# Patient Record
Sex: FEMALE | Race: WHITE | NOT HISPANIC OR LATINO | Employment: OTHER | URBAN - METROPOLITAN AREA
[De-identification: names, ages, dates, MRNs, and addresses within clinical notes are randomized per-mention and may not be internally consistent; named-entity substitution may affect disease eponyms.]

---

## 2017-01-12 ENCOUNTER — HOSPITAL ENCOUNTER (OUTPATIENT)
Dept: ORTHOPEDIC SURGERY | Facility: CLINIC | Age: 62
Discharge: HOME | End: 2017-01-12
Attending: PHYSICIAN ASSISTANT | Admitting: PHYSICIAN ASSISTANT

## 2017-02-07 ENCOUNTER — HOSPITAL ENCOUNTER (OUTPATIENT)
Dept: ORTHOPEDIC SURGERY | Facility: CLINIC | Age: 62
Discharge: HOME | End: 2017-02-07
Attending: PHYSICIAN ASSISTANT | Admitting: PHYSICIAN ASSISTANT

## 2017-02-23 ENCOUNTER — HOSPITAL ENCOUNTER (OUTPATIENT)
Dept: ORTHOPEDIC SURGERY | Facility: CLINIC | Age: 62
Discharge: HOME | End: 2017-02-23
Attending: PHYSICIAN ASSISTANT | Admitting: PHYSICIAN ASSISTANT

## 2017-03-23 ENCOUNTER — HOSPITAL ENCOUNTER (OUTPATIENT)
Dept: ORTHOPEDIC SURGERY | Facility: CLINIC | Age: 62
Discharge: HOME | End: 2017-03-23
Attending: PHYSICIAN ASSISTANT | Admitting: PHYSICIAN ASSISTANT

## 2017-10-25 ENCOUNTER — OFFICE VISIT (OUTPATIENT)
Dept: FAMILY MEDICINE CLINIC | Facility: CLINIC | Age: 62
End: 2017-10-25

## 2017-10-25 VITALS
WEIGHT: 234.1 LBS | OXYGEN SATURATION: 98 % | BODY MASS INDEX: 39 KG/M2 | HEART RATE: 80 BPM | TEMPERATURE: 98.7 F | SYSTOLIC BLOOD PRESSURE: 118 MMHG | DIASTOLIC BLOOD PRESSURE: 75 MMHG | HEIGHT: 65 IN

## 2017-10-25 DIAGNOSIS — R53.83 FATIGUE, UNSPECIFIED TYPE: ICD-10-CM

## 2017-10-25 DIAGNOSIS — G89.29 CHRONIC MIDLINE LOW BACK PAIN WITHOUT SCIATICA: ICD-10-CM

## 2017-10-25 DIAGNOSIS — M54.50 CHRONIC MIDLINE LOW BACK PAIN WITHOUT SCIATICA: ICD-10-CM

## 2017-10-25 DIAGNOSIS — Z13.220 SCREENING FOR HYPERLIPIDEMIA: ICD-10-CM

## 2017-10-25 DIAGNOSIS — M54.2 NECK PAIN: ICD-10-CM

## 2017-10-25 DIAGNOSIS — F32.A DEPRESSION, UNSPECIFIED DEPRESSION TYPE: ICD-10-CM

## 2017-10-25 DIAGNOSIS — Z13.1 SCREENING FOR DIABETES MELLITUS: Primary | ICD-10-CM

## 2017-10-25 DIAGNOSIS — E66.9 CLASS 2 OBESITY WITH BODY MASS INDEX (BMI) OF 39.0 TO 39.9 IN ADULT, UNSPECIFIED OBESITY TYPE, UNSPECIFIED WHETHER SERIOUS COMORBIDITY PRESENT: ICD-10-CM

## 2017-10-25 PROCEDURE — 99214 OFFICE O/P EST MOD 30 MIN: CPT | Performed by: FAMILY MEDICINE

## 2017-10-25 RX ORDER — ESCITALOPRAM OXALATE 10 MG/1
10 TABLET ORAL DAILY
Qty: 30 TABLET | Refills: 2 | Status: SHIPPED | OUTPATIENT
Start: 2017-10-25 | End: 2017-11-29 | Stop reason: ALTCHOICE

## 2017-10-25 RX ORDER — NAPROXEN 500 MG/1
500 TABLET ORAL 2 TIMES DAILY WITH MEALS
Qty: 60 TABLET | Refills: 1 | Status: SHIPPED | OUTPATIENT
Start: 2017-10-25 | End: 2017-11-29 | Stop reason: ALTCHOICE
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
## 2017-10-27 LAB
ALBUMIN SERPL-MCNC: NORMAL G/DL
ALP SERPL-CCNC: NORMAL U/L
ALT SERPL-CCNC: NORMAL U/L
AST SERPL-CCNC: NORMAL U/L
BASOPHILS # BLD AUTO: 0.04 10*3/MM3 (ref 0–0.2)
BASOPHILS NFR BLD AUTO: 0.6 % (ref 0–1.5)
BILIRUB SERPL-MCNC: NORMAL MG/DL
BUN SERPL-MCNC: NORMAL MG/DL
CALCIUM SERPL-MCNC: NORMAL MG/DL
CHLORIDE SERPL-SCNC: NORMAL MMOL/L
CHOLEST SERPL-MCNC: 201 MG/DL (ref 0–200)
CO2 SERPL-SCNC: NORMAL MMOL/L
CREAT SERPL-MCNC: NORMAL MG/DL
EOSINOPHIL # BLD AUTO: 0.14 10*3/MM3 (ref 0–0.7)
EOSINOPHIL NFR BLD AUTO: 2.2 % (ref 0.3–6.2)
ERYTHROCYTE [DISTWIDTH] IN BLOOD BY AUTOMATED COUNT: 14.1 % (ref 11.7–13)
GLUCOSE SERPL-MCNC: NORMAL MG/DL
HCT VFR BLD AUTO: 49.2 % (ref 35.6–45.5)
HCV AB S/CO SERPL IA: <0.1 S/CO RATIO (ref 0–0.9)
HDLC SERPL-MCNC: 49 MG/DL (ref 40–60)
HGB BLD-MCNC: 15.2 G/DL (ref 11.9–15.5)
IMM GRANULOCYTES # BLD: 0 10*3/MM3 (ref 0–0.03)
IMM GRANULOCYTES NFR BLD: 0 % (ref 0–0.5)
LDLC SERPL CALC-MCNC: 120 MG/DL (ref 0–100)
LYMPHOCYTES # BLD AUTO: 1.34 10*3/MM3 (ref 0.9–4.8)
LYMPHOCYTES NFR BLD AUTO: 21.3 % (ref 19.6–45.3)
MCH RBC QN AUTO: 30.2 PG (ref 26.9–32)
MCHC RBC AUTO-ENTMCNC: 30.9 G/DL (ref 32.4–36.3)
MCV RBC AUTO: 97.6 FL (ref 80.5–98.2)
MONOCYTES # BLD AUTO: 0.56 10*3/MM3 (ref 0.2–1.2)
MONOCYTES NFR BLD AUTO: 8.9 % (ref 5–12)
NEUTROPHILS # BLD AUTO: 4.22 10*3/MM3 (ref 1.9–8.1)
NEUTROPHILS NFR BLD AUTO: 67 % (ref 42.7–76)
PLATELET # BLD AUTO: 196 10*3/MM3 (ref 140–500)
POTASSIUM SERPL-SCNC: NORMAL MMOL/L
PROT SERPL-MCNC: NORMAL G/DL
RBC # BLD AUTO: 5.04 10*6/MM3 (ref 3.9–5.2)
REQUEST PROBLEM: NORMAL
SODIUM SERPL-SCNC: NORMAL MMOL/L
TRIGL SERPL-MCNC: 158 MG/DL (ref 0–150)
TSH SERPL DL<=0.005 MIU/L-ACNC: 2.2 MIU/ML (ref 0.27–4.2)
VLDLC SERPL CALC-MCNC: 31.6 MG/DL (ref 5–40)
WBC # BLD AUTO: 6.3 10*3/MM3 (ref 4.5–10.7)

## 2017-10-28 LAB
ALBUMIN SERPL-MCNC: 4.6 G/DL (ref 3.6–4.8)
ALBUMIN/GLOB SERPL: 1.7 {RATIO} (ref 1.2–2.2)
ALP SERPL-CCNC: 91 IU/L (ref 39–117)
ALT SERPL-CCNC: 18 IU/L (ref 0–32)
AST SERPL-CCNC: 21 IU/L (ref 0–40)
BILIRUB SERPL-MCNC: 0.5 MG/DL (ref 0–1.2)
BUN SERPL-MCNC: 17 MG/DL (ref 8–27)
BUN/CREAT SERPL: 22 (ref 12–28)
CALCIUM SERPL-MCNC: 9.3 MG/DL (ref 8.7–10.3)
CHLORIDE SERPL-SCNC: 100 MMOL/L (ref 96–106)
CO2 SERPL-SCNC: 24 MMOL/L (ref 18–29)
CREAT SERPL-MCNC: 0.78 MG/DL (ref 0.57–1)
GFR SERPLBLD CREATININE-BSD FMLA CKD-EPI: 82 ML/MIN/1.73
GFR SERPLBLD CREATININE-BSD FMLA CKD-EPI: 95 ML/MIN/1.73
GLOBULIN SER CALC-MCNC: 2.7 G/DL (ref 1.5–4.5)
GLUCOSE SERPL-MCNC: 62 MG/DL (ref 65–99)
POTASSIUM SERPL-SCNC: 3.9 MMOL/L (ref 3.5–5.2)
PROT SERPL-MCNC: 7.3 G/DL (ref 6–8.5)
SODIUM SERPL-SCNC: 142 MMOL/L (ref 134–144)

## 2017-10-30 ENCOUNTER — TELEPHONE (OUTPATIENT)
Dept: FAMILY MEDICINE CLINIC | Facility: CLINIC | Age: 62
End: 2017-10-30

## 2017-10-30 NOTE — TELEPHONE ENCOUNTER
Spoke with patient regarding her question about Lexapro causing insomnia and tenseness.  She says this has improved over the weekend and she has been sleeping better and the tenseness she is experiencing in the jaw and neck has also been improving at she still notices it somewhat.  She notices more of a difference in her ability to relax her body not necessarily any mood changes.  She is happy with these changes.    We discussed her lab results including normal TSH, negative hepatitis C antibody, normal CMP outside of only slightly low blood sugar level, and her lipid panel with mild elevation in total cholesterol, LDL, and triglycerides.  We discussed that these numbers don't indicate starting medication at this time giving her mild elevation, and as we are on the journey to having her hopefully feel better and improve her level of function she will become more physically active and these numbers will improve.  For the only mild degree of hypoglycemia, we will repeat her blood glucose when she returns back to the office at the end of November.    She starts physical therapy on November 8.  All of her questions were answered and it was discussed that if anything changes she can contact the office or send me another message through her chart.

## 2017-11-08 ENCOUNTER — TREATMENT (OUTPATIENT)
Dept: PHYSICAL THERAPY | Facility: CLINIC | Age: 62
End: 2017-11-08

## 2017-11-08 DIAGNOSIS — M25.571 CHRONIC PAIN OF RIGHT ANKLE: ICD-10-CM

## 2017-11-08 DIAGNOSIS — G89.29 CHRONIC BILATERAL LOW BACK PAIN WITH BILATERAL SCIATICA: Primary | ICD-10-CM

## 2017-11-08 DIAGNOSIS — M54.41 CHRONIC BILATERAL LOW BACK PAIN WITH BILATERAL SCIATICA: Primary | ICD-10-CM

## 2017-11-08 DIAGNOSIS — M54.12 RADICULOPATHY, CERVICAL: ICD-10-CM

## 2017-11-08 DIAGNOSIS — M54.42 CHRONIC BILATERAL LOW BACK PAIN WITH BILATERAL SCIATICA: Primary | ICD-10-CM

## 2017-11-08 DIAGNOSIS — M54.2 PAIN, NECK: ICD-10-CM

## 2017-11-08 DIAGNOSIS — G89.29 CHRONIC PAIN OF RIGHT ANKLE: ICD-10-CM

## 2017-11-08 PROCEDURE — 97163 PT EVAL HIGH COMPLEX 45 MIN: CPT | Performed by: PHYSICAL THERAPIST

## 2017-11-08 PROCEDURE — 97110 THERAPEUTIC EXERCISES: CPT | Performed by: PHYSICAL THERAPIST
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
## 2017-11-15 ENCOUNTER — TREATMENT (OUTPATIENT)
Dept: PHYSICAL THERAPY | Facility: CLINIC | Age: 62
End: 2017-11-15

## 2017-11-15 DIAGNOSIS — M54.2 PAIN, NECK: ICD-10-CM

## 2017-11-15 DIAGNOSIS — M54.41 CHRONIC BILATERAL LOW BACK PAIN WITH BILATERAL SCIATICA: Primary | ICD-10-CM

## 2017-11-15 DIAGNOSIS — M54.12 RADICULOPATHY, CERVICAL: ICD-10-CM

## 2017-11-15 DIAGNOSIS — G89.29 CHRONIC BILATERAL LOW BACK PAIN WITH BILATERAL SCIATICA: Primary | ICD-10-CM

## 2017-11-15 DIAGNOSIS — G89.29 CHRONIC PAIN OF RIGHT ANKLE: ICD-10-CM

## 2017-11-15 DIAGNOSIS — M25.571 CHRONIC PAIN OF RIGHT ANKLE: ICD-10-CM

## 2017-11-15 DIAGNOSIS — M54.42 CHRONIC BILATERAL LOW BACK PAIN WITH BILATERAL SCIATICA: Primary | ICD-10-CM

## 2017-11-15 PROCEDURE — 97110 THERAPEUTIC EXERCISES: CPT | Performed by: PHYSICAL THERAPIST

## 2017-11-15 PROCEDURE — 97140 MANUAL THERAPY 1/> REGIONS: CPT | Performed by: PHYSICAL THERAPIST

## 2017-11-15 NOTE — PROGRESS NOTES
Physical Therapy Daily Progress Note        Patient: Jannet Fierro   : 1955  Diagnosis/ICD-10 Code:  Chronic bilateral low back pain with bilateral sciatica [M54.42, M54.41, G89.29]  Referring practitioner: Sandra Roe MD  Date of Initial Visit: Type: THERAPY  Noted: 2017  Today's Date: 11/15/2017  Patient seen for 2 sessions         Jannet Fierro reports:       Subjective   Patient reports she has had increased pain/soreness with the new exercises and feels her pain is a little worse than typical because of the weather.    Objective   See Exercise, Manual, and Modality Logs for complete treatment.       Assessment/Plan  Patient tolerated exercise progression well with some tenderness but nothing more than her usual pain symptoms with activity.  Noted increased right ankle ROM since initial eval.  Patient declined modalities for pain management.  Progress per Plan of Care and Progress strengthening /stabilization /functional activity           Manual Therapy:    10     mins  06177;  Therapeutic Exercise:    35     mins  54839;     Neuromuscular Candice:        mins  57756;    Therapeutic Activity:          mins  40363;     Gait Training:           mins  52394;     Ultrasound:          mins  33388;    Electrical Stimulation:         mins  31561 ( );  Dry Needling          mins self-pay    Timed Treatment:   45   mins   Total Treatment:     50   mins    Malou Mederos PT  Physical Therapist

## 2017-11-17 ENCOUNTER — TREATMENT (OUTPATIENT)
Dept: PHYSICAL THERAPY | Facility: CLINIC | Age: 62
End: 2017-11-17

## 2017-11-17 DIAGNOSIS — M54.42 CHRONIC BILATERAL LOW BACK PAIN WITH BILATERAL SCIATICA: Primary | ICD-10-CM

## 2017-11-17 DIAGNOSIS — M54.2 PAIN, NECK: ICD-10-CM

## 2017-11-17 DIAGNOSIS — G89.29 CHRONIC PAIN OF RIGHT ANKLE: ICD-10-CM

## 2017-11-17 DIAGNOSIS — G89.29 CHRONIC BILATERAL LOW BACK PAIN WITH BILATERAL SCIATICA: Primary | ICD-10-CM

## 2017-11-17 DIAGNOSIS — M54.41 CHRONIC BILATERAL LOW BACK PAIN WITH BILATERAL SCIATICA: Primary | ICD-10-CM

## 2017-11-17 DIAGNOSIS — M54.12 RADICULOPATHY, CERVICAL: ICD-10-CM

## 2017-11-17 DIAGNOSIS — M25.571 CHRONIC PAIN OF RIGHT ANKLE: ICD-10-CM

## 2017-11-17 PROCEDURE — 97110 THERAPEUTIC EXERCISES: CPT | Performed by: PHYSICAL THERAPIST

## 2017-11-17 PROCEDURE — 97140 MANUAL THERAPY 1/> REGIONS: CPT | Performed by: PHYSICAL THERAPIST

## 2017-11-17 NOTE — PROGRESS NOTES
Physical Therapy Daily Progress Note        Patient: Jannet Fierro   : 1955  Diagnosis/ICD-10 Code:  Chronic bilateral low back pain with bilateral sciatica [M54.42, M54.41, G89.29]  Referring practitioner: Sandra Roe MD  Date of Initial Visit: Type: THERAPY  Noted: 2017  Today's Date: 2017  Patient seen for 3 sessions         Jannet Fierro reports:       Subjective   Patient reports her neck is very stiff today, her ankle is very painful and she can feel the hardware.    Objective   Right AINN-corrected with MET    See Exercise, Manual, and Modality Logs for complete treatment.       Assessment/Plan  Patient tolerated treatment well without complaints.  Improved SIJ alignment following MET.  Improved pain symptoms following treatment.  Progress per Plan of Care and Progress strengthening /stabilization /functional activity           Manual Therapy:    15     mins  90821;  Therapeutic Exercise:    30     mins  30654;     Neuromuscular Candice:        mins  12448;    Therapeutic Activity:          mins  28617;     Gait Training:           mins  98616;     Ultrasound:          mins  72901;    Electrical Stimulation:         mins  14805 ( );  Dry Needling          mins self-pay    Timed Treatment:   45   mins   Total Treatment:     50   mins    Malou Mederos PT  Physical Therapist

## 2017-11-21 ENCOUNTER — TREATMENT (OUTPATIENT)
Dept: PHYSICAL THERAPY | Facility: CLINIC | Age: 62
End: 2017-11-21

## 2017-11-21 DIAGNOSIS — M54.2 PAIN, NECK: ICD-10-CM

## 2017-11-21 DIAGNOSIS — G89.29 CHRONIC PAIN OF RIGHT ANKLE: ICD-10-CM

## 2017-11-21 DIAGNOSIS — G89.29 CHRONIC BILATERAL LOW BACK PAIN WITH BILATERAL SCIATICA: Primary | ICD-10-CM

## 2017-11-21 DIAGNOSIS — M54.42 CHRONIC BILATERAL LOW BACK PAIN WITH BILATERAL SCIATICA: Primary | ICD-10-CM

## 2017-11-21 DIAGNOSIS — M54.12 RADICULOPATHY, CERVICAL: ICD-10-CM

## 2017-11-21 DIAGNOSIS — M25.571 CHRONIC PAIN OF RIGHT ANKLE: ICD-10-CM

## 2017-11-21 DIAGNOSIS — M54.41 CHRONIC BILATERAL LOW BACK PAIN WITH BILATERAL SCIATICA: Primary | ICD-10-CM

## 2017-11-21 PROCEDURE — 97140 MANUAL THERAPY 1/> REGIONS: CPT | Performed by: PHYSICAL THERAPIST

## 2017-11-21 PROCEDURE — 97110 THERAPEUTIC EXERCISES: CPT | Performed by: PHYSICAL THERAPIST

## 2017-11-21 NOTE — PROGRESS NOTES
Physical Therapy Daily Progress Note        Patient: Jannet Fierro   : 1955  Diagnosis/ICD-10 Code:  Chronic bilateral low back pain with bilateral sciatica [M54.42, M54.41, G89.29]  Referring practitioner: Sandra Roe MD  Date of Initial Visit: Type: THERAPY  Noted: 2017  Today's Date: 2017  Patient seen for 4 sessions         Jannet Fierro reports:     Subjective   Patient reports her pain has decreased some, her right ankle is a little more swollen than usual.  Reports she was able to walk longer than usual when walking her dog and with less pain.        Objective   See Exercise, Manual, and Modality Logs for complete treatment.       Assessment/Plan  Improving right ankle ROM and strength, continues with limited lumbar and cervical mobility.    Progress per Plan of Care and Progress strengthening /stabilization /functional activity           Manual Therapy:    15     mins  96067;  Therapeutic Exercise:    30     mins  41917;     Neuromuscular Candice:        mins  52842;    Therapeutic Activity:          mins  90481;     Gait Training:           mins  38692;     Ultrasound:          mins  03923;    Electrical Stimulation:         mins  51254 ( );  Dry Needling          mins self-pay    Timed Treatment:   45   mins   Total Treatment:     50   mins    Malou Mederos, PT  Physical Therapist

## 2017-11-27 ENCOUNTER — TREATMENT (OUTPATIENT)
Dept: PHYSICAL THERAPY | Facility: CLINIC | Age: 62
End: 2017-11-27

## 2017-11-27 DIAGNOSIS — M25.571 CHRONIC PAIN OF RIGHT ANKLE: ICD-10-CM

## 2017-11-27 DIAGNOSIS — M54.41 CHRONIC BILATERAL LOW BACK PAIN WITH BILATERAL SCIATICA: Primary | ICD-10-CM

## 2017-11-27 DIAGNOSIS — G89.29 CHRONIC PAIN OF RIGHT ANKLE: ICD-10-CM

## 2017-11-27 DIAGNOSIS — M54.42 CHRONIC BILATERAL LOW BACK PAIN WITH BILATERAL SCIATICA: Primary | ICD-10-CM

## 2017-11-27 DIAGNOSIS — G89.29 CHRONIC BILATERAL LOW BACK PAIN WITH BILATERAL SCIATICA: Primary | ICD-10-CM

## 2017-11-27 DIAGNOSIS — M54.12 RADICULOPATHY, CERVICAL: ICD-10-CM

## 2017-11-27 DIAGNOSIS — M54.2 PAIN, NECK: ICD-10-CM

## 2017-11-27 PROCEDURE — 97110 THERAPEUTIC EXERCISES: CPT | Performed by: PHYSICAL THERAPIST

## 2017-11-27 PROCEDURE — 97140 MANUAL THERAPY 1/> REGIONS: CPT | Performed by: PHYSICAL THERAPIST

## 2017-11-27 NOTE — PROGRESS NOTES
Physical Therapy Daily Progress Note        Patient: Jannet Fierro   : 1955  Diagnosis/ICD-10 Code:  Chronic bilateral low back pain with bilateral sciatica [M54.42, M54.41, G89.29]  Referring practitioner: Sandra Roe MD  Date of Initial Visit: Type: THERAPY  Noted: 2017  Today's Date: 2017  Patient seen for 5 sessions         Jannet Fierro reports:       Subjective   Patient reports she has been having overall less intense pain although pain symptoms. Reports cooling and cleaning on Thursday did increase her pain somewhat.    Objective   See Exercise, Manual, and Modality Logs for complete treatment.       Assessment/Plan  Patient perform program and tolerated progression without complaints.  Progress per Plan of Care           Manual Therapy:    15     mins  48269;  Therapeutic Exercise:    30     mins  32386;     Neuromuscular Candice:        mins  61685;    Therapeutic Activity:          mins  65103;     Gait Training:           mins  64983;     Ultrasound:          mins  87494;    Electrical Stimulation:         mins  25656 ( );  Dry Needling          mins self-pay    Timed Treatment:   45   mins   Total Treatment:     50   mins    Malou Mederos PT  Physical Therapist

## 2017-11-29 ENCOUNTER — TREATMENT (OUTPATIENT)
Dept: PHYSICAL THERAPY | Facility: CLINIC | Age: 62
End: 2017-11-29

## 2017-11-29 ENCOUNTER — OFFICE VISIT (OUTPATIENT)
Dept: FAMILY MEDICINE CLINIC | Facility: CLINIC | Age: 62
End: 2017-11-29

## 2017-11-29 VITALS
HEART RATE: 74 BPM | DIASTOLIC BLOOD PRESSURE: 80 MMHG | BODY MASS INDEX: 40.39 KG/M2 | OXYGEN SATURATION: 96 % | TEMPERATURE: 98.1 F | HEIGHT: 65 IN | SYSTOLIC BLOOD PRESSURE: 130 MMHG | WEIGHT: 242.4 LBS

## 2017-11-29 DIAGNOSIS — M54.42 CHRONIC BILATERAL LOW BACK PAIN WITH BILATERAL SCIATICA: Primary | ICD-10-CM

## 2017-11-29 DIAGNOSIS — G89.29 CHRONIC PAIN OF RIGHT ANKLE: ICD-10-CM

## 2017-11-29 DIAGNOSIS — M54.41 CHRONIC BILATERAL LOW BACK PAIN WITH BILATERAL SCIATICA: Primary | ICD-10-CM

## 2017-11-29 DIAGNOSIS — G89.29 CHRONIC MIDLINE LOW BACK PAIN WITHOUT SCIATICA: Primary | ICD-10-CM

## 2017-11-29 DIAGNOSIS — M54.2 PAIN, NECK: ICD-10-CM

## 2017-11-29 DIAGNOSIS — F32.0 MILD SINGLE CURRENT EPISODE OF MAJOR DEPRESSIVE DISORDER (HCC): ICD-10-CM

## 2017-11-29 DIAGNOSIS — G89.29 CHRONIC BILATERAL LOW BACK PAIN WITH BILATERAL SCIATICA: Primary | ICD-10-CM

## 2017-11-29 DIAGNOSIS — M54.50 CHRONIC MIDLINE LOW BACK PAIN WITHOUT SCIATICA: Primary | ICD-10-CM

## 2017-11-29 DIAGNOSIS — M25.571 CHRONIC PAIN OF RIGHT ANKLE: ICD-10-CM

## 2017-11-29 DIAGNOSIS — M54.12 RADICULOPATHY, CERVICAL: ICD-10-CM

## 2017-11-29 DIAGNOSIS — R63.5 WEIGHT GAIN: ICD-10-CM

## 2017-11-29 PROCEDURE — 97140 MANUAL THERAPY 1/> REGIONS: CPT | Performed by: PHYSICAL THERAPIST

## 2017-11-29 PROCEDURE — 99213 OFFICE O/P EST LOW 20 MIN: CPT | Performed by: FAMILY MEDICINE

## 2017-11-29 PROCEDURE — 97110 THERAPEUTIC EXERCISES: CPT | Performed by: PHYSICAL THERAPIST

## 2017-11-29 RX ORDER — CITALOPRAM 20 MG/1
20 TABLET ORAL DAILY
Qty: 30 TABLET | Refills: 2 | Status: SHIPPED | OUTPATIENT
Start: 2017-11-29 | End: 2018-01-30 | Stop reason: SDUPTHER

## 2017-11-29 NOTE — PROGRESS NOTES
Physical Therapy Daily Progress Note        Patient: Jannet Fierro   : 1955  Diagnosis/ICD-10 Code:  Chronic bilateral low back pain with bilateral sciatica [M54.42, M54.41, G89.29]  Referring practitioner: Sandra Roe MD  Date of Initial Visit: Type: THERAPY  Noted: 2017  Today's Date: 2017  Patient seen for 6 sessions         Jannet Fierro reports:     Subjective   Patient reports she used the stairs at work today and has had increased achilles pain in bilateral ankles since.  Other than the achilles pain she has been feeling better.      Objective   See Exercise, Manual, and Modality Logs for complete treatment.       Assessment/Plan  Patient tolerated treatment well without increased symptoms.  Patient in agreement to continue PT to advance exercise program.  Progress per Plan of Care and Progress strengthening /stabilization /functional activity           Manual Therapy:    10     mins  14248;  Therapeutic Exercise:    30     mins  69195;     Neuromuscular Candice:        mins  11392;    Therapeutic Activity:          mins  67545;     Gait Training:           mins  01499;     Ultrasound:          mins  87231;    Electrical Stimulation:         mins  35281 ( );  Dry Needling          mins self-pay    Timed Treatment:   40   mins   Total Treatment:     45   mins    Malou Mederos PT  Physical Therapist
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
## 2017-12-05 ENCOUNTER — TREATMENT (OUTPATIENT)
Dept: PHYSICAL THERAPY | Facility: CLINIC | Age: 62
End: 2017-12-05

## 2017-12-05 DIAGNOSIS — M25.571 CHRONIC PAIN OF RIGHT ANKLE: ICD-10-CM

## 2017-12-05 DIAGNOSIS — M54.41 CHRONIC BILATERAL LOW BACK PAIN WITH BILATERAL SCIATICA: Primary | ICD-10-CM

## 2017-12-05 DIAGNOSIS — M54.42 CHRONIC BILATERAL LOW BACK PAIN WITH BILATERAL SCIATICA: Primary | ICD-10-CM

## 2017-12-05 DIAGNOSIS — M54.2 PAIN, NECK: ICD-10-CM

## 2017-12-05 DIAGNOSIS — G89.29 CHRONIC PAIN OF RIGHT ANKLE: ICD-10-CM

## 2017-12-05 DIAGNOSIS — G89.29 CHRONIC BILATERAL LOW BACK PAIN WITH BILATERAL SCIATICA: Primary | ICD-10-CM

## 2017-12-05 DIAGNOSIS — M54.12 RADICULOPATHY, CERVICAL: ICD-10-CM

## 2017-12-05 PROCEDURE — 97110 THERAPEUTIC EXERCISES: CPT | Performed by: PHYSICAL THERAPIST

## 2017-12-05 PROCEDURE — 97140 MANUAL THERAPY 1/> REGIONS: CPT | Performed by: PHYSICAL THERAPIST

## 2017-12-05 NOTE — PROGRESS NOTES
Physical Therapy Daily Progress Note        Patient: Jannet Fierro   : 1955  Diagnosis/ICD-10 Code:  Chronic bilateral low back pain with bilateral sciatica [M54.42, M54.41, G89.29]  Referring practitioner: Sandra Roe MD  Date of Initial Visit: Type: THERAPY  Noted: 2017  Today's Date: 2017  Patient seen for 7 sessions         Jannet Fierro reports:     Subjective   Patient reports her ankle is feeling better with more mobility although pain and calf muscle tightness continues.  Reports the new stretches for the shoulder/neck jazzed her pain a little but feels she needs to keep doing them.  States the change in weather has her pain level elevated a little today.    Objective   See Exercise, Manual, and Modality Logs for complete treatment.       Assessment/Plan  Patient tolerated treatment and exercise progression well without adverse symptoms.  Improved right ankle ROM and TCJ mobility.    Progress per Plan of Care and Progress strengthening /stabilization /functional activity           Manual Therapy:    10     mins  81180;  Therapeutic Exercise:    30     mins  59169;     Neuromuscular Candice:        mins  94061;    Therapeutic Activity:          mins  69390;     Gait Training:           mins  87779;     Ultrasound:          mins  84705;    Electrical Stimulation:         mins  37139 ( );  Dry Needling          mins self-pay    Timed Treatment:   40   mins   Total Treatment:     45   mins    Malou Mederos PT  Physical Therapist

## 2017-12-07 DIAGNOSIS — M62.838 MUSCLE SPASM: Primary | ICD-10-CM

## 2017-12-07 RX ORDER — METHOCARBAMOL 500 MG/1
750 TABLET, FILM COATED ORAL 4 TIMES DAILY
Qty: 135 TABLET | Refills: 0 | Status: SHIPPED | OUTPATIENT
Start: 2017-12-07 | End: 2018-01-30 | Stop reason: SDUPTHER

## 2017-12-12 ENCOUNTER — TREATMENT (OUTPATIENT)
Dept: PHYSICAL THERAPY | Facility: CLINIC | Age: 62
End: 2017-12-12

## 2017-12-12 DIAGNOSIS — M54.12 RADICULOPATHY, CERVICAL: ICD-10-CM

## 2017-12-12 DIAGNOSIS — M54.41 CHRONIC BILATERAL LOW BACK PAIN WITH BILATERAL SCIATICA: Primary | ICD-10-CM

## 2017-12-12 DIAGNOSIS — M25.571 CHRONIC PAIN OF RIGHT ANKLE: ICD-10-CM

## 2017-12-12 DIAGNOSIS — G89.29 CHRONIC BILATERAL LOW BACK PAIN WITH BILATERAL SCIATICA: Primary | ICD-10-CM

## 2017-12-12 DIAGNOSIS — G89.29 CHRONIC PAIN OF RIGHT ANKLE: ICD-10-CM

## 2017-12-12 DIAGNOSIS — M54.2 PAIN, NECK: ICD-10-CM

## 2017-12-12 DIAGNOSIS — M54.42 CHRONIC BILATERAL LOW BACK PAIN WITH BILATERAL SCIATICA: Primary | ICD-10-CM

## 2017-12-12 PROCEDURE — 97140 MANUAL THERAPY 1/> REGIONS: CPT | Performed by: PHYSICAL THERAPIST

## 2017-12-12 PROCEDURE — 97110 THERAPEUTIC EXERCISES: CPT | Performed by: PHYSICAL THERAPIST

## 2017-12-12 NOTE — PROGRESS NOTES
Re-Assessment / Re-Certification      Patient: Jannet Fierro   : 1955  Diagnosis/ICD-10 Code:  Chronic bilateral low back pain with bilateral sciatica [M54.42, M54.41, G89.29]  Referring practitioner: Sandra Roe MD  Date of Initial Visit: Type: THERAPY  Noted: 2017  Today's Date: 2017  Patient seen for 8 sessions      Subjective:   Jannet Fierro reports:   Subjective Questionnaire: NDI:20  Oswestry: 25/50  Clinical Progress: improved  Home Program Compliance: Yes  Treatment has included: therapeutic exercise, manual therapy and moist heat    Subjective   Objective       Active Range of Motion     Right Ankle/Foot   Dorsiflexion (ke): 4 degrees   Plantar flexion: 60 degrees   Inversion: 28 degrees   Eversion: 6 degrees      Assessment/Plan   Pt would benefit from continued skilled PT services in order to address listed impairments and increase tolerance to normal daily activities including ADLs, work and recreational activities  Progress toward previous goals: Partially Met    Plan Goals: Short Term Goals (2 wks):  1.  Patient will have increased cervical and lumbar spine ROM to be symmetrical and WFL's to allow for increased functional joint mobility.  2.  Patient will have increased cervical spine strength to 5/5 to allow for increased spinal stabilization and support.  3.  Patient will have decreased pain to 3/10 to allow for increased comfort with functional activities and allow for improved restorative sleep.  4.  Patient will have increased right ankle ROM to WNL's to allow for improved joint mechanics and decreased abnormal gait mechanics.  5.  Patient will have increased right ankle joint mobility to WNL's to restore normal joint mechanics and decrease abnormal gait mechanics and CKC dysfunction.    Long Term Goals (3-4 wks):  1.  Patient will have increased right ankle strength to 5/5.  2.  Patient will be independent in performance of HEP for carryover upon discharge from skilled PT  services.  3.  Patient will have an improved NDI score of 8 or less to allow improved functional activity tolerance.  4.  Patient will report return to performance of ADLs/Work//Leisure activities with minimal/baseline symptoms/pain limitations.        Recommendations: Continue as planned  Timeframe: 3 weeks  Prognosis to achieve goals: good    PT Signature: Malou Mederos, PT      Based upon review of the patient's progress and continued therapy plan, it is my medical opinion that Jannet Fierro should continue physical therapy treatment at [facility]  [address] [place] [id number] [phone].    Signature: __________________________________  Sandra Roe MD    Manual Therapy:    8     mins  45867;  Therapeutic Exercise:    20     mins  69558;     Neuromuscular Candice:        mins  57618;    Therapeutic Activity:          mins  65611;     Gait Training:           mins  18183;     Ultrasound:          mins  80310;    Electrical Stimulation:         mins  33327 ( );  Dry Needling          mins self-pay    Timed Treatment:   28   mins   Total Treatment:     40   mins

## 2017-12-18 ENCOUNTER — TREATMENT (OUTPATIENT)
Dept: PHYSICAL THERAPY | Facility: CLINIC | Age: 62
End: 2017-12-18

## 2017-12-18 DIAGNOSIS — G89.29 CHRONIC BILATERAL LOW BACK PAIN WITH BILATERAL SCIATICA: Primary | ICD-10-CM

## 2017-12-18 DIAGNOSIS — G89.29 CHRONIC PAIN OF RIGHT ANKLE: ICD-10-CM

## 2017-12-18 DIAGNOSIS — M25.571 CHRONIC PAIN OF RIGHT ANKLE: ICD-10-CM

## 2017-12-18 DIAGNOSIS — M54.41 CHRONIC BILATERAL LOW BACK PAIN WITH BILATERAL SCIATICA: Primary | ICD-10-CM

## 2017-12-18 DIAGNOSIS — M54.42 CHRONIC BILATERAL LOW BACK PAIN WITH BILATERAL SCIATICA: Primary | ICD-10-CM

## 2017-12-18 DIAGNOSIS — M54.12 RADICULOPATHY, CERVICAL: ICD-10-CM

## 2017-12-18 DIAGNOSIS — M54.2 PAIN, NECK: ICD-10-CM

## 2017-12-18 PROCEDURE — 97110 THERAPEUTIC EXERCISES: CPT | Performed by: PHYSICAL THERAPIST

## 2017-12-19 NOTE — PROGRESS NOTES
Physical Therapy Daily Progress Note        Patient: Jannet Fierro   : 1955  Diagnosis/ICD-10 Code:  Chronic bilateral low back pain with bilateral sciatica [M54.42, M54.41, G89.29]  Referring practitioner: Sandra Roe MD  Date of Initial Visit: Type: THERAPY  Noted: 2017  Today's Date: 2017 Documentation completed for treatment completed on 17  Patient seen for 9 sessions         Jannet Fierro reports:       Subjective   Patient reports her pain has been about her same baseline.  States she is just not getting relief from her current medications and feels she is going to have to take something stronger.    Objective   See Exercise, Manual, and Modality Logs for complete treatment.       Assessment/Plan  Patient tolerated exercise progression fairly well with some aggravation of pain.  Patient encouraged to only stretch to tolerance and not be too aggressive with stretching until she knew how her body was going to respond to prevent a severe pain flare.  Progress per Plan of Care           Manual Therapy:         mins  63704;  Therapeutic Exercise:    25     mins  23957;     Neuromuscular Candice:        mins  81929;    Therapeutic Activity:          mins  40423;     Gait Training:           mins  41164;     Ultrasound:          mins  94860;    Electrical Stimulation:         mins  54035 ( );  Dry Needling          mins self-pay    Timed Treatment:   25   mins   Total Treatment:     30   mins    Malou Mederos PT  Physical Therapist

## 2018-01-04 ENCOUNTER — TREATMENT (OUTPATIENT)
Dept: PHYSICAL THERAPY | Facility: CLINIC | Age: 63
End: 2018-01-04

## 2018-01-04 DIAGNOSIS — M54.2 PAIN, NECK: ICD-10-CM

## 2018-01-04 DIAGNOSIS — G89.29 CHRONIC BILATERAL LOW BACK PAIN WITH BILATERAL SCIATICA: Primary | ICD-10-CM

## 2018-01-04 DIAGNOSIS — M54.41 CHRONIC BILATERAL LOW BACK PAIN WITH BILATERAL SCIATICA: Primary | ICD-10-CM

## 2018-01-04 DIAGNOSIS — M54.12 RADICULOPATHY, CERVICAL: ICD-10-CM

## 2018-01-04 DIAGNOSIS — M54.42 CHRONIC BILATERAL LOW BACK PAIN WITH BILATERAL SCIATICA: Primary | ICD-10-CM

## 2018-01-04 PROCEDURE — 97140 MANUAL THERAPY 1/> REGIONS: CPT | Performed by: PHYSICAL THERAPIST

## 2018-01-04 NOTE — PROGRESS NOTES
Physical Therapy Daily Progress Note        Patient: Jannet Fierro   : 1955  Diagnosis/ICD-10 Code:  Chronic bilateral low back pain with bilateral sciatica [M54.42, M54.41, G89.29]  Referring practitioner: Sandra Roe MD  Date of Initial Visit: Type: THERAPY  Noted: 2017  Today's Date: 2018  Patient seen for 10 sessions         Jannet Fierro reports:     Subjective   Patient reports she continues to have pain.  States she feels her ankle is as good as it is going to get.  Reports her neck is still a little stirred up from the new exercises to target the neck.    Objective   See Exercise, Manual, and Modality Logs for complete treatment.       Assessment/Plan  Patient tolerated treatment well with decreased pain in the left neck region.    Progress per Plan of Care           Manual Therapy:    25     mins  52099;  Therapeutic Exercise:         mins  42882;     Neuromuscular Candice:        mins  72790;    Therapeutic Activity:          mins  16292;     Gait Training:           mins  94312;     Ultrasound:          mins  17164;    Electrical Stimulation:         mins  39833 ( );  Dry Needling          mins self-pay    Timed Treatment:   25   mins   Total Treatment:     35   mins    Malou Mederos PT  Physical Therapist

## 2018-01-09 ENCOUNTER — TREATMENT (OUTPATIENT)
Dept: PHYSICAL THERAPY | Facility: CLINIC | Age: 63
End: 2018-01-09

## 2018-01-09 DIAGNOSIS — M54.42 CHRONIC BILATERAL LOW BACK PAIN WITH BILATERAL SCIATICA: Primary | ICD-10-CM

## 2018-01-09 DIAGNOSIS — M54.12 RADICULOPATHY, CERVICAL: ICD-10-CM

## 2018-01-09 DIAGNOSIS — M54.2 PAIN, NECK: ICD-10-CM

## 2018-01-09 DIAGNOSIS — G89.29 CHRONIC BILATERAL LOW BACK PAIN WITH BILATERAL SCIATICA: Primary | ICD-10-CM

## 2018-01-09 DIAGNOSIS — M54.41 CHRONIC BILATERAL LOW BACK PAIN WITH BILATERAL SCIATICA: Primary | ICD-10-CM

## 2018-01-09 PROCEDURE — 97140 MANUAL THERAPY 1/> REGIONS: CPT | Performed by: PHYSICAL THERAPIST

## 2018-01-10 NOTE — PROGRESS NOTES
Physical Therapy Daily Progress Note        Patient: Jannet Fierro   : 1955  Diagnosis/ICD-10 Code:  Chronic bilateral low back pain with bilateral sciatica [M54.42, M54.41, G89.29]  Referring practitioner: Sandra Roe MD  Date of Initial Visit: Type: THERAPY  Noted: 2017  Today's Date: 2018  Patient seen for 11 sessions         Jannet Fierro reports:       Subjective   Patient reports her neck did not get worse after last treatment.  States she has a little less pain on the left side.    Objective   See Exercise, Manual, and Modality Logs for complete treatment.       Assessment/Plan  Patient tolerated treatment well without adverse symptoms.  Improved bilateral UT muscle tightness and decreased palpable trigger point post treatment.  Progress per Plan of Care           Manual Therapy:    20     mins  76553;  Therapeutic Exercise:         mins  71645;     Neuromuscular Candice:        mins  87474;    Therapeutic Activity:          mins  70400;     Gait Training:           mins  46827;     Ultrasound:          mins  14669;    Electrical Stimulation:         mins  19895 ( );  Dry Needling          mins self-pay    Timed Treatment:   20   mins   Total Treatment:     35   mins    Malou Mederos PT  Physical Therapist

## 2018-01-17 ENCOUNTER — TREATMENT (OUTPATIENT)
Dept: PHYSICAL THERAPY | Facility: CLINIC | Age: 63
End: 2018-01-17

## 2018-01-17 DIAGNOSIS — M54.2 PAIN, NECK: Primary | ICD-10-CM

## 2018-01-17 DIAGNOSIS — M54.12 RADICULOPATHY, CERVICAL: ICD-10-CM

## 2018-01-17 PROCEDURE — DRYNDL PR CUSTOM DRY NEEDLING SELF PAY: Performed by: PHYSICAL THERAPIST

## 2018-01-17 PROCEDURE — 97140 MANUAL THERAPY 1/> REGIONS: CPT | Performed by: PHYSICAL THERAPIST

## 2018-01-17 NOTE — PROGRESS NOTES
Physical Therapy Daily Progress Note        Patient: Jannet Fierro   : 1955  Diagnosis/ICD-10 Code:  Pain, neck [M54.2]  Referring practitioner: Sandra Roe MD  Date of Initial Visit: Type: THERAPY  Noted: 2017  Today's Date: 2018  Patient seen for 12 sessions         Jannet Fierro reports:     Subjective   Patient reports continues pain and tightness in neck.  States she didn't have increased symptoms after last session. Received dry needling treatment today.    Objective   See Exercise, Manual, and Modality Logs for complete treatment.       Assessment/Plan  Patient tolerated treatment well with decreased TTP in the cervical and upper thoracic regions.  Progress per Plan of Care           Manual Therapy:    25     mins  01079;  Therapeutic Exercise:         mins  35245;     Neuromuscular Candice:        mins  33741;    Therapeutic Activity:          mins  09481;     Gait Training:           mins  13287;     Ultrasound:          mins  17685;    Electrical Stimulation:         mins  12842 ( );  Dry Needling          mins self-pay    Timed Treatment:   25   mins   Total Treatment:     40   mins    Malou Mederos PT  Physical Therapist

## 2018-01-18 NOTE — PROGRESS NOTES
Physical Therapy Daily Progress Note  12 treatments  Subjective     Jannet Fierro reports: Patient reports that she is here for dry needling.         Objective   See Exercise, Manual, and Modality Logs for complete treatment.     Soft tissue was assessed at (B) upper traps. PT noted point tenderness as well as palpable trigger points within the muslce tissue. On this date patient stated that they would like to undergo a dry needling procedure for the soft tissue dysfunction. Patient was educated on the procedure for dry needling and consent waver was signed. Patient was informed of the risks, possible adverse effects, along with the benefits of TDN.     Assessment/Plan     Patient tolerated all treatment well. LTR was seen in all targeted muscles.     Progress per Plan of Care           Manual Therapy:         mins  55454;  Therapeutic Exercise:         mins  83083;     Neuromuscular Candice:        mins  82680;    Therapeutic Activity:          mins  20297;     Gait Training:           mins  74563;     Ultrasound:          mins  84303;    Electrical Stimulation:         mins  44973 ( );  Dry Needling    15      mins self-pay    Timed Treatment:      mins   Total Treatment:     15   mins    Sergio Nair PT  Physical Therapist  [place] License # [id number]

## 2018-01-19 ENCOUNTER — TREATMENT (OUTPATIENT)
Dept: PHYSICAL THERAPY | Facility: CLINIC | Age: 63
End: 2018-01-19

## 2018-01-19 DIAGNOSIS — M54.12 RADICULOPATHY, CERVICAL: ICD-10-CM

## 2018-01-19 DIAGNOSIS — M54.2 PAIN, NECK: Primary | ICD-10-CM

## 2018-01-19 PROCEDURE — 97035 APP MDLTY 1+ULTRASOUND EA 15: CPT | Performed by: PHYSICAL THERAPIST

## 2018-01-19 PROCEDURE — 97140 MANUAL THERAPY 1/> REGIONS: CPT | Performed by: PHYSICAL THERAPIST

## 2018-01-19 NOTE — PROGRESS NOTES
Physical Therapy Daily Progress Note        Patient: Jannet Fierro   : 1955  Diagnosis/ICD-10 Code:  Pain, neck [M54.2]  Referring practitioner: Sandra Roe MD  Date of Initial Visit: Type: THERAPY  Noted: 2017  Today's Date: 2018  Patient seen for 14 sessions         Jannet Fierro reports:     Subjective   Patient reports her neck feels a lot better since last session.  Still has soreness but it is not as intense.  Reports the needling helped.    Objective   See Exercise, Manual, and Modality Logs for complete treatment.       Assessment/Plan  Patient tolerated treatment well with decreased pain and decreased soft tissue tightness in bilateral UT muscle.  Continues with extremely tight upper cervical/suboccipital muscle.  Patient is considering an additional dry needling session to address these muscles  Progress per Plan of Care           Manual Therapy:    15     mins  09534;  Therapeutic Exercise:         mins  53761;     Neuromuscular Candice:        mins  71430;    Therapeutic Activity:          mins  12656;     Gait Training:           mins  93770;     Ultrasound:     20     mins  87037;    Electrical Stimulation:         mins  27252 ( );  Dry Needling          mins self-pay    Timed Treatment:   35   mins   Total Treatment:     45   mins    Malou Mederos PT  Physical Therapist

## 2018-01-30 ENCOUNTER — OFFICE VISIT (OUTPATIENT)
Dept: FAMILY MEDICINE CLINIC | Facility: CLINIC | Age: 63
End: 2018-01-30

## 2018-01-30 VITALS
WEIGHT: 242 LBS | OXYGEN SATURATION: 99 % | BODY MASS INDEX: 40.32 KG/M2 | DIASTOLIC BLOOD PRESSURE: 72 MMHG | HEIGHT: 65 IN | SYSTOLIC BLOOD PRESSURE: 128 MMHG | HEART RATE: 78 BPM

## 2018-01-30 DIAGNOSIS — J34.89 RHINORRHEA: ICD-10-CM

## 2018-01-30 DIAGNOSIS — F32.0 MILD SINGLE CURRENT EPISODE OF MAJOR DEPRESSIVE DISORDER (HCC): ICD-10-CM

## 2018-01-30 DIAGNOSIS — M62.838 MUSCLE SPASM: Primary | ICD-10-CM

## 2018-01-30 DIAGNOSIS — H92.02 DISCOMFORT OF LEFT EAR: ICD-10-CM

## 2018-01-30 DIAGNOSIS — R22.1 NECK SWELLING: ICD-10-CM

## 2018-01-30 DIAGNOSIS — M54.2 NECK PAIN ON RIGHT SIDE: ICD-10-CM

## 2018-01-30 PROCEDURE — 99214 OFFICE O/P EST MOD 30 MIN: CPT | Performed by: FAMILY MEDICINE

## 2018-01-30 RX ORDER — CITALOPRAM 20 MG/1
20 TABLET ORAL DAILY
Qty: 90 TABLET | Refills: 1 | Status: SHIPPED | OUTPATIENT
Start: 2018-01-30 | End: 2018-07-31 | Stop reason: DRUGHIGH

## 2018-01-30 RX ORDER — ASPIRIN 81 MG/1
81 TABLET ORAL DAILY
Qty: 90 TABLET | Refills: 3 | Status: SHIPPED | OUTPATIENT
Start: 2018-01-30 | End: 2019-03-10 | Stop reason: SDUPTHER

## 2018-01-30 RX ORDER — METHOCARBAMOL 500 MG/1
750 TABLET, FILM COATED ORAL 4 TIMES DAILY
Qty: 180 TABLET | Refills: 2 | Status: SHIPPED | OUTPATIENT
Start: 2018-01-30 | End: 2018-04-23

## 2018-01-30 RX ORDER — FLUTICASONE PROPIONATE 50 MCG
2 SPRAY, SUSPENSION (ML) NASAL DAILY
Qty: 16 G | Refills: 1 | Status: SHIPPED | OUTPATIENT
Start: 2018-01-30 | End: 2018-06-18

## 2018-01-30 NOTE — PROGRESS NOTES
Subjective   Jannet DEE Kitarvind is a 62 y.o. female. Pt is here with concern for sinus infection. Follow up on back pain, leg pain for which she has been doing PT.    Chief Complaint   Patient presents with   • Establish Care   • Sinusitis     last week ,ears popping   • Med Refill     robaxin,aspirin 81mg, celexa        History of Present Illness  Ear discomfort  Ear popping, started last week. Symptoms started about 5 days ago with low grade fever. Took tylenol severe cold and flu for couple of days. Fever gone.  Popping cracking in ears and discomfort of left ear and cough persisting intermittently, some headaches as well.    Neck pain on right and bilateral swelling  PT concerned for lymphedema when PT massaging the neck, which pt expressed in email. Discussed at that time with upper body lymphedema would be prudent to do mammogram but pt does not do mammograms or colonoscopy. Feels the neck has been swollen for about one year. Has pain on the right, no trouble with swallowing. Does have overlying skin change with skin color change. Has not done any topicals or seen another doctor for this problem over the last year.    Weight  Pt is frustrated that the scale has not changed. Despite her efforts in her diet for weight loss she can't lose any weight. She monitored her caloric intake for 3 weeks on one of the multiple applications.  She remained at about 1200-calorie consumption per day.  At most she would have 1800 lots of time she had 1100.  She eats salads daily.  Watches her portions.  Does not eat sweets.  She was previously very physically active about 10 years ago when she was at her goal weight of 160-70.  She would like to get back to this.  Feels she is very restricted in her activities because of pain in her neck, low back, and arms.  She cannot do any bending.  She has been riding the bike and now increased it to 15 minutes each time she rides.  Does this about 3 times weekly.  Goal is to increase it to  "every day of the week.  Wondering why she is not losing weight.    The following portions of the patient's history were reviewed and updated as appropriate: allergies, current medications and problem list.    Review of Systems   Constitutional: Positive for activity change. Negative for appetite change, fever and unexpected weight change.   HENT: Positive for ear pain and rhinorrhea. Negative for congestion, ear discharge and sore throat.    Respiratory: Positive for cough.    Musculoskeletal: Positive for arthralgias, back pain, neck pain and neck stiffness.   Neurological: Positive for headaches.       Objective   Blood pressure 128/72, pulse 78, height 163.8 cm (64.5\"), weight 110 kg (242 lb), SpO2 99 %, not currently breastfeeding.  Physical Exam   Constitutional: She is oriented to person, place, and time. She appears well-nourished. No distress.   HENT:   Right Ear: External ear normal.   Left Ear: External ear normal.   Mouth/Throat: Oropharynx is clear and moist. No oropharyngeal exudate.   TMs appear normal, no bulge or opacity, no drainage. Small amount of cerumen in canal bilaterally.   Neck: Neck supple.   Musculoskeletal:   Anterior neck is soft, there is no pitting edema, there is soft swelling on the right inferolateral aspect associated with mild tenderness on palpation. No palpable cord, nodes, or nodules. Left side similar but non-tender.   Lymphadenopathy:     She has no cervical adenopathy.   Neurological: She is alert and oriented to person, place, and time.   Skin:   At base of anterior neck there is a slightly erythematous skin change superiorly and a pallor inferiorly over the chest. This is blanching. No isolated lesions.   Psychiatric: She has a normal mood and affect. Her behavior is normal.   Vitals reviewed.      Assessment/Plan   Jannet was seen today for establish care, sinusitis and med refill.    Diagnoses and all orders for this visit:    Muscle spasm    Mild single current episode " of major depressive disorder    Neck swelling  -     US head neck soft tissue; Future    Neck pain on right side  -     US head neck soft tissue; Future    Discomfort of left ear    Rhinorrhea    Other orders  -     methocarbamol (ROBAXIN) 500 MG tablet; Take 1.5 tablets by mouth 4 (Four) Times a Day.  -     citalopram (CELEXA) 20 MG tablet; Take 1 tablet by mouth Daily.  -     aspirin 81 MG EC tablet; Take 1 tablet by mouth Daily.  -     fluticasone (FLONASE) 50 MCG/ACT nasal spray; 2 sprays into each nostril Daily.    neck pain and swelling  Not felt to be lymphedema on exam  Will get ultrasound to further evaluate  This is not a new problem, ongoing for one year

## 2018-02-02 ENCOUNTER — HOSPITAL ENCOUNTER (OUTPATIENT)
Dept: ULTRASOUND IMAGING | Facility: HOSPITAL | Age: 63
Discharge: HOME | End: 2018-02-02
Admitting: FAMILY MEDICINE

## 2018-02-02 DIAGNOSIS — R22.1 NECK SWELLING: ICD-10-CM

## 2018-02-02 DIAGNOSIS — M54.2 NECK PAIN ON RIGHT SIDE: ICD-10-CM

## 2018-02-02 PROCEDURE — 76536 US EXAM OF HEAD AND NECK: CPT

## 2018-02-12 DIAGNOSIS — R13.19 OTHER DYSPHAGIA: ICD-10-CM

## 2018-02-12 DIAGNOSIS — E04.2 MULTIPLE THYROID NODULES: Primary | ICD-10-CM

## 2018-02-12 DIAGNOSIS — R22.1 NECK MASS: ICD-10-CM

## 2018-02-14 ENCOUNTER — TELEPHONE (OUTPATIENT)
Dept: FAMILY MEDICINE CLINIC | Facility: CLINIC | Age: 63
End: 2018-02-14

## 2018-02-14 NOTE — TELEPHONE ENCOUNTER
Spoke with patient about her ultrasound results, she is ok with the ENT referral and prefers it be at [place].

## 2018-02-21 ENCOUNTER — TREATMENT (OUTPATIENT)
Dept: PHYSICAL THERAPY | Facility: CLINIC | Age: 63
End: 2018-02-21

## 2018-02-21 DIAGNOSIS — M54.12 RADICULOPATHY, CERVICAL: ICD-10-CM

## 2018-02-21 DIAGNOSIS — M54.2 PAIN, NECK: Primary | ICD-10-CM

## 2018-02-21 PROCEDURE — 97140 MANUAL THERAPY 1/> REGIONS: CPT | Performed by: PHYSICAL THERAPIST

## 2018-02-21 PROCEDURE — DRYNDL PR CUSTOM DRY NEEDLING SELF PAY: Performed by: PHYSICAL THERAPIST

## 2018-02-21 PROCEDURE — 97035 APP MDLTY 1+ULTRASOUND EA 15: CPT | Performed by: PHYSICAL THERAPIST

## 2018-02-21 NOTE — PROGRESS NOTES
Physical Therapy Daily Progress Note  15 treatments  Subjective     Jannet Fierro reports: here today for dry needling treatment.         Objective   See Exercise, Manual, and Modality Logs for complete treatment.     Soft tissue was assessed at (B) upper traps. PT noted point tenderness as well as palpable trigger points within the muslce tissue. On this date patient stated that they would like to undergo a dry needling procedure for the soft tissue dysfunction. Patient was educated on the procedure for dry needling and consent waver was signed. Patient was informed of the risks, possible adverse effects, along with the benefits of TDN.     Assessment/Plan  Patient tolerated all treatment well and LTR was seen in all targeted muscle groups.   Progress per Plan of Care           Manual Therapy:         mins  91712;  Therapeutic Exercise:         mins  21437;     Neuromuscular Candice:        mins  27671;    Therapeutic Activity:          mins  08370;     Gait Training:           mins  52715;     Ultrasound:          mins  22542;    Electrical Stimulation:         mins  21368 ( );  Dry Needling     12     mins self-pay    Timed Treatment:      mins   Total Treatment:     12   mins    Sergio Nair PT  Physical Therapist  [place] License # [id number]

## 2018-02-21 NOTE — PROGRESS NOTES
Physical Therapy Re-Assessment / Re-Certification        Patient: Jannet Fierro   : 1955  Diagnosis/ICD-10 Code:  Pain, neck [M54.2]  Referring practitioner: Sandra Reo MD  Date of Initial Visit: Type: THERAPY  Noted: 2017  Today's Date: 2018  Patient seen for 16 sessions      Subjective:   Jannet Fierro reports:     Clinical Progress: Initially improved, recently symptoms increased   Home Program Compliance: Yes  Treatment has included: therapeutic exercise, manual therapy, ultrasound and dry needling    Subjective   Patient reports her neck felt a lot better for about 3 weeks and then started to gradually tighten back up.  Reports she is having the nagging, low grade headaches again also.    Objective       Palpation   Left   Hypertonic in the cervical paraspinals, levator scapulae, suboccipitals and upper trapezius.   Tenderness of the cervical paraspinals, levator scapulae, suboccipitals and upper trapezius.     Right   Hypertonic in the cervical paraspinals, levator scapulae, suboccipitals and upper trapezius. Tenderness of the cervical paraspinals, levator scapulae, suboccipitals and upper trapezius.     Active Range of Motion     Additional Active Range of Motion Details  Grossly limited 50%         Assessment & Plan       Goals  Plan Goals: Short Term Goals (2-3 wks):  1.  Patient will have increased cervical spine ROM to WFLs to allow for increased functional joint mobility.  2. Patient will have increased resting tone in cervical spine muscle WNLs.  3.  Patient will have decreased pain to 3/10 or less to allow for increased comfort with functional activities and allow for improved restorative sleep.    Long Term Goals (3-4 wks):  1.  Patient will be independent in performance of HEP for carryover upon discharge from skilled PT services.  2.  Patient will report return to performance of ADLs/Work/Sport/Leisure activities with minimal to no symptom reproduction/pain  limitations.      Plan  Therapy options: will be seen for skilled physical therapy services  Planned modality interventions: ultrasound  Other planned modality interventions: Dry needling  Planned therapy interventions: manual therapy, soft tissue mobilization, flexibility, stretching, joint mobilization and home exercise program  Frequency: 1x week  Duration in weeks: 6  Treatment plan discussed with: patient  Plan details: Pt was educated on the importance of their HEP and their current need for continued skilled physical therapy. Patients goals and potential limitations were discussed and pt is in agreement with current plan of care and treatment emphasis.        Prognosis to achieve goals: good    PT Signature: Malou Mederos, PT      Based upon review of the patient's progress and continued therapy plan, it is my medical opinion that Jannet Fierro should continue physical therapy treatment at [facility]  [address] [place] [id number] [phone].    Signature: __________________________________  Sandra Roe MD    Manual Therapy:    15     mins  49164;  Therapeutic Exercise:         mins  17836;     Neuromuscular Candice:        mins  83832;    Therapeutic Activity:          mins  07324;     Gait Training:           mins  49045;     Ultrasound:     16     mins  98890;    Electrical Stimulation:         mins  04381 ( );  Dry Needling          mins self-pay    Timed Treatment:   31   mins   Total Treatment:     45   mins

## 2018-02-27 ENCOUNTER — TREATMENT (OUTPATIENT)
Dept: PHYSICAL THERAPY | Facility: CLINIC | Age: 63
End: 2018-02-27

## 2018-02-27 DIAGNOSIS — M54.2 PAIN, NECK: Primary | ICD-10-CM

## 2018-02-27 DIAGNOSIS — M54.12 RADICULOPATHY, CERVICAL: ICD-10-CM

## 2018-02-27 PROCEDURE — DRYNDL PR CUSTOM DRY NEEDLING SELF PAY: Performed by: PHYSICAL THERAPIST

## 2018-02-27 PROCEDURE — 97140 MANUAL THERAPY 1/> REGIONS: CPT | Performed by: PHYSICAL THERAPIST

## 2018-02-27 PROCEDURE — 97035 APP MDLTY 1+ULTRASOUND EA 15: CPT | Performed by: PHYSICAL THERAPIST

## 2018-02-27 NOTE — PROGRESS NOTES
Physical Therapy Daily Progress Note        Patient: Jannet Fierro   : 1955  Diagnosis/ICD-10 Code:  Pain, neck [M54.2]  Referring practitioner: Sandra Roe MD  Date of Initial Visit: Type: THERAPY  Noted: 2017  Today's Date: 2018  Patient seen for 17 sessions         Jannet Fierro reports:       Subjective   Patient reports she had decreased pain since treatment last session.  Reports improved HA symptoms and less frequent.  States she received dry needling treatment this morning with a good muscle response.    Objective   See Exercise, Manual, and Modality Logs for complete treatment.       Assessment/Plan  Patient tolerated treatment well with decreased TTP and decreased tone in bilateral UT muscles.  Progress per Plan of Care           Manual Therapy:    20     mins  93887;  Therapeutic Exercise:         mins  10154;     Neuromuscular Candice:        mins  30226;    Therapeutic Activity:          mins  79128;     Gait Training:           mins  90175;     Ultrasound:     2/8     mins  56601;    Electrical Stimulation:         mins  56886 ( );  Dry Needling          mins self-pay    Timed Treatment:   30   mins   Total Treatment:     30   mins    Malou Mederos PT  Physical Therapist

## 2018-02-27 NOTE — PROGRESS NOTES
Physical Therapy Daily Progress Note  17 treatments  Subjective     Jannet Vadim reports: Patient here for TDN treatment of upper trap.        Objective   See Exercise, Manual, and Modality Logs for complete treatment.     Soft tissue was assessed at R upper trap. PT noted point tenderness as well as palpable trigger points within the muslce tissue. On this date patient stated that they would like to undergo a dry needling procedure for the soft tissue dysfunction. Patient was educated on the procedure for dry needling and consent waver was signed. Patient was informed of the risks, possible adverse effects, along with the benefits of TDN.     Assessment/Plan  Patient tolerated all treatment well. LTR was seen in all targeted muscles. Will continue to treat as indicated by primary PT.   Progress per Plan of Care           Manual Therapy:         mins  45798;  Therapeutic Exercise:         mins  43214;     Neuromuscular Candice:        mins  19621;    Therapeutic Activity:          mins  16194;     Gait Training:           mins  16287;     Ultrasound:          mins  96109;    Electrical Stimulation:         mins  88690 ( );  Dry Needling     12     mins self-pay    Timed Treatment:      mins   Total Treatment:     12   mins    Sergio Nair PT  Physical Therapist  [place] License # [id number]

## 2018-03-01 DIAGNOSIS — E04.2 MULTIPLE THYROID NODULES: Primary | ICD-10-CM

## 2018-03-05 ENCOUNTER — TREATMENT (OUTPATIENT)
Dept: PHYSICAL THERAPY | Facility: CLINIC | Age: 63
End: 2018-03-05

## 2018-03-05 DIAGNOSIS — M54.12 RADICULOPATHY, CERVICAL: ICD-10-CM

## 2018-03-05 DIAGNOSIS — M54.2 PAIN, NECK: Primary | ICD-10-CM

## 2018-03-05 PROCEDURE — DRYNDL PR CUSTOM DRY NEEDLING SELF PAY: Performed by: PHYSICAL THERAPIST

## 2018-03-05 PROCEDURE — 97140 MANUAL THERAPY 1/> REGIONS: CPT | Performed by: PHYSICAL THERAPIST

## 2018-03-05 PROCEDURE — 97035 APP MDLTY 1+ULTRASOUND EA 15: CPT | Performed by: PHYSICAL THERAPIST

## 2018-03-05 NOTE — PROGRESS NOTES
Physical Therapy Daily Progress Note        Patient: Jnanet Fierro   : 1955  Diagnosis/ICD-10 Code:  Pain, neck [M54.2]  Referring practitioner: Sandra Roe MD  Date of Initial Visit: Type: THERAPY  Noted: 2017  Today's Date: 3/5/2018  Patient seen for 19 sessions         Jannet Fierro reports:       Subjective   Patient reports her neck/shoulders have been pretty sore but she has been having less stiffness and has been sleeping better.  Received dry needling treatment.    Objective   See Exercise, Manual, and Modality Logs for complete treatment.       Assessment/Plan  Patient tolerated treatment well with decreased pain and muscle tightness post treatment.  Progress per Plan of Care           Manual Therapy:    20     mins  55603;  Therapeutic Exercise:         mins  34424;     Neuromuscular Candice:        mins  34822;    Therapeutic Activity:          mins  47654;     Gait Training:           mins  08669;     Ultrasound:     12     mins  81281;    Electrical Stimulation:         mins  18724 (MC );  Dry Needling          mins self-pay    Timed Treatment:   32   mins   Total Treatment:     45   mins    Malou Mederos PT  Physical Therapist

## 2018-03-06 ENCOUNTER — RESULTS ENCOUNTER (OUTPATIENT)
Dept: FAMILY MEDICINE CLINIC | Facility: CLINIC | Age: 63
End: 2018-03-06

## 2018-03-06 DIAGNOSIS — E04.2 MULTIPLE THYROID NODULES: ICD-10-CM

## 2018-03-14 LAB — THYROPEROXIDASE AB SERPL-ACNC: 7 IU/ML (ref 0–34)

## 2018-03-22 ENCOUNTER — TELEPHONE (OUTPATIENT)
Dept: FAMILY MEDICINE CLINIC | Facility: CLINIC | Age: 63
End: 2018-03-22

## 2018-03-22 DIAGNOSIS — R22.1 NECK SWELLING: Primary | ICD-10-CM

## 2018-03-22 DIAGNOSIS — M25.50 ARTHRALGIA, UNSPECIFIED JOINT: ICD-10-CM

## 2018-03-22 DIAGNOSIS — R63.5 WEIGHT GAIN: ICD-10-CM

## 2018-03-22 NOTE — TELEPHONE ENCOUNTER
Spoke with patient about her lab results, patient stated she can come in for further testing on the thyroid.

## 2018-03-23 ENCOUNTER — RESULTS ENCOUNTER (OUTPATIENT)
Dept: FAMILY MEDICINE CLINIC | Facility: CLINIC | Age: 63
End: 2018-03-23

## 2018-03-23 DIAGNOSIS — M25.50 ARTHRALGIA, UNSPECIFIED JOINT: ICD-10-CM

## 2018-03-23 DIAGNOSIS — R63.5 WEIGHT GAIN: ICD-10-CM

## 2018-03-23 DIAGNOSIS — R22.1 NECK SWELLING: ICD-10-CM

## 2018-03-23 LAB — TSH SERPL DL<=0.005 MIU/L-ACNC: 2.33 MIU/ML (ref 0.27–4.2)

## 2018-04-04 ENCOUNTER — TRANSCRIBE ORDERS (OUTPATIENT)
Dept: ADMINISTRATIVE | Facility: HOSPITAL | Age: 63
End: 2018-04-04

## 2018-04-04 DIAGNOSIS — E04.1 THYROID NODULE: Primary | ICD-10-CM

## 2018-04-06 ENCOUNTER — TELEPHONE (OUTPATIENT)
Dept: FAMILY MEDICINE CLINIC | Facility: CLINIC | Age: 63
End: 2018-04-06

## 2018-04-18 ENCOUNTER — HOSPITAL ENCOUNTER (OUTPATIENT)
Dept: ULTRASOUND IMAGING | Facility: HOSPITAL | Age: 63
Discharge: HOME | End: 2018-04-18
Attending: OTOLARYNGOLOGY | Admitting: OTOLARYNGOLOGY

## 2018-04-18 DIAGNOSIS — E04.1 THYROID NODULE: ICD-10-CM

## 2018-04-18 PROCEDURE — 76536 US EXAM OF HEAD AND NECK: CPT

## 2018-04-23 DIAGNOSIS — G89.29 CHRONIC PAIN OF BOTH SHOULDERS: ICD-10-CM

## 2018-04-23 DIAGNOSIS — M79.10 MUSCLE PAIN: ICD-10-CM

## 2018-04-23 DIAGNOSIS — M79.89 SWELLING OF BOTH HANDS: ICD-10-CM

## 2018-04-23 DIAGNOSIS — R60.9 SWELLING: Primary | ICD-10-CM

## 2018-04-23 DIAGNOSIS — M25.512 CHRONIC PAIN OF BOTH SHOULDERS: ICD-10-CM

## 2018-04-23 DIAGNOSIS — M25.511 CHRONIC PAIN OF BOTH SHOULDERS: ICD-10-CM

## 2018-04-23 RX ORDER — MELOXICAM 15 MG/1
15 TABLET ORAL DAILY
Qty: 30 TABLET | Refills: 2 | Status: SHIPPED | OUTPATIENT
Start: 2018-04-23 | End: 2018-07-11 | Stop reason: SDUPTHER

## 2018-04-23 RX ORDER — CYCLOBENZAPRINE HCL 10 MG
10 TABLET ORAL 3 TIMES DAILY PRN
Qty: 90 TABLET | Refills: 1 | Status: SHIPPED | OUTPATIENT
Start: 2018-04-23 | End: 2018-06-11 | Stop reason: SDUPTHER

## 2018-04-24 ENCOUNTER — LAB (OUTPATIENT)
Dept: FAMILY MEDICINE CLINIC | Facility: CLINIC | Age: 63
End: 2018-04-24

## 2018-04-24 DIAGNOSIS — M25.511 CHRONIC PAIN OF BOTH SHOULDERS: ICD-10-CM

## 2018-04-24 DIAGNOSIS — G89.29 CHRONIC PAIN OF BOTH SHOULDERS: ICD-10-CM

## 2018-04-24 DIAGNOSIS — M25.512 CHRONIC PAIN OF BOTH SHOULDERS: ICD-10-CM

## 2018-04-24 DIAGNOSIS — M79.10 MUSCLE PAIN: ICD-10-CM

## 2018-04-24 DIAGNOSIS — R60.9 SWELLING: ICD-10-CM

## 2018-04-24 DIAGNOSIS — E04.2 MULTIPLE THYROID NODULES: ICD-10-CM

## 2018-04-24 DIAGNOSIS — M79.89 SWELLING OF BOTH HANDS: ICD-10-CM

## 2018-04-25 LAB
ALBUMIN SERPL-MCNC: 4.3 G/DL (ref 3.5–5.2)
ALBUMIN/GLOB SERPL: 1.6 G/DL
ALP SERPL-CCNC: 81 U/L (ref 39–117)
ALT SERPL-CCNC: 20 U/L (ref 1–33)
ANA SER QL: NEGATIVE
AST SERPL-CCNC: 25 U/L (ref 1–32)
BILIRUB SERPL-MCNC: 0.4 MG/DL (ref 0.1–1.2)
BUN SERPL-MCNC: 21 MG/DL (ref 8–23)
BUN/CREAT SERPL: 27.3 (ref 7–25)
CALCIUM SERPL-MCNC: 9.6 MG/DL (ref 8.6–10.5)
CHLORIDE SERPL-SCNC: 104 MMOL/L (ref 98–107)
CK SERPL-CCNC: 83 U/L (ref 20–180)
CO2 SERPL-SCNC: 27.2 MMOL/L (ref 22–29)
CREAT SERPL-MCNC: 0.77 MG/DL (ref 0.57–1)
CRP SERPL-MCNC: 0.77 MG/DL (ref 0–0.5)
ERYTHROCYTE [SEDIMENTATION RATE] IN BLOOD BY WESTERGREN METHOD: 1 MM/HR (ref 0–30)
GFR SERPLBLD CREATININE-BSD FMLA CKD-EPI: 76 ML/MIN/1.73
GFR SERPLBLD CREATININE-BSD FMLA CKD-EPI: 92 ML/MIN/1.73
GLOBULIN SER CALC-MCNC: 2.7 GM/DL
GLUCOSE SERPL-MCNC: 102 MG/DL (ref 65–99)
POTASSIUM SERPL-SCNC: 4.3 MMOL/L (ref 3.5–5.2)
PROT SERPL-MCNC: 7 G/DL (ref 6–8.5)
RHEUMATOID FACT SERPL-ACNC: 13.1 IU/ML (ref 0–13.9)
SODIUM SERPL-SCNC: 145 MMOL/L (ref 136–145)
TSH SERPL DL<=0.005 MIU/L-ACNC: 2.66 MIU/ML (ref 0.27–4.2)

## 2018-06-11 RX ORDER — CYCLOBENZAPRINE HCL 10 MG
TABLET ORAL
Qty: 90 TABLET | Refills: 0 | Status: SHIPPED | OUTPATIENT
Start: 2018-06-11 | End: 2018-07-11 | Stop reason: SDUPTHER

## 2018-06-21 ENCOUNTER — TRANSCRIBE ORDERS (OUTPATIENT)
Dept: ADMINISTRATIVE | Facility: HOSPITAL | Age: 63
End: 2018-06-21

## 2018-06-21 DIAGNOSIS — E04.2 MULTIPLE THYROID NODULES: Primary | ICD-10-CM

## 2018-07-12 RX ORDER — MELOXICAM 15 MG/1
15 TABLET ORAL DAILY
Qty: 30 TABLET | Refills: 0 | Status: SHIPPED | OUTPATIENT
Start: 2018-07-12 | End: 2018-08-13 | Stop reason: SDUPTHER

## 2018-07-12 RX ORDER — CYCLOBENZAPRINE HCL 10 MG
TABLET ORAL
Qty: 90 TABLET | Refills: 0 | Status: SHIPPED | OUTPATIENT
Start: 2018-07-12 | End: 2018-08-13 | Stop reason: SDUPTHER

## 2018-07-18 RX ORDER — AMOXICILLIN 875 MG/1
875 TABLET, COATED ORAL EVERY 12 HOURS SCHEDULED
Qty: 14 TABLET | Refills: 0 | Status: SHIPPED | OUTPATIENT
Start: 2018-07-18 | End: 2018-07-31

## 2018-07-30 ENCOUNTER — HOSPITAL ENCOUNTER (OUTPATIENT)
Dept: ULTRASOUND IMAGING | Facility: HOSPITAL | Age: 63
Discharge: HOME | End: 2018-07-30
Attending: OTOLARYNGOLOGY | Admitting: OTOLARYNGOLOGY

## 2018-07-30 DIAGNOSIS — E04.2 MULTIPLE THYROID NODULES: ICD-10-CM

## 2018-07-30 PROCEDURE — 76536 US EXAM OF HEAD AND NECK: CPT

## 2018-07-31 ENCOUNTER — OFFICE VISIT (OUTPATIENT)
Dept: FAMILY MEDICINE CLINIC | Facility: CLINIC | Age: 63
End: 2018-07-31

## 2018-07-31 VITALS
OXYGEN SATURATION: 100 % | SYSTOLIC BLOOD PRESSURE: 132 MMHG | BODY MASS INDEX: 41.48 KG/M2 | DIASTOLIC BLOOD PRESSURE: 78 MMHG | WEIGHT: 249 LBS | HEIGHT: 65 IN | HEART RATE: 110 BPM

## 2018-07-31 DIAGNOSIS — R61 EXCESSIVE SWEATING: ICD-10-CM

## 2018-07-31 DIAGNOSIS — R63.5 WEIGHT GAIN: ICD-10-CM

## 2018-07-31 DIAGNOSIS — F33.1 MODERATE EPISODE OF RECURRENT MAJOR DEPRESSIVE DISORDER (HCC): ICD-10-CM

## 2018-07-31 DIAGNOSIS — J01.00 SUBACUTE MAXILLARY SINUSITIS: Primary | ICD-10-CM

## 2018-07-31 LAB
BASOPHILS # BLD AUTO: 0.05 10*3/MM3 (ref 0–0.2)
BASOPHILS NFR BLD AUTO: 0.7 % (ref 0–1.5)
EOSINOPHIL # BLD AUTO: 0.23 10*3/MM3 (ref 0–0.7)
EOSINOPHIL NFR BLD AUTO: 3.4 % (ref 0.3–6.2)
ERYTHROCYTE [DISTWIDTH] IN BLOOD BY AUTOMATED COUNT: 13.6 % (ref 11.7–13)
HCT VFR BLD AUTO: 48.3 % (ref 35.6–45.5)
HGB BLD-MCNC: 14.6 G/DL (ref 11.9–15.5)
IMM GRANULOCYTES # BLD: 0 10*3/MM3 (ref 0–0.03)
IMM GRANULOCYTES NFR BLD: 0 % (ref 0–0.5)
LYMPHOCYTES # BLD AUTO: 1.49 10*3/MM3 (ref 0.9–4.8)
LYMPHOCYTES NFR BLD AUTO: 22.2 % (ref 19.6–45.3)
MCH RBC QN AUTO: 29.1 PG (ref 26.9–32)
MCHC RBC AUTO-ENTMCNC: 30.2 G/DL (ref 32.4–36.3)
MCV RBC AUTO: 96.4 FL (ref 80.5–98.2)
MONOCYTES # BLD AUTO: 0.5 10*3/MM3 (ref 0.2–1.2)
MONOCYTES NFR BLD AUTO: 7.4 % (ref 5–12)
NEUTROPHILS # BLD AUTO: 4.45 10*3/MM3 (ref 1.9–8.1)
NEUTROPHILS NFR BLD AUTO: 66.3 % (ref 42.7–76)
PLATELET # BLD AUTO: 220 10*3/MM3 (ref 140–500)
RBC # BLD AUTO: 5.01 10*6/MM3 (ref 3.9–5.2)
TSH SERPL DL<=0.005 MIU/L-ACNC: 2.58 MIU/ML (ref 0.27–4.2)
WBC # BLD AUTO: 6.72 10*3/MM3 (ref 4.5–10.7)

## 2018-07-31 PROCEDURE — 99214 OFFICE O/P EST MOD 30 MIN: CPT | Performed by: FAMILY MEDICINE

## 2018-07-31 RX ORDER — DIPHENHYDRAMINE HCL 25 MG
25 CAPSULE ORAL EVERY 6 HOURS PRN
COMMUNITY
End: 2019-11-19

## 2018-07-31 RX ORDER — GUAIFENESIN 600 MG/1
1200 TABLET, EXTENDED RELEASE ORAL 2 TIMES DAILY
COMMUNITY
End: 2019-08-21

## 2018-07-31 RX ORDER — CITALOPRAM 40 MG/1
40 TABLET ORAL DAILY
COMMUNITY
End: 2018-10-09 | Stop reason: SDUPTHER

## 2018-07-31 RX ORDER — DOXYCYCLINE HYCLATE 100 MG/1
100 TABLET, DELAYED RELEASE ORAL 2 TIMES DAILY
Qty: 14 TABLET | Refills: 0 | Status: SHIPPED | OUTPATIENT
Start: 2018-07-31 | End: 2019-08-21

## 2018-07-31 RX ORDER — CETIRIZINE HYDROCHLORIDE 10 MG/1
10 TABLET ORAL DAILY
Qty: 90 TABLET | Refills: 1 | Status: SHIPPED | OUTPATIENT
Start: 2018-07-31 | End: 2019-08-21

## 2018-07-31 NOTE — PROGRESS NOTES
Subjective   Jannet Fierro is a 62 y.o. female.     Chief Complaint   Patient presents with   • Sinus Problem   • Excessive Sweating   • Thyroid Problem     ultrasound results         History of Present Illness  Sinus problem  Continues to have significant congestion. Has never had allergies that bad, she is using flonase regularly and taking mucinex and benadryl as needed. Cough is ongoing, seems more in the chest again. She sleeps with CPAP at least 4-5 hours per night. Sleeping with a fan in her room but not directly on her.     Excessive sweating  Has been going on since the spring. She gets ready for work then sits to watch TV and her head and neck area will sweat profusely and under arms. She has to change her shirts a couple times per day. Sweats all day and all night. No flushing, redness. Not associated with feeling ill, abdominal pain, nausea, vomiting, diarrhea, headaches, no LAD. Has to have a fan on her constantly when she is at home. No flushing, no hot flashes.     Thyroid  Ultrasound performed yesterday, report not back.   Continues to gain weight. Watches her intake, counts calories, she gets about 6288-3036 calories per day. Continues to gain weight. Not really riding the bike anymore. No motivation to get on it. Mostly sitting, feels listless and lethargic. Feels like this is mostly because of her pain and feeling frustrated. Just has no desire to exercise in the evening when she gets home. Walks the halls at work time to time, otherwise she is sitting in her cubical.     The following portions of the patient's history were reviewed and updated as appropriate: allergies, current medications and problem list.    Review of Systems   Constitutional: Positive for activity change, diaphoresis, fatigue and unexpected weight change. Negative for appetite change and fever.   HENT: Positive for congestion, postnasal drip and sinus pressure.    Respiratory: Positive for cough. Negative for shortness of  "breath.    Cardiovascular: Negative for chest pain and leg swelling.   Gastrointestinal: Negative for abdominal pain, blood in stool, constipation, diarrhea, nausea and vomiting.   Musculoskeletal: Positive for back pain, myalgias, neck pain and neck stiffness.   Neurological: Negative for headaches.   Hematological: Negative for adenopathy.   Psychiatric/Behavioral: Positive for dysphoric mood and sleep disturbance.       Objective   Blood pressure 132/78, pulse 110, height 163.8 cm (64.5\"), weight 113 kg (249 lb), SpO2 100 %, not currently breastfeeding.  Physical Exam   Constitutional: She is oriented to person, place, and time. She appears well-nourished. No distress.   HENT:   Right Ear: External ear normal.   Left Ear: External ear normal.   Nose: Nose normal.   Mouth/Throat: Oropharynx is clear and moist.   Eyes: Conjunctivae are normal. Right eye exhibits no discharge. Left eye exhibits no discharge. No scleral icterus.   Neck: Neck supple. No thyromegaly present.   Cardiovascular: Normal rate, regular rhythm, normal heart sounds and intact distal pulses.    No murmur heard.  Pulmonary/Chest: Effort normal and breath sounds normal. No respiratory distress. She has no wheezes.   Musculoskeletal: She exhibits no edema.   Lymphadenopathy:     She has no cervical adenopathy.   Neurological: She is alert and oriented to person, place, and time. She exhibits normal muscle tone.   Psychiatric: She has a normal mood and affect. Her behavior is normal.   Vitals reviewed.      Assessment/Plan   Jannet was seen today for sinus problem, excessive sweating and thyroid problem.    Diagnoses and all orders for this visit:    Subacute maxillary sinusitis    Excessive sweating  -     CBC & Differential  -     TSH Rfx On Abnormal To Free T4  -     Ambulatory Referral to Endocrinology    Weight gain    Moderate episode of recurrent major depressive disorder (CMS/HCC)    Other orders  -     cetirizine (zyrTEC) 10 MG tablet; Take " 1 tablet by mouth Daily.  -     doxycycline (DORYX) 100 MG enteric coated tablet; Take 1 tablet by mouth 2 (Two) Times a Day.    will do one more round of abx, sounds like in chest with deep cough. Also give steroid inhaler for one month.   Unclear etiology of the sweating. Would like to have help with further evaluation from endocrine. She also had some tachycardia on vitals today, did not take the stairs, improved on exam.   Her mood is affected by all her symptoms and likely effecting her symptoms. She does not desire doing anything in the evenings, feeling fatigue. We decided to increase her citalopram at this time. If any side effects at higher dose she should go back down and let us know.   With the new sweats will check CBC and TSH today and refer to endocrine for further evaluation.

## 2018-08-09 ENCOUNTER — TELEPHONE (OUTPATIENT)
Dept: FAMILY MEDICINE CLINIC | Facility: CLINIC | Age: 63
End: 2018-08-09

## 2018-08-13 RX ORDER — CITALOPRAM 40 MG/1
40 TABLET ORAL DAILY
Qty: 90 TABLET | Refills: 0 | Status: SHIPPED | OUTPATIENT
Start: 2018-08-13 | End: 2018-10-08 | Stop reason: SDUPTHER

## 2018-08-13 RX ORDER — CYCLOBENZAPRINE HCL 10 MG
TABLET ORAL
Qty: 90 TABLET | Refills: 0 | Status: SHIPPED | OUTPATIENT
Start: 2018-08-13 | End: 2018-09-12 | Stop reason: SDUPTHER

## 2018-08-13 RX ORDER — MELOXICAM 15 MG/1
15 TABLET ORAL DAILY
Qty: 30 TABLET | Refills: 0 | Status: SHIPPED | OUTPATIENT
Start: 2018-08-13 | End: 2018-09-12 | Stop reason: SDUPTHER

## 2018-09-12 RX ORDER — MELOXICAM 15 MG/1
15 TABLET ORAL DAILY
Qty: 30 TABLET | Refills: 0 | Status: SHIPPED | OUTPATIENT
Start: 2018-09-12 | End: 2018-10-10 | Stop reason: SDUPTHER

## 2018-09-12 RX ORDER — CYCLOBENZAPRINE HCL 10 MG
TABLET ORAL
Qty: 90 TABLET | Refills: 0 | Status: SHIPPED | OUTPATIENT
Start: 2018-09-12 | End: 2018-10-10 | Stop reason: SDUPTHER

## 2018-10-09 RX ORDER — CITALOPRAM 40 MG/1
40 TABLET ORAL DAILY
Qty: 90 TABLET | Refills: 1 | Status: SHIPPED | OUTPATIENT
Start: 2018-10-09 | End: 2018-10-12 | Stop reason: SDUPTHER

## 2018-10-10 RX ORDER — MELOXICAM 15 MG/1
15 TABLET ORAL DAILY
Qty: 30 TABLET | Refills: 0 | Status: SHIPPED | OUTPATIENT
Start: 2018-10-10 | End: 2018-11-09 | Stop reason: SDUPTHER

## 2018-10-10 RX ORDER — CYCLOBENZAPRINE HCL 10 MG
TABLET ORAL
Qty: 90 TABLET | Refills: 0 | Status: SHIPPED | OUTPATIENT
Start: 2018-10-10 | End: 2018-11-09 | Stop reason: SDUPTHER

## 2018-10-12 RX ORDER — CITALOPRAM 40 MG/1
80 TABLET ORAL DAILY
Qty: 180 TABLET | Refills: 1 | Status: SHIPPED | OUTPATIENT
Start: 2018-10-12 | End: 2018-10-12 | Stop reason: SDUPTHER

## 2018-10-12 RX ORDER — CITALOPRAM 40 MG/1
40 TABLET ORAL DAILY
Qty: 90 TABLET | Refills: 1 | Status: SHIPPED | OUTPATIENT
Start: 2018-10-12 | End: 2019-11-19 | Stop reason: ALTCHOICE

## 2018-11-09 RX ORDER — CYCLOBENZAPRINE HCL 10 MG
TABLET ORAL
Qty: 90 TABLET | Refills: 0 | Status: SHIPPED | OUTPATIENT
Start: 2018-11-09 | End: 2018-12-08 | Stop reason: SDUPTHER

## 2018-11-09 RX ORDER — MELOXICAM 15 MG/1
15 TABLET ORAL DAILY
Qty: 30 TABLET | Refills: 0 | Status: SHIPPED | OUTPATIENT
Start: 2018-11-09 | End: 2018-12-08 | Stop reason: SDUPTHER

## 2018-12-11 RX ORDER — CYCLOBENZAPRINE HCL 10 MG
TABLET ORAL
Qty: 90 TABLET | Refills: 0 | Status: SHIPPED | OUTPATIENT
Start: 2018-12-11 | End: 2019-01-09 | Stop reason: SDUPTHER

## 2018-12-11 RX ORDER — MELOXICAM 15 MG/1
15 TABLET ORAL DAILY
Qty: 30 TABLET | Refills: 0 | Status: SHIPPED | OUTPATIENT
Start: 2018-12-11 | End: 2019-01-09 | Stop reason: SDUPTHER

## 2019-01-09 RX ORDER — MELOXICAM 15 MG/1
15 TABLET ORAL DAILY
Qty: 30 TABLET | Refills: 0 | Status: SHIPPED | OUTPATIENT
Start: 2019-01-09 | End: 2019-02-07 | Stop reason: SDUPTHER

## 2019-01-09 RX ORDER — CYCLOBENZAPRINE HCL 10 MG
TABLET ORAL
Qty: 90 TABLET | Refills: 0 | Status: SHIPPED | OUTPATIENT
Start: 2019-01-09 | End: 2019-02-07 | Stop reason: SDUPTHER

## 2019-02-07 RX ORDER — CYCLOBENZAPRINE HCL 10 MG
TABLET ORAL
Qty: 90 TABLET | Refills: 0 | Status: SHIPPED | OUTPATIENT
Start: 2019-02-07 | End: 2019-03-10 | Stop reason: SDUPTHER

## 2019-02-07 RX ORDER — MELOXICAM 15 MG/1
15 TABLET ORAL DAILY
Qty: 30 TABLET | Refills: 0 | Status: SHIPPED | OUTPATIENT
Start: 2019-02-07 | End: 2019-03-10 | Stop reason: SDUPTHER

## 2019-03-11 RX ORDER — MELOXICAM 15 MG/1
15 TABLET ORAL DAILY
Qty: 30 TABLET | Refills: 0 | Status: SHIPPED | OUTPATIENT
Start: 2019-03-11 | End: 2019-04-13 | Stop reason: SDUPTHER

## 2019-03-11 RX ORDER — ASPIRIN 81 MG/1
TABLET, COATED ORAL
Qty: 90 TABLET | Refills: 0 | Status: SHIPPED | OUTPATIENT
Start: 2019-03-11 | End: 2019-06-13 | Stop reason: SDUPTHER

## 2019-03-11 RX ORDER — CYCLOBENZAPRINE HCL 10 MG
TABLET ORAL
Qty: 90 TABLET | Refills: 0 | Status: SHIPPED | OUTPATIENT
Start: 2019-03-11 | End: 2019-04-13 | Stop reason: SDUPTHER

## 2019-04-08 RX ORDER — CITALOPRAM 40 MG/1
40 TABLET ORAL DAILY
Qty: 90 TABLET | Refills: 0 | Status: SHIPPED | OUTPATIENT
Start: 2019-04-08 | End: 2019-07-10 | Stop reason: SDUPTHER

## 2019-04-15 RX ORDER — MELOXICAM 15 MG/1
15 TABLET ORAL DAILY
Qty: 60 TABLET | Refills: 0 | Status: SHIPPED | OUTPATIENT
Start: 2019-04-15 | End: 2019-06-12 | Stop reason: SDUPTHER

## 2019-04-15 RX ORDER — CYCLOBENZAPRINE HCL 10 MG
TABLET ORAL
Qty: 60 TABLET | Refills: 0 | Status: SHIPPED | OUTPATIENT
Start: 2019-04-15 | End: 2019-08-21

## 2019-06-12 RX ORDER — MELOXICAM 15 MG/1
TABLET ORAL
Qty: 60 TABLET | Refills: 0 | Status: SHIPPED | OUTPATIENT
Start: 2019-06-12 | End: 2019-08-21 | Stop reason: CLARIF

## 2019-06-13 RX ORDER — ASPIRIN 81 MG/1
TABLET, COATED ORAL
Qty: 90 TABLET | Refills: 0 | Status: SHIPPED | OUTPATIENT
Start: 2019-06-13 | End: 2019-09-29 | Stop reason: SDUPTHER

## 2019-07-10 RX ORDER — CITALOPRAM 40 MG/1
40 TABLET ORAL DAILY
Qty: 90 TABLET | Refills: 0 | Status: SHIPPED | OUTPATIENT
Start: 2019-07-10 | End: 2019-08-21 | Stop reason: SDUPTHER

## 2019-07-23 ENCOUNTER — TRANSCRIBE ORDERS (OUTPATIENT)
Dept: ADMINISTRATIVE | Facility: HOSPITAL | Age: 64
End: 2019-07-23

## 2019-07-23 DIAGNOSIS — E04.1 THYROID NODULE: Primary | ICD-10-CM

## 2019-08-12 ENCOUNTER — HOSPITAL ENCOUNTER (OUTPATIENT)
Dept: ULTRASOUND IMAGING | Facility: HOSPITAL | Age: 64
Discharge: HOME | End: 2019-08-12
Admitting: OTOLARYNGOLOGY

## 2019-08-12 DIAGNOSIS — E04.1 THYROID NODULE: ICD-10-CM

## 2019-08-12 PROCEDURE — 76536 US EXAM OF HEAD AND NECK: CPT

## 2019-08-12 RX ORDER — MELOXICAM 15 MG/1
TABLET ORAL
Qty: 60 TABLET | Refills: 0 | OUTPATIENT
Start: 2019-08-12

## 2019-08-14 RX ORDER — MECLIZINE HYDROCHLORIDE 25 MG/1
12.5-25 TABLET ORAL 3 TIMES DAILY PRN
Qty: 30 TABLET | Refills: 0 | Status: SHIPPED | OUTPATIENT
Start: 2019-08-14 | End: 2020-02-11

## 2019-08-14 RX ORDER — TRAMADOL HYDROCHLORIDE 50 MG/1
50 TABLET ORAL EVERY 8 HOURS PRN
Qty: 21 TABLET | Refills: 0 | Status: SHIPPED | OUTPATIENT
Start: 2019-08-14 | End: 2019-08-21

## 2019-08-21 ENCOUNTER — OFFICE VISIT (OUTPATIENT)
Dept: FAMILY MEDICINE CLINIC | Facility: CLINIC | Age: 64
End: 2019-08-21

## 2019-08-21 VITALS
RESPIRATION RATE: 18 BRPM | DIASTOLIC BLOOD PRESSURE: 68 MMHG | OXYGEN SATURATION: 98 % | HEIGHT: 65 IN | SYSTOLIC BLOOD PRESSURE: 132 MMHG | WEIGHT: 257.9 LBS | HEART RATE: 93 BPM | TEMPERATURE: 98.6 F | BODY MASS INDEX: 42.97 KG/M2

## 2019-08-21 DIAGNOSIS — M25.571 CHRONIC PAIN OF RIGHT ANKLE: ICD-10-CM

## 2019-08-21 DIAGNOSIS — G44.229 CHRONIC TENSION-TYPE HEADACHE, NOT INTRACTABLE: ICD-10-CM

## 2019-08-21 DIAGNOSIS — G89.29 CHRONIC PAIN OF RIGHT ANKLE: ICD-10-CM

## 2019-08-21 DIAGNOSIS — G89.29 CHRONIC RIGHT-SIDED LOW BACK PAIN WITHOUT SCIATICA: ICD-10-CM

## 2019-08-21 DIAGNOSIS — R42 VERTIGO: ICD-10-CM

## 2019-08-21 DIAGNOSIS — R63.5 WEIGHT GAIN: ICD-10-CM

## 2019-08-21 DIAGNOSIS — M54.2 NECK PAIN: Primary | ICD-10-CM

## 2019-08-21 DIAGNOSIS — M54.50 CHRONIC RIGHT-SIDED LOW BACK PAIN WITHOUT SCIATICA: ICD-10-CM

## 2019-08-21 PROBLEM — S82.851D DISPLACED TRIMALLEOLAR FRACTURE OF RIGHT LOWER LEG, SUBSEQUENT ENCOUNTER FOR CLOSED FRACTURE WITH ROUTINE HEALING: Status: ACTIVE | Noted: 2017-01-12

## 2019-08-21 PROCEDURE — 99214 OFFICE O/P EST MOD 30 MIN: CPT | Performed by: FAMILY MEDICINE

## 2019-08-21 RX ORDER — DULOXETIN HYDROCHLORIDE 30 MG/1
30 CAPSULE, DELAYED RELEASE ORAL DAILY
Qty: 7 CAPSULE | Refills: 0 | Status: SHIPPED | OUTPATIENT
Start: 2019-08-21 | End: 2019-08-25 | Stop reason: SDUPTHER

## 2019-08-21 RX ORDER — HYDROCODONE BITARTRATE AND ACETAMINOPHEN 5; 325 MG/1; MG/1
1 TABLET ORAL EVERY 8 HOURS PRN
Qty: 60 TABLET | Refills: 0 | Status: SHIPPED | OUTPATIENT
Start: 2019-08-21 | End: 2020-02-11

## 2019-08-21 RX ORDER — DULOXETIN HYDROCHLORIDE 60 MG/1
60 CAPSULE, DELAYED RELEASE ORAL DAILY
Qty: 30 CAPSULE | Refills: 1 | Status: SHIPPED | OUTPATIENT
Start: 2019-08-21 | End: 2019-10-16 | Stop reason: SDUPTHER
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
## 2019-08-26 RX ORDER — DULOXETIN HYDROCHLORIDE 30 MG/1
30 CAPSULE, DELAYED RELEASE ORAL DAILY
Qty: 90 CAPSULE | Refills: 1 | Status: SHIPPED | OUTPATIENT
Start: 2019-08-26 | End: 2019-12-10 | Stop reason: CLARIF

## 2019-09-03 DIAGNOSIS — Z79.899 MEDICATION MANAGEMENT: ICD-10-CM

## 2019-09-03 DIAGNOSIS — E66.9 CLASS 2 OBESITY WITH BODY MASS INDEX (BMI) OF 39.0 TO 39.9 IN ADULT, UNSPECIFIED OBESITY TYPE, UNSPECIFIED WHETHER SERIOUS COMORBIDITY PRESENT: Primary | ICD-10-CM

## 2019-09-04 ENCOUNTER — LAB (OUTPATIENT)
Dept: FAMILY MEDICINE CLINIC | Facility: CLINIC | Age: 64
End: 2019-09-04

## 2019-09-04 DIAGNOSIS — Z79.899 MEDICATION MANAGEMENT: ICD-10-CM

## 2019-09-04 DIAGNOSIS — E66.9 CLASS 2 OBESITY WITH BODY MASS INDEX (BMI) OF 39.0 TO 39.9 IN ADULT, UNSPECIFIED OBESITY TYPE, UNSPECIFIED WHETHER SERIOUS COMORBIDITY PRESENT: ICD-10-CM

## 2019-09-04 LAB
ALBUMIN SERPL-MCNC: 4.5 G/DL (ref 3.5–5.2)
ALBUMIN/GLOB SERPL: 1.7 G/DL
ALP SERPL-CCNC: 105 U/L (ref 39–117)
ALT SERPL-CCNC: 28 U/L (ref 1–33)
AST SERPL-CCNC: 23 U/L (ref 1–32)
BASOPHILS # BLD AUTO: 0.06 10*3/MM3 (ref 0–0.2)
BASOPHILS NFR BLD AUTO: 0.8 % (ref 0–1.5)
BILIRUB SERPL-MCNC: 0.8 MG/DL (ref 0.2–1.2)
BUN SERPL-MCNC: 15 MG/DL (ref 8–23)
BUN/CREAT SERPL: 21.7 (ref 7–25)
CALCIUM SERPL-MCNC: 9.4 MG/DL (ref 8.6–10.5)
CHLORIDE SERPL-SCNC: 101 MMOL/L (ref 98–107)
CHOLEST SERPL-MCNC: 160 MG/DL (ref 0–200)
CO2 SERPL-SCNC: 27.5 MMOL/L (ref 22–29)
CREAT SERPL-MCNC: 0.69 MG/DL (ref 0.57–1)
EOSINOPHIL # BLD AUTO: 0.2 10*3/MM3 (ref 0–0.4)
EOSINOPHIL NFR BLD AUTO: 2.7 % (ref 0.3–6.2)
ERYTHROCYTE [DISTWIDTH] IN BLOOD BY AUTOMATED COUNT: 14 % (ref 12.3–15.4)
GLOBULIN SER CALC-MCNC: 2.7 GM/DL
GLUCOSE SERPL-MCNC: 90 MG/DL (ref 65–99)
HCT VFR BLD AUTO: 51.6 % (ref 34–46.6)
HDLC SERPL-MCNC: 48 MG/DL (ref 40–60)
HGB BLD-MCNC: 15.6 G/DL (ref 12–15.9)
IMM GRANULOCYTES # BLD AUTO: 0.01 10*3/MM3 (ref 0–0.05)
IMM GRANULOCYTES NFR BLD AUTO: 0.1 % (ref 0–0.5)
LDLC SERPL CALC-MCNC: 89 MG/DL (ref 0–100)
LYMPHOCYTES # BLD AUTO: 1.12 10*3/MM3 (ref 0.7–3.1)
LYMPHOCYTES NFR BLD AUTO: 15.1 % (ref 19.6–45.3)
MCH RBC QN AUTO: 29.1 PG (ref 26.6–33)
MCHC RBC AUTO-ENTMCNC: 30.2 G/DL (ref 31.5–35.7)
MCV RBC AUTO: 96.1 FL (ref 79–97)
MONOCYTES # BLD AUTO: 0.69 10*3/MM3 (ref 0.1–0.9)
MONOCYTES NFR BLD AUTO: 9.3 % (ref 5–12)
NEUTROPHILS # BLD AUTO: 5.32 10*3/MM3 (ref 1.7–7)
NEUTROPHILS NFR BLD AUTO: 72 % (ref 42.7–76)
NRBC BLD AUTO-RTO: 0 /100 WBC (ref 0–0.2)
PLATELET # BLD AUTO: 203 10*3/MM3 (ref 140–450)
POTASSIUM SERPL-SCNC: 4.5 MMOL/L (ref 3.5–5.2)
PROT SERPL-MCNC: 7.2 G/DL (ref 6–8.5)
RBC # BLD AUTO: 5.37 10*6/MM3 (ref 3.77–5.28)
SODIUM SERPL-SCNC: 141 MMOL/L (ref 136–145)
TRIGL SERPL-MCNC: 115 MG/DL (ref 0–150)
VLDLC SERPL CALC-MCNC: 23 MG/DL
WBC # BLD AUTO: 7.4 10*3/MM3 (ref 3.4–10.8)

## 2019-09-30 RX ORDER — ASPIRIN 81 MG/1
TABLET, COATED ORAL
Qty: 90 TABLET | Refills: 0 | Status: SHIPPED | OUTPATIENT
Start: 2019-09-30 | End: 2019-12-30

## 2019-10-16 RX ORDER — DULOXETIN HYDROCHLORIDE 60 MG/1
60 CAPSULE, DELAYED RELEASE ORAL DAILY
Qty: 90 CAPSULE | Refills: 1 | Status: SHIPPED | OUTPATIENT
Start: 2019-10-16 | End: 2020-04-08

## 2019-11-19 ENCOUNTER — OFFICE VISIT (OUTPATIENT)
Dept: FAMILY MEDICINE CLINIC | Facility: CLINIC | Age: 64
End: 2019-11-19

## 2019-11-19 ENCOUNTER — HOSPITAL ENCOUNTER (OUTPATIENT)
Dept: GENERAL RADIOLOGY | Facility: HOSPITAL | Age: 64
Discharge: HOME | End: 2019-11-19
Admitting: FAMILY MEDICINE

## 2019-11-19 VITALS
OXYGEN SATURATION: 98 % | TEMPERATURE: 98.5 F | BODY MASS INDEX: 42.32 KG/M2 | HEART RATE: 98 BPM | HEIGHT: 65 IN | WEIGHT: 254 LBS | SYSTOLIC BLOOD PRESSURE: 138 MMHG | RESPIRATION RATE: 16 BRPM | DIASTOLIC BLOOD PRESSURE: 72 MMHG

## 2019-11-19 DIAGNOSIS — M25.50 ARTHRALGIA, UNSPECIFIED JOINT: Primary | ICD-10-CM

## 2019-11-19 DIAGNOSIS — L74.9 SWEATING ABNORMALITY: ICD-10-CM

## 2019-11-19 DIAGNOSIS — M54.2 NECK PAIN: ICD-10-CM

## 2019-11-19 DIAGNOSIS — R20.0 NUMBNESS AND TINGLING OF RIGHT HAND: ICD-10-CM

## 2019-11-19 DIAGNOSIS — Z98.890 HISTORY OF NECK SURGERY: ICD-10-CM

## 2019-11-19 DIAGNOSIS — R20.2 NUMBNESS AND TINGLING OF RIGHT HAND: ICD-10-CM

## 2019-11-19 DIAGNOSIS — R79.82 ELEVATED C-REACTIVE PROTEIN (CRP): ICD-10-CM

## 2019-11-19 PROCEDURE — 72040 X-RAY EXAM NECK SPINE 2-3 VW: CPT

## 2019-11-19 PROCEDURE — 99214 OFFICE O/P EST MOD 30 MIN: CPT | Performed by: FAMILY MEDICINE
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
## 2019-11-20 LAB
ANA SER QL: NEGATIVE
CRP SERPL-MCNC: 1.03 MG/DL (ref 0–0.5)
ERYTHROCYTE [SEDIMENTATION RATE] IN BLOOD BY WESTERGREN METHOD: 7 MM/HR (ref 0–30)

## 2019-11-21 DIAGNOSIS — R20.0 NUMBNESS AND TINGLING IN RIGHT HAND: ICD-10-CM

## 2019-11-21 DIAGNOSIS — Z98.890 HISTORY OF NECK SURGERY: ICD-10-CM

## 2019-11-21 DIAGNOSIS — M54.2 NECK PAIN: Primary | ICD-10-CM

## 2019-11-21 DIAGNOSIS — R20.2 NUMBNESS AND TINGLING IN RIGHT HAND: ICD-10-CM

## 2019-12-09 ENCOUNTER — HOSPITAL ENCOUNTER (OUTPATIENT)
Dept: MRI IMAGING | Facility: HOSPITAL | Age: 64
Discharge: HOME | End: 2019-12-09
Admitting: FAMILY MEDICINE

## 2019-12-09 DIAGNOSIS — M54.2 NECK PAIN: ICD-10-CM

## 2019-12-09 DIAGNOSIS — R20.2 NUMBNESS AND TINGLING IN RIGHT HAND: ICD-10-CM

## 2019-12-09 DIAGNOSIS — R20.0 NUMBNESS AND TINGLING IN RIGHT HAND: ICD-10-CM

## 2019-12-09 DIAGNOSIS — Z98.890 HISTORY OF NECK SURGERY: ICD-10-CM

## 2019-12-09 PROCEDURE — 72141 MRI NECK SPINE W/O DYE: CPT

## 2019-12-10 ENCOUNTER — OFFICE VISIT (OUTPATIENT)
Dept: FAMILY MEDICINE CLINIC | Facility: CLINIC | Age: 64
End: 2019-12-10

## 2019-12-10 VITALS
HEIGHT: 65 IN | RESPIRATION RATE: 20 BRPM | BODY MASS INDEX: 42.82 KG/M2 | TEMPERATURE: 98.2 F | SYSTOLIC BLOOD PRESSURE: 134 MMHG | DIASTOLIC BLOOD PRESSURE: 64 MMHG | WEIGHT: 257 LBS | OXYGEN SATURATION: 98 % | HEART RATE: 91 BPM

## 2019-12-10 DIAGNOSIS — M50.30 DDD (DEGENERATIVE DISC DISEASE), CERVICAL: Primary | ICD-10-CM

## 2019-12-10 DIAGNOSIS — J40 BRONCHITIS: Primary | ICD-10-CM

## 2019-12-10 DIAGNOSIS — M54.12 CERVICAL RADICULOPATHY: ICD-10-CM

## 2019-12-10 DIAGNOSIS — M25.50 ARTHRALGIA, UNSPECIFIED JOINT: ICD-10-CM

## 2019-12-10 PROCEDURE — 99214 OFFICE O/P EST MOD 30 MIN: CPT | Performed by: NURSE PRACTITIONER

## 2019-12-10 PROCEDURE — 96372 THER/PROPH/DIAG INJ SC/IM: CPT | Performed by: NURSE PRACTITIONER

## 2019-12-10 PROCEDURE — 94640 AIRWAY INHALATION TREATMENT: CPT | Performed by: NURSE PRACTITIONER

## 2019-12-10 RX ORDER — DEXTROMETHORPHAN HYDROBROMIDE AND PROMETHAZINE HYDROCHLORIDE 15; 6.25 MG/5ML; MG/5ML
5 SYRUP ORAL 4 TIMES DAILY PRN
Qty: 118 ML | Refills: 0 | Status: SHIPPED | OUTPATIENT
Start: 2019-12-10 | End: 2020-02-11

## 2019-12-10 RX ORDER — AZITHROMYCIN 250 MG/1
TABLET, FILM COATED ORAL
Qty: 6 TABLET | Refills: 0 | Status: SHIPPED | OUTPATIENT
Start: 2019-12-10 | End: 2020-02-11

## 2019-12-10 RX ORDER — IPRATROPIUM BROMIDE AND ALBUTEROL SULFATE 2.5; .5 MG/3ML; MG/3ML
3 SOLUTION RESPIRATORY (INHALATION) ONCE
Status: COMPLETED | OUTPATIENT
Start: 2019-12-10 | End: 2019-12-10

## 2019-12-10 RX ORDER — PREDNISONE 20 MG/1
40 TABLET ORAL DAILY
Qty: 10 TABLET | Refills: 0 | Status: SHIPPED | OUTPATIENT
Start: 2019-12-10 | End: 2019-12-15

## 2019-12-10 RX ORDER — TRIAMCINOLONE ACETONIDE 40 MG/ML
40 INJECTION, SUSPENSION INTRA-ARTICULAR; INTRAMUSCULAR ONCE
Status: COMPLETED | OUTPATIENT
Start: 2019-12-10 | End: 2019-12-10

## 2019-12-10 RX ADMIN — TRIAMCINOLONE ACETONIDE 40 MG: 40 INJECTION, SUSPENSION INTRA-ARTICULAR; INTRAMUSCULAR at 12:41

## 2019-12-10 RX ADMIN — IPRATROPIUM BROMIDE AND ALBUTEROL SULFATE 3 ML: 2.5; .5 SOLUTION RESPIRATORY (INHALATION) at 12:07

## 2019-12-10 NOTE — PROGRESS NOTES
Spoke with pt. Went through level by level of the C spine MRI with description of findings. I think based on the findings and the eccentric central stenosis at multiple levels as well as the foraminal narrowing on the right that is severe and her RUE symptoms she should be seen by [facility]. I will put in referral.     Also will refer to rheum for her chronic worsening joint pain, multiple bilateral joints.

## 2019-12-10 NOTE — PROGRESS NOTES
"Norma Fierro is a 64 y.o. female.     Chief Complaint   Patient presents with   • Cough     x 1 week   • URI      HPI    New patient to me.  Here for appr 1 week of worsening chest congestion.  Started as a cold and has moved into her chest and she cannot get over it.  Worsening over the last day or so.  Gets this twice per yr and usually cannot get over it without abx. Unsure of sick contacts.  Felt feverish but denies fever. Has had some chills.  Feels terrible.  Cough is during both day and night but is keeping her up at night. Is coughing up quite a bit of yellow-green phlegm.     Social History     Tobacco Use   • Smoking status: Never Smoker   • Smokeless tobacco: Never Used   Substance Use Topics   • Alcohol use: No     Comment: one drink once per month   • Drug use: No       The following portions of the patient's history were reviewed and updated as appropriate: allergies, current medications, past family history, past medical history, past social history, past surgical history and problem list.    Review of Systems   Constitutional: Positive for activity change, appetite change, chills and fatigue.   HENT: Positive for congestion, postnasal drip and sore throat (feels raw). Negative for ear discharge, ear pain (feel full), sinus pressure and sinus pain.    Respiratory: Positive for cough and chest tightness. Negative for shortness of breath and wheezing.    Cardiovascular: Negative for chest pain and palpitations.   Gastrointestinal: Negative for abdominal pain, diarrhea, nausea and vomiting.   Musculoskeletal: Positive for arthralgias (chronic) and myalgias (chronic).   Neurological: Negative for weakness and headaches.       Objective   Blood pressure 134/64, pulse 91, temperature 98.2 °F (36.8 °C), temperature source Oral, resp. rate 20, height 163.8 cm (64.5\"), weight 117 kg (257 lb), SpO2 98 %, not currently breastfeeding.  Body mass index is 43.43 kg/m².    Physical Exam "   Constitutional: She is oriented to person, place, and time. She appears well-developed and well-nourished. No distress.   Non toxic but is ill appearing   HENT:   Head: Normocephalic and atraumatic.   Right Ear: External ear and ear canal normal. Tympanic membrane is not erythematous. A middle ear effusion is present.   Left Ear: External ear and ear canal normal. Tympanic membrane is not erythematous. A middle ear effusion is present.   Nose: Mucosal edema present. Right sinus exhibits no maxillary sinus tenderness and no frontal sinus tenderness. Left sinus exhibits no maxillary sinus tenderness and no frontal sinus tenderness.   Mouth/Throat: Posterior oropharyngeal erythema present.   Eyes: Conjunctivae are normal. Right eye exhibits no discharge. Left eye exhibits no discharge.   Neck: Neck supple.   Cardiovascular: Normal rate and regular rhythm.   Pulmonary/Chest: Effort normal. She has wheezes.   Scattered wheeze and rhonchi with were a little improved with MN   Abdominal: Soft. Bowel sounds are normal. There is no tenderness.   Musculoskeletal: She exhibits no deformity.   Gait smooth and steady   Lymphadenopathy:     She has no cervical adenopathy.   Neurological: She is alert and oriented to person, place, and time.   Skin: Skin is warm and dry. She is not diaphoretic.   Psychiatric: She has a normal mood and affect.   Nursing note and vitals reviewed.      Assessment   Problem List Items Addressed This Visit     None      Visit Diagnoses     Bronchitis    -  Primary    Relevant Medications    ipratropium-albuterol (DUO-NEB) nebulizer solution 3 mL (Completed)    azithromycin (ZITHROMAX Z-AARON) 250 MG tablet    predniSONE (DELTASONE) 20 MG tablet    triamcinolone acetonide (KENALOG-40) injection 40 mg (Completed)    promethazine-dextromethorphan (PROMETHAZINE-DM) 6.25-15 MG/5ML syrup           Procedures           Impression and Plan:  Reviewed her chart and has not been on abx frequently and seems to  have done well in the past with azithromax with similar sx.  Will give steroid injection now as she is returning to work and I dont want her to begin po steroids too late in the day.  She will start azithromax today and then po steroids tomorrow.  Promethazine-DM for cough.  She will let me know if she is not improving or worsening.  May need CXR and high dose amox added or switch to augmentin if worsening.  Patient has been instructed to complete all antibiotics, even if feeling better. Call with any problems taking them.   Signs and symptoms of complications from bronchitis reviewed.  Normal course and expected resolution reviewed.  We discussed s/s requiring emergent tx.       Health Maintenance Due   Topic Date Due   • ANNUAL PHYSICAL  09/[birth year]/1959   • TDAP/TD VACCINES (1 - Tdap) 09/12/1967   • ZOSTER VACCINE (1 of 2) 09/12/2006   • PAP SMEAR  10/25/2017   • MAMMOGRAM  10/25/2019              EMR Dragon/Transcription disclaimer:   Much of this encounter note is an electronic transcription/translation of spoken language to printed text. The electronic translation of spoken language may permit erroneous, or at times, nonsensical words or phrases to be inadvertently transcribed; Although I have reviewed the note for such errors, some may still exist.

## 2019-12-30 RX ORDER — ASPIRIN 81 MG/1
TABLET, COATED ORAL
Qty: 90 TABLET | Refills: 0 | Status: SHIPPED | OUTPATIENT
Start: 2019-12-30 | End: 2020-04-16

## 2020-02-07 NOTE — PROGRESS NOTES
Subjective   Patient ID: Jannet Fierro is a 64 y.o. female is being seen for consultation today at the request of Sandra Roe MD for neck pain and right arm pain with tingling in her right arm. She also has back and hip pain. She has seen a rheumatologist and she has osteoarthritis. She was given Relafen 750 mg. Her entire body hurts.    History of Present Illness    This patient has been having pain in her neck with primarily radiation into her right arm but some in her left as well.  She cannot raise her right arm above her shoulder.  Pain is sharp and stabbing and quite severe.  She has no difficulty with bowel bladder control.  She has no other associated symptoms.  She also has pain in her lower back with radiation into both of her legs.  She has been treated with medications but no other treatment so far.  Kind of activity makes the pain worse.    The following portions of the patient's history were reviewed and updated as appropriate: allergies, current medications, past family history, past medical history, past social history, past surgical history and problem list.    Review of Systems   Constitutional: Positive for activity change.   Respiratory: Negative for chest tightness and shortness of breath.    Cardiovascular: Negative for chest pain.   Gastrointestinal: Negative for abdominal pain.   Genitourinary: Negative for dysuria.   Musculoskeletal: Positive for back pain, myalgias and neck pain.        Bilateral upper and lower extremity pain   Neurological: Positive for headaches. Negative for weakness.       Objective   Physical Exam   Constitutional: She is oriented to person, place, and time. She appears well-developed and well-nourished.   HENT:   Head: Normocephalic and atraumatic.   Eyes: Pupils are equal, round, and reactive to light. Conjunctivae and EOM are normal.   Fundoscopic exam:       The right eye shows no papilledema. The right eye shows venous pulsations.        The left eye shows no  papilledema. The left eye shows venous pulsations.   Neck: Carotid bruit is not present.   Neurological: She is oriented to person, place, and time. She has a normal Finger-Nose-Finger Test and a normal Heel to Shin Test. Gait normal.   Reflex Scores:       Tricep reflexes are 2+ on the right side and 2+ on the left side.       Bicep reflexes are 2+ on the right side and 2+ on the left side.       Brachioradialis reflexes are 2+ on the right side and 2+ on the left side.       Patellar reflexes are 2+ on the right side and 2+ on the left side.       Achilles reflexes are 2+ on the right side and 2+ on the left side.  Psychiatric: Her speech is normal.     Neurologic Exam     Mental Status   Oriented to person, place, and time.   Registration of memory: Good recent and remote memory.   Attention: normal. Concentration: normal.   Speech: speech is normal   Level of consciousness: alert  Knowledge: consistent with education.     Cranial Nerves     CN II   Visual fields full to confrontation.   Visual acuity: normal    CN III, IV, VI   Pupils are equal, round, and reactive to light.  Extraocular motions are normal.     CN V   Facial sensation intact.   Right corneal reflex: normal  Left corneal reflex: normal    CN VII   Facial expression full, symmetric.   Right facial weakness: none  Left facial weakness: none    CN VIII   Hearing: intact    CN IX, X   Palate: symmetric    CN XI   Right sternocleidomastoid strength: normal  Left sternocleidomastoid strength: normal    CN XII   Tongue: not atrophic  Tongue deviation: none    Motor Exam   Muscle bulk: normal  Right arm tone: normal  Left arm tone: normal  Right leg tone: normal  Left leg tone: normal    Strength   Strength 5/5 except as noted.     Sensory Exam   Light touch normal.     Gait, Coordination, and Reflexes     Gait  Gait: normal    Coordination   Finger to nose coordination: normal  Heel to shin coordination: normal    Reflexes   Right brachioradialis:  2+  Left brachioradialis: 2+  Right biceps: 2+  Left biceps: 2+  Right triceps: 2+  Left triceps: 2+  Right patellar: 2+  Left patellar: 2+  Right achilles: 2+  Left achilles: 2+  Right : 2+  Left : 2+      Assessment/Plan   Independent Review of Radiographic Studies:      Uterine MRI.  Show some disc bulging at several levels of the cervical spine.  On the axial images C2-3 is widely open.  C3-4 is also widely open.  C4-5 shows some disc bulging laterally into the neuroforamina on both sides.  C5-6 shows some left-sided foraminal stenosis but not severe.  C6-7 shows some disc bulging and may be a little right-sided foraminal stenosis.  C7-T1 looks okay.    Medical Decision Making:      I told the patient about the imaging.  I told her we do not have any imaging of her lumbar spine and I think we need better pictures of her cervical spine.  She does have some foraminal narrowing at C4-5 and some questionable weakness of her right deltoid.  Therefore I think we need to look at this a bit closer and I recommended a lumbar and cervical myelogram.  I told the patient what a myelogram involves.  I explained that there is a 50% chance of developing a bad headache and nausea as a result of the test.  I explained that there is also a very small chance of infection, seizures, and bleeding.  I explained how we would treat a post myelogram headache including bedrest, caffeinated fluids, steroids, and blood patch.  The patient does ask to proceed.    Jannet was seen today for neck pain and back pain.    Diagnoses and all orders for this visit:    Chronic midline low back pain with bilateral sciatica  -     IR Myelogram 2 or More Areas; Future  -     CT Lumbar Spine With Intrathecal Contrast; Future  -     XR Spine Lumbar Complete With Flex & Ext; Future  -     dexamethasone (DECADRON) 4 MG tablet; Take 2 tablets by mouth Take As Directed. Take both tablets by mouth 2 hours before myelogram    Cervical radiculitis  -      IR Myelogram 2 or More Areas; Future  -     CT Cervical Spine With Intrathecal Contrast; Future  -     XR Spine Cervical Complete With Flex Ext; Future  -     dexamethasone (DECADRON) 4 MG tablet; Take 2 tablets by mouth Take As Directed. Take both tablets by mouth 2 hours before myelogram      Return for After radiology test.

## 2020-02-11 ENCOUNTER — OFFICE VISIT (OUTPATIENT)
Dept: NEUROSURGERY | Facility: CLINIC | Age: 65
End: 2020-02-11

## 2020-02-11 VITALS
HEIGHT: 65 IN | HEART RATE: 105 BPM | BODY MASS INDEX: 42.82 KG/M2 | DIASTOLIC BLOOD PRESSURE: 69 MMHG | SYSTOLIC BLOOD PRESSURE: 116 MMHG | WEIGHT: 257 LBS

## 2020-02-11 DIAGNOSIS — G89.29 CHRONIC MIDLINE LOW BACK PAIN WITH BILATERAL SCIATICA: Primary | ICD-10-CM

## 2020-02-11 DIAGNOSIS — M54.41 CHRONIC MIDLINE LOW BACK PAIN WITH BILATERAL SCIATICA: Primary | ICD-10-CM

## 2020-02-11 DIAGNOSIS — M54.12 CERVICAL RADICULITIS: ICD-10-CM

## 2020-02-11 DIAGNOSIS — M54.42 CHRONIC MIDLINE LOW BACK PAIN WITH BILATERAL SCIATICA: Primary | ICD-10-CM

## 2020-02-11 PROCEDURE — 99243 OFF/OP CNSLTJ NEW/EST LOW 30: CPT | Performed by: NEUROLOGICAL SURGERY

## 2020-02-11 RX ORDER — NABUMETONE 750 MG/1
750 TABLET, FILM COATED ORAL 2 TIMES DAILY
COMMUNITY
Start: 2020-01-30 | End: 2020-03-26 | Stop reason: HOSPADM

## 2020-02-11 RX ORDER — DEXAMETHASONE 4 MG/1
8 TABLET ORAL TAKE AS DIRECTED
Qty: 2 TABLET | Refills: 0 | Status: SHIPPED | OUTPATIENT
Start: 2020-02-11 | End: 2020-03-05 | Stop reason: HOSPADM

## 2020-03-05 ENCOUNTER — HOSPITAL ENCOUNTER (OUTPATIENT)
Dept: CT IMAGING | Facility: HOSPITAL | Age: 65
Discharge: HOME | End: 2020-03-05
Admitting: NEUROLOGICAL SURGERY

## 2020-03-05 ENCOUNTER — HOSPITAL ENCOUNTER (OUTPATIENT)
Dept: GENERAL RADIOLOGY | Facility: HOSPITAL | Age: 65
Discharge: HOME | End: 2020-03-05

## 2020-03-05 VITALS
SYSTOLIC BLOOD PRESSURE: 133 MMHG | TEMPERATURE: 97.4 F | HEART RATE: 93 BPM | BODY MASS INDEX: 42.68 KG/M2 | OXYGEN SATURATION: 98 % | RESPIRATION RATE: 18 BRPM | WEIGHT: 250 LBS | HEIGHT: 64 IN | DIASTOLIC BLOOD PRESSURE: 75 MMHG

## 2020-03-05 DIAGNOSIS — M54.41 CHRONIC MIDLINE LOW BACK PAIN WITH BILATERAL SCIATICA: ICD-10-CM

## 2020-03-05 DIAGNOSIS — G89.29 CHRONIC MIDLINE LOW BACK PAIN WITH BILATERAL SCIATICA: ICD-10-CM

## 2020-03-05 DIAGNOSIS — M54.12 CERVICAL RADICULITIS: ICD-10-CM

## 2020-03-05 DIAGNOSIS — M54.42 CHRONIC MIDLINE LOW BACK PAIN WITH BILATERAL SCIATICA: ICD-10-CM

## 2020-03-05 PROCEDURE — 72114 X-RAY EXAM L-S SPINE BENDING: CPT

## 2020-03-05 PROCEDURE — 72052 X-RAY EXAM NECK SPINE 6/>VWS: CPT

## 2020-03-05 PROCEDURE — 62305 MYELOGRAPHY LUMBAR INJECTION: CPT

## 2020-03-05 PROCEDURE — 25010000003 LIDOCAINE 1 % SOLUTION: Performed by: NEUROLOGICAL SURGERY

## 2020-03-05 PROCEDURE — 72132 CT LUMBAR SPINE W/DYE: CPT

## 2020-03-05 PROCEDURE — 25010000002 IOPAMIDOL 61 % SOLUTION: Performed by: NEUROLOGICAL SURGERY

## 2020-03-05 PROCEDURE — 72240 MYELOGRAPHY NECK SPINE: CPT

## 2020-03-05 PROCEDURE — 72126 CT NECK SPINE W/DYE: CPT

## 2020-03-05 RX ORDER — ACETAMINOPHEN 325 MG/1
650 TABLET ORAL EVERY 4 HOURS PRN
Status: DISCONTINUED | OUTPATIENT
Start: 2020-03-05 | End: 2020-03-06 | Stop reason: HOSPADM

## 2020-03-05 RX ORDER — LIDOCAINE HYDROCHLORIDE 10 MG/ML
10 INJECTION, SOLUTION INFILTRATION; PERINEURAL ONCE
Status: COMPLETED | OUTPATIENT
Start: 2020-03-05 | End: 2020-03-05

## 2020-03-05 RX ORDER — HYDROCODONE BITARTRATE AND ACETAMINOPHEN 5; 325 MG/1; MG/1
1 TABLET ORAL EVERY 4 HOURS PRN
Status: DISCONTINUED | OUTPATIENT
Start: 2020-03-05 | End: 2020-03-06 | Stop reason: HOSPADM

## 2020-03-05 RX ADMIN — LIDOCAINE HYDROCHLORIDE 3 ML: 10 INJECTION, SOLUTION INFILTRATION; PERINEURAL at 07:32

## 2020-03-05 RX ADMIN — LIDOCAINE HYDROCHLORIDE 3 ML: 10 INJECTION, SOLUTION INFILTRATION; PERINEURAL at 07:35

## 2020-03-05 RX ADMIN — IOPAMIDOL 15 ML: 612 INJECTION, SOLUTION INTRATHECAL at 07:38

## 2020-03-05 NOTE — DISCHARGE INSTRUCTIONS
EDUCATION /DISCHARGE INSTRUCTIONS:    A myelogram is a special radiology procedure of the spinal cord, spinal nerves and other related structures.  You will be awake during the examination.  An area of your lower back will be cleansed with an antiseptic solution.  The physician will inject a numbing medication in your lower back.  While your back is numb, a needle will be placed in the lower back area.  A small amount of spinal fluid may be withdrawn and sent to the lab if ordered by your physician. While the needle is in the back, an injection of a contrast material (xray dye) will be given through the needle.  The contrast material will allow the physician to see the spinal cord and spinal nerves.  Once injected, the needle will be removed and a band aid will be placed over the injection site.  The table will be tilted during the process to allow the contrast material to flow to particular areas in the spine.  Following the injection and xrays, you will be taken to the CT scan where more pictures will be taken. After the procedure is finished, the contrast material will be absorbed by your body and eliminated through your kidneys.  The radiologist will study and interpret your myelogram and send the results to your physician.  Procedure risks of a myelogram include, but are not limited to:  *  Bleeding   *  seizure  *  Infection   *  Headache, possibly severe requiring  *  Contrast reaction      a blood patch  *  Nerve or cord injury  *  Paralysis and death  Benefits of the procedure:  *  Best examination for delineating pathology related to spinal cord compression from a    disc and/or nerve root compression  Alternatives to the procedure:  MRI - a non invasive procedure requiring intravenous contrast injection.  Cannot be done on patients with certain pacemakers or metal in the body.  MRI risks include possible reaction to the contrast material, movement of metal located in the body.Benefit to MRI:  Non-invasive  and usually painless procedure.  THIS EDUCATION INFORMATION WAS REVIEWED PRIOR TO PROCEDURE AND CONSENT. Patient initials________________Time__0659________________    24 hour rest period ends _1100___________________.  Important information following your myelogram:  * ACTIVITY:   *  Lie down with your head elevated no more than 2 pillows high today & tonight  *  Sit up to eat your meals and use the restroom, otherwise, lie down.  *  Remain less active for two to three days.  *  Do not drive for 48 hours following a myelogram  *  You may remove the bandage and shower in the morning  *  Increase your fluids for the next 24 hours.  Caffeinated drinks are encouraged.  Resume taking blood thinner or aspirin on _3/6/2020 after 1100____    CALL YOUR PHYSICIAN FOR THE FOLLOWING:  * Pain at the injection site  * Reddness, swelling, bruising or drainage at the injection site.  * A fever by mouth of 101.0 or any new symptoms  Headaches are a common side effect after a myelogram.  If you get a headache, you should stay flat in bed and drink plenty of fluids. If the headache persist and does not go away with rest/medication, CALL Dr. Thomas at [phone]

## 2020-03-06 ENCOUNTER — TELEPHONE (OUTPATIENT)
Dept: INTERVENTIONAL RADIOLOGY/VASCULAR | Facility: HOSPITAL | Age: 65
End: 2020-03-06

## 2020-03-16 ENCOUNTER — TELEPHONE (OUTPATIENT)
Dept: NEUROSURGERY | Facility: CLINIC | Age: 65
End: 2020-03-16

## 2020-03-16 NOTE — TELEPHONE ENCOUNTER
This patient called the office and asked that I give her the results over the phone.  The plain films of the cervical spine show multilevel degenerative disease but no evidence of instability on flexion and extension.  The lumbar films also show multilevel degenerative disease but again no evidence of instability.  I reviewed her plain film myelogram which shows a compression fracture of L1 with some retropulsion in the canal but no severe stenosis.  There is no evidence of nerve root compression elsewhere in the lumbar spine even on standing films.  The visualization in the cervical spine on the myelogram was poor.  On the post myelographic CT scan of the cervical spine C2-3 looks okay.  C3-4 shows some spondylitic changes with fairly patent foramina.  C4-5 shows a fairly large spur compressing the nerve on the right side.  C5-6 shows severe left-sided foraminal stenosis with a little bit of cord flattening.  C6-7 shows some spondylitic changes and a little foraminal stenosis on the right but not severe and C7-T1 looks okay.  On the post myelographic CT scan of the lumbar spine there is a little lateral recess stenosis at L2-3.  L3-4 also shows a little lateral recess stenosis.  L4-5 and L5-S1 mostly look okay.  The radiologist thought there could be a little bit of hemorrhage in the thecal sac as well.    I talked to the patient on the phone.  I explained that there is no surgery I can propose for her lower back but I could consider a two-level anterior cervical discectomy in her cervical spine especially since she has weakness.  I recommended that she go ahead and come to the office tomorrow to discuss this.

## 2020-03-17 ENCOUNTER — OFFICE VISIT (OUTPATIENT)
Dept: NEUROSURGERY | Facility: CLINIC | Age: 65
End: 2020-03-17

## 2020-03-17 ENCOUNTER — PREP FOR SURGERY (OUTPATIENT)
Dept: OTHER | Facility: HOSPITAL | Age: 65
End: 2020-03-17

## 2020-03-17 VITALS — HEART RATE: 104 BPM | TEMPERATURE: 97.4 F | DIASTOLIC BLOOD PRESSURE: 86 MMHG | SYSTOLIC BLOOD PRESSURE: 142 MMHG

## 2020-03-17 DIAGNOSIS — M54.12 CERVICAL RADICULITIS: Primary | ICD-10-CM

## 2020-03-17 PROCEDURE — 99213 OFFICE O/P EST LOW 20 MIN: CPT | Performed by: NEUROLOGICAL SURGERY

## 2020-03-17 RX ORDER — CEFAZOLIN SODIUM 2 G/100ML
2 INJECTION, SOLUTION INTRAVENOUS ONCE
Status: CANCELLED | OUTPATIENT
Start: 2020-03-23 | End: 2020-03-17

## 2020-03-17 NOTE — PROGRESS NOTES
Subjective   Patient ID: Jannet Fierro is a 64 y.o. female is here today for follow-up with a new Cervical and Lumbar Myelogram that was ordered at her last office for neck and right arm pain with tingling in her right arm. She also has back and hip pain.  Today her symptoms are unchanged form her previous visit.     History of Present Illness    This patient continues with pain in her neck with radiation into her right arm and her right shoulder.  She also has pain in her back with radiation into her hip.  She still has a lot of weakness in her right shoulder with an inability to raise her arm above her shoulder level.    The following portions of the patient's history were reviewed and updated as appropriate: allergies, current medications, past family history, past medical history, past social history, past surgical history and problem list.    Review of Systems   Respiratory: Negative for chest tightness and shortness of breath.    Cardiovascular: Negative for chest pain.   Musculoskeletal: Positive for arthralgias (Hip pain), back pain and neck pain.        Bilateral upper and lower extremity pain   Neurological: Positive for headaches.        Positive for tingling       Objective   Physical Exam   Constitutional: She is oriented to person, place, and time. She appears well-developed and well-nourished.   Eyes: Pupils are equal, round, and reactive to light. EOM are normal.   Neurological: She is oriented to person, place, and time. She has a normal Finger-Nose-Finger Test and a normal Heel to Shin Test. Gait normal.   Reflex Scores:       Tricep reflexes are 2+ on the right side and 2+ on the left side.       Bicep reflexes are 2+ on the right side and 2+ on the left side.       Brachioradialis reflexes are 2+ on the right side and 2+ on the left side.       Patellar reflexes are 2+ on the right side and 2+ on the left side.       Achilles reflexes are 2+ on the right side and 2+ on the left  side.  Psychiatric: Her speech is normal.     Neurologic Exam     Mental Status   Oriented to person, place, and time.   Registration of memory: Good recent and remote memory.   Attention: normal. Concentration: normal.   Speech: speech is normal   Level of consciousness: alert  Knowledge: consistent with education.     Cranial Nerves     CN II   Visual fields full to confrontation.   Visual acuity: normal    CN III, IV, VI   Pupils are equal, round, and reactive to light.  Extraocular motions are normal.     CN V   Facial sensation intact.   Right corneal reflex: normal  Left corneal reflex: normal    CN VII   Facial expression full, symmetric.   Right facial weakness: none  Left facial weakness: none    CN VIII   Hearing: intact    CN IX, X   Palate: symmetric    CN XI   Right sternocleidomastoid strength: normal  Left sternocleidomastoid strength: normal    CN XII   Tongue: not atrophic  Tongue deviation: none    Motor Exam   Muscle bulk: normal  Right arm tone: normal  Left arm tone: normal  Right leg tone: normal  Left leg tone: normal    Strength   Strength 5/5 except as noted.     Sensory Exam   Light touch normal.     Gait, Coordination, and Reflexes     Gait  Gait: normal    Coordination   Finger to nose coordination: normal  Heel to shin coordination: normal    Reflexes   Right brachioradialis: 2+  Left brachioradialis: 2+  Right biceps: 2+  Left biceps: 2+  Right triceps: 2+  Left triceps: 2+  Right patellar: 2+  Left patellar: 2+  Right achilles: 2+  Left achilles: 2+  Right : 2+  Left : 2+      Assessment/Plan   Independent Review of Radiographic Studies:      This patient called the office and asked that I give her the results over the phone.  The plain films of the cervical spine show multilevel degenerative disease but no evidence of instability on flexion and extension.  The lumbar films also show multilevel degenerative disease but again no evidence of instability.  I reviewed her plain  film myelogram which shows a compression fracture of L1 with some retropulsion in the canal but no severe stenosis.  There is no evidence of nerve root compression elsewhere in the lumbar spine even on standing films.  The visualization in the cervical spine on the myelogram was poor.  On the post myelographic CT scan of the cervical spine C2-3 looks okay.  C3-4 shows some spondylitic changes with fairly patent foramina.  C4-5 shows a fairly large spur compressing the nerve on the right side.  C5-6 shows severe left-sided foraminal stenosis with a little bit of cord flattening.  C6-7 shows some spondylitic changes and a little foraminal stenosis on the right but not severe and C7-T1 looks okay.  On the post myelographic CT scan of the lumbar spine there is a little lateral recess stenosis at L2-3.  L3-4 also shows a little lateral recess stenosis.  L4-5 and L5-S1 mostly look okay.  The radiologist thought there could be a little bit of hemorrhage in the thecal sac as well.    Medical Decision Making:      I told the patient about the imaging.  I told her I did not see anything in the lumbar spine that I would recommend surgery for.  I told her that with her pain being mostly in her right neck and right shoulder with an inability to rise her arm above her shoulder that this is a neurologic deficit it is certainly is reasonable to consider surgery.  I told her that we would consider doing C4-5 and C5-6.  I told the patient about the surgery which is called an anterior cervical discectomy.  I explained that there is an 80% chance of getting rid of the arm pain and any other arm symptoms.  I also explained that the patient would still have neck pain.  Initially this will be quite severe however it will improve with healing from the surgery.  There is also a risk of infection, bleeding, paralysis, and anesthetic risk.  There is a risk of damage to the soft tissues of the neck such as the esophagus, carotids, and trachea.   All of these risks are about 2 or 3%.  There is about a 5% chance of nonunion or failure of the instrumentation.  There is also a about a 5% chance of hoarseness and trouble swallowing do to damage to the recurrent laryngeal nerve.  We discussed the postoperative hospital and home course as well.  The patient does ask to proceed.    She will need to be scheduled for a: Cervical 4 to cervical 5 and cervical 5 to cervical 6 anterior cervical discectomy, fusion and instrumentation    Jannet was seen today for follow-up.    Diagnoses and all orders for this visit:    Cervical radiculitis      Return for 2-3 week post op.

## 2020-03-19 ENCOUNTER — APPOINTMENT (OUTPATIENT)
Dept: PREADMISSION TESTING | Facility: HOSPITAL | Age: 65
End: 2020-03-19

## 2020-03-19 VITALS
BODY MASS INDEX: 44.05 KG/M2 | HEIGHT: 64 IN | SYSTOLIC BLOOD PRESSURE: 126 MMHG | OXYGEN SATURATION: 95 % | RESPIRATION RATE: 20 BRPM | WEIGHT: 258 LBS | TEMPERATURE: 98.4 F | HEART RATE: 100 BPM | DIASTOLIC BLOOD PRESSURE: 73 MMHG

## 2020-03-19 LAB
ANION GAP SERPL CALCULATED.3IONS-SCNC: 14.1 MMOL/L (ref 5–15)
BUN BLD-MCNC: 11 MG/DL (ref 8–23)
BUN/CREAT SERPL: 15.3 (ref 7–25)
CALCIUM SPEC-SCNC: 9.1 MG/DL (ref 8.6–10.5)
CHLORIDE SERPL-SCNC: 103 MMOL/L (ref 98–107)
CO2 SERPL-SCNC: 23.9 MMOL/L (ref 22–29)
CREAT BLD-MCNC: 0.72 MG/DL (ref 0.57–1)
DEPRECATED RDW RBC AUTO: 42.7 FL (ref 37–54)
ERYTHROCYTE [DISTWIDTH] IN BLOOD BY AUTOMATED COUNT: 13.1 % (ref 12.3–15.4)
GFR SERPL CREATININE-BSD FRML MDRD: 82 ML/MIN/1.73
GLUCOSE BLD-MCNC: 84 MG/DL (ref 65–99)
HCT VFR BLD AUTO: 45.6 % (ref 34–46.6)
HGB BLD-MCNC: 15.3 G/DL (ref 12–15.9)
MCH RBC QN AUTO: 29.7 PG (ref 26.6–33)
MCHC RBC AUTO-ENTMCNC: 33.6 G/DL (ref 31.5–35.7)
MCV RBC AUTO: 88.5 FL (ref 79–97)
PLATELET # BLD AUTO: 202 10*3/MM3 (ref 140–450)
PMV BLD AUTO: 9.7 FL (ref 6–12)
POTASSIUM BLD-SCNC: 4.1 MMOL/L (ref 3.5–5.2)
RBC # BLD AUTO: 5.15 10*6/MM3 (ref 3.77–5.28)
SODIUM BLD-SCNC: 141 MMOL/L (ref 136–145)
WBC NRBC COR # BLD: 7.39 10*3/MM3 (ref 3.4–10.8)

## 2020-03-19 PROCEDURE — 80048 BASIC METABOLIC PNL TOTAL CA: CPT | Performed by: NEUROLOGICAL SURGERY

## 2020-03-19 PROCEDURE — 36415 COLL VENOUS BLD VENIPUNCTURE: CPT

## 2020-03-19 PROCEDURE — 93005 ELECTROCARDIOGRAM TRACING: CPT

## 2020-03-19 PROCEDURE — 85027 COMPLETE CBC AUTOMATED: CPT | Performed by: NEUROLOGICAL SURGERY

## 2020-03-19 PROCEDURE — 93010 ELECTROCARDIOGRAM REPORT: CPT | Performed by: INTERNAL MEDICINE

## 2020-03-19 RX ORDER — CHLORHEXIDINE GLUCONATE 500 MG/1
1 CLOTH TOPICAL TAKE AS DIRECTED
COMMUNITY
End: 2020-03-26 | Stop reason: HOSPADM

## 2020-03-19 RX ORDER — ACETAMINOPHEN 500 MG
1000 TABLET ORAL EVERY 6 HOURS PRN
COMMUNITY
End: 2020-03-26 | Stop reason: HOSPADM

## 2020-03-19 NOTE — DISCHARGE INSTRUCTIONS
Take the following medications the morning of surgery:    cymbalta    General Instructions:  • Do not eat solid food after midnight the night before surgery.  • You may drink clear liquids day of surgery but must stop at least one hour before your hospital arrival time.  • It is beneficial for you to have a clear drink that contains carbohydrates the day of surgery.  We suggest a 12 to 20 ounce bottle of Gatorade or Powerade for non-diabetic patients or a 12 to 20 ounce bottle of G2 or Powerade Zero for diabetic patients. (Pediatric patients, are not advised to drink a 12 to 20 ounce carbohydrate drink)    Clear liquids are liquids you can see through.  Nothing red in color.     Plain water                               Sports drinks  Sodas                                   Gelatin (Jell-O)  Fruit juices without pulp such as white grape juice and apple juice  Popsicles that contain no fruit or yogurt  Tea or coffee (no cream or milk added)  Gatorade / Powerade  G2 / Powerade Zero    • Infants may have breast milk up to four hours before surgery.  • Infants drinking formula may drink formula up to six hours before surgery.   • Patients who avoid smoking, chewing tobacco and alcohol for 4 weeks prior to surgery have a reduced risk of post-operative complications.  Quit smoking as many days before surgery as you can.  • Do not smoke, use chewing tobacco or drink alcohol the day of surgery.   • If applicable bring your C-PAP/ BI-PAP machine.  • Bring any papers given to you in the doctor’s office.  • Wear clean comfortable clothes.  • Do not wear contact lenses, false eyelashes or make-up.  Bring a case for your glasses.   • Bring crutches or walker if applicable.  • Remove all piercings.  Leave jewelry and any other valuables at home.  • Hair extensions with metal clips must be removed prior to surgery.  • The Pre-Admission Testing nurse will instruct you to bring medications if unable to obtain an accurate list in  Pre-Admission Testing.        If you were given a blood bank ID arm band remember to bring it with you the day of surgery.    Preventing a Surgical Site Infection:  • For 2 to 3 days before surgery, avoid shaving with a razor because the razor can irritate skin and make it easier to develop an infection.    • Any areas of open skin can increase the risk of a post-operative wound infection by allowing bacteria to enter and travel throughout the body.  Notify your surgeon if you have any skin wounds / rashes even if it is not near the expected surgical site.  The area will need assessed to determine if surgery should be delayed until it is healed.  • The night prior to surgery shower using a fresh bar of anti-bacterial soap (such as Dial) and clean washcloth.  Sleep in a clean bed with clean clothing.  Do not allow pets to sleep with you.  • Shower on the morning of surgery using a fresh bar of anti-bacterial soap (such as Dial) and clean washcloth.  Dry with a clean towel and dress in clean clothing.  • Ask your surgeon if you will be receiving antibiotics prior to surgery.  • Make sure you, your family, and all healthcare providers clean their hands with soap and water or an alcohol based hand  before caring for you or your wound.    Day of surgery:3/23/2020   0800  Your arrival time is approximately two hours before your scheduled surgery time.  Upon arrival, a Pre-op nurse and Anesthesiologist will review your health history, obtain vital signs, and answer questions you may have.  The only belongings needed at this time will be a list of your home medications and if applicable your C-PAP/BI-PAP machine.  If you are staying overnight your family can leave the rest of your belongings in the car and bring them to your room later.  A Pre-op nurse will start an IV and you may receive medication in preparation for surgery, including something to help you relax.  Your family will be able to see you in the Pre-op  area.  Two visitors at a time will be allowed in the Pre-op room.  While you are in surgery your family should notify the waiting room  if they leave the waiting room area and provide a contact phone number.    Please be aware that surgery does come with discomfort.  We want to make every effort to control your discomfort so please discuss any uncontrolled symptoms with your nurse.   Your doctor will most likely have prescribed pain medications.      If you are going home after surgery you will receive individualized written care instructions before being discharged.  A responsible adult must drive you to and from the hospital on the day of your surgery and stay with you for 24 hours.    If you are staying overnight following surgery, you will be transported to your hospital room following the recovery period.  [facility] has all private rooms.    If you have any questions please call Pre-Admission Testing at [phone].  Deductibles and co-payments are collected on the day of service. Please be prepared to pay the required co-pay, deductible or deposit on the day of service as defined by your plan.CHLORHEXIDINE CLOTH INSTRUCTIONS  The morning of surgery follow these instructions using the Chlorhexidine cloths you've been given.  These steps reduce bacteria on the body.  Do not use the cloths near your eyes, ears mouth, genitalia or on open wounds.  Throw the cloths away after use but do not try to flush them down a toilet.      • Open and remove one cloth at a time from the package.    • Leave the cloth unfolded and begin the bathing.  • Massage the skin with the cloths using gentle pressure to remove bacteria.  Do not scrub harshly.   • Follow the steps below with one 2% CHG cloth per area (6 total cloths).  • One cloth for neck, shoulders and chest.  • One cloth for both arms, hands, fingers and underarms (do underarms last).  • One cloth for the abdomen followed by groin.  • One  cloth for right leg and foot including between the toes.  • One cloth for left leg and foot including between the toes.  • The last cloth is to be used for the back of the neck, back and buttocks.    Allow the CHG to air dry 3 minutes on the skin which will give it time to work and decrease the chance of irritation.  The skin may feel sticky until it is dry.  Do not rinse with water or any other liquid or you will lose the beneficial effects of the CHG.  If mild skin irritation occurs, do rinse the skin to remove the CHG.  Report this to the nurse at time of admission.  Do not apply lotions, creams, ointments, deodorants or perfumes after using the clothes. Dress in clean clothes before coming to the hospital.

## 2020-03-23 ENCOUNTER — ANESTHESIA (OUTPATIENT)
Dept: PERIOP | Facility: HOSPITAL | Age: 65
End: 2020-03-23

## 2020-03-23 ENCOUNTER — HOSPITAL ENCOUNTER (OUTPATIENT)
Facility: HOSPITAL | Age: 65
Discharge: HOME | End: 2020-03-26
Attending: NEUROLOGICAL SURGERY | Admitting: NEUROLOGICAL SURGERY

## 2020-03-23 ENCOUNTER — APPOINTMENT (OUTPATIENT)
Dept: GENERAL RADIOLOGY | Facility: HOSPITAL | Age: 65
End: 2020-03-23

## 2020-03-23 ENCOUNTER — ANESTHESIA EVENT (OUTPATIENT)
Dept: PERIOP | Facility: HOSPITAL | Age: 65
End: 2020-03-23

## 2020-03-23 DIAGNOSIS — M54.12 CERVICAL RADICULITIS: Primary | ICD-10-CM

## 2020-03-23 DIAGNOSIS — M54.12 CERVICAL RADICULITIS: ICD-10-CM

## 2020-03-23 PROCEDURE — 76000 FLUOROSCOPY <1 HR PHYS/QHP: CPT

## 2020-03-23 PROCEDURE — 22853 INSJ BIOMECHANICAL DEVICE: CPT | Performed by: NEUROLOGICAL SURGERY

## 2020-03-23 PROCEDURE — 25010000002 ONDANSETRON PER 1 MG: Performed by: REGISTERED NURSE

## 2020-03-23 PROCEDURE — L0172 CERV COL SR FOAM 2PC PRE OTS: HCPCS | Performed by: NEUROLOGICAL SURGERY

## 2020-03-23 PROCEDURE — 0: Performed by: NEUROLOGICAL SURGERY

## 2020-03-23 PROCEDURE — C1713 ANCHOR/SCREW BN/BN,TIS/BN: HCPCS | Performed by: NEUROLOGICAL SURGERY

## 2020-03-23 PROCEDURE — 72040 X-RAY EXAM NECK SPINE 2-3 VW: CPT

## 2020-03-23 PROCEDURE — C1889 IMPLANT/INSERT DEVICE, NOC: HCPCS | Performed by: NEUROLOGICAL SURGERY

## 2020-03-23 PROCEDURE — 25010000002 HYDROMORPHONE PER 4 MG: Performed by: REGISTERED NURSE

## 2020-03-23 PROCEDURE — 22552 ARTHRD ANT NTRBD CERVICAL EA: CPT | Performed by: NEUROLOGICAL SURGERY

## 2020-03-23 PROCEDURE — 22845 INSERT SPINE FIXATION DEVICE: CPT | Performed by: SPECIALIST/TECHNOLOGIST, OTHER

## 2020-03-23 PROCEDURE — 25010000002 DEXAMETHASONE PER 1 MG: Performed by: REGISTERED NURSE

## 2020-03-23 PROCEDURE — 25010000002 FENTANYL CITRATE (PF) 100 MCG/2ML SOLUTION: Performed by: REGISTERED NURSE

## 2020-03-23 PROCEDURE — P9041 ALBUMIN (HUMAN),5%, 50ML: HCPCS | Performed by: REGISTERED NURSE

## 2020-03-23 PROCEDURE — 25010000002 MORPHINE PER 10 MG: Performed by: NEUROLOGICAL SURGERY

## 2020-03-23 PROCEDURE — 25010000003 CEFAZOLIN PER 500 MG: Performed by: NEUROLOGICAL SURGERY

## 2020-03-23 PROCEDURE — 22845 INSERT SPINE FIXATION DEVICE: CPT | Performed by: NEUROLOGICAL SURGERY

## 2020-03-23 PROCEDURE — 25010000003 CEFAZOLIN IN DEXTROSE 2-4 GM/100ML-% SOLUTION: Performed by: NEUROLOGICAL SURGERY

## 2020-03-23 PROCEDURE — 25810000003 SODIUM CHLORIDE 0.9 % WITH KCL 20 MEQ 20-0.9 MEQ/L-% SOLUTION: Performed by: NEUROLOGICAL SURGERY

## 2020-03-23 PROCEDURE — 22551 ARTHRD ANT NTRBDY CERVICAL: CPT | Performed by: SPECIALIST/TECHNOLOGIST, OTHER

## 2020-03-23 PROCEDURE — 25010000002 MIDAZOLAM PER 1 MG: Performed by: ANESTHESIOLOGY

## 2020-03-23 PROCEDURE — 25010000002 ALBUMIN HUMAN 5% PER 50 ML: Performed by: REGISTERED NURSE

## 2020-03-23 PROCEDURE — 22551 ARTHRD ANT NTRBDY CERVICAL: CPT | Performed by: NEUROLOGICAL SURGERY

## 2020-03-23 PROCEDURE — 22853 INSJ BIOMECHANICAL DEVICE: CPT | Performed by: SPECIALIST/TECHNOLOGIST, OTHER

## 2020-03-23 PROCEDURE — 25010000002 NEOSTIGMINE PER 0.5 MG: Performed by: REGISTERED NURSE

## 2020-03-23 PROCEDURE — 25010000002 PROPOFOL 10 MG/ML EMULSION: Performed by: REGISTERED NURSE

## 2020-03-23 PROCEDURE — 25010000002 SUCCINYLCHOLINE PER 20 MG: Performed by: REGISTERED NURSE

## 2020-03-23 PROCEDURE — 22552 ARTHRD ANT NTRBD CERVICAL EA: CPT | Performed by: SPECIALIST/TECHNOLOGIST, OTHER

## 2020-03-23 PROCEDURE — 25010000002 HYDROMORPHONE 1 MG/ML SOLUTION: Performed by: REGISTERED NURSE

## 2020-03-23 DEVICE — INTERBODY FUSION DEVICE NANOLOCK SURFACE TECHNOLOGY 6 DEGREE MEDIUM 7MM
Type: IMPLANTABLE DEVICE | Site: SPINE CERVICAL | Status: FUNCTIONAL
Brand: ENDOSKELETON TC

## 2020-03-23 DEVICE — FLOSEAL HEMOSTATIC MATRIX, 5ML
Type: IMPLANTABLE DEVICE | Site: SPINE CERVICAL | Status: FUNCTIONAL
Brand: FLOSEAL HEMOSTATIC MATRIX

## 2020-03-23 DEVICE — PLATE 7200042 ATL VISION ELITE 42.5MM
Type: IMPLANTABLE DEVICE | Site: SPINE CERVICAL | Status: FUNCTIONAL
Brand: ATLANTIS® ANTERIOR CERVICAL PLATE SYSTEM

## 2020-03-23 DEVICE — PUTTY DBF GRAFTON 3CC: Type: IMPLANTABLE DEVICE | Site: SPINE CERVICAL | Status: FUNCTIONAL

## 2020-03-23 DEVICE — SSC BONE WAX
Type: IMPLANTABLE DEVICE | Site: SPINE CERVICAL | Status: FUNCTIONAL
Brand: SSC BONE WAX

## 2020-03-23 RX ORDER — DIPHENHYDRAMINE HCL 25 MG
25 CAPSULE ORAL
Status: DISCONTINUED | OUTPATIENT
Start: 2020-03-23 | End: 2020-03-23 | Stop reason: HOSPADM

## 2020-03-23 RX ORDER — PROMETHAZINE HYDROCHLORIDE 25 MG/1
25 TABLET ORAL ONCE AS NEEDED
Status: DISCONTINUED | OUTPATIENT
Start: 2020-03-23 | End: 2020-03-23 | Stop reason: HOSPADM

## 2020-03-23 RX ORDER — DEXAMETHASONE SODIUM PHOSPHATE 10 MG/ML
INJECTION INTRAMUSCULAR; INTRAVENOUS AS NEEDED
Status: DISCONTINUED | OUTPATIENT
Start: 2020-03-23 | End: 2020-03-23 | Stop reason: SURG

## 2020-03-23 RX ORDER — HYDROCODONE BITARTRATE AND ACETAMINOPHEN 5; 325 MG/1; MG/1
1 TABLET ORAL EVERY 4 HOURS PRN
Status: DISCONTINUED | OUTPATIENT
Start: 2020-03-23 | End: 2020-03-26 | Stop reason: HOSPADM

## 2020-03-23 RX ORDER — MORPHINE SULFATE 2 MG/ML
2 INJECTION, SOLUTION INTRAMUSCULAR; INTRAVENOUS EVERY 4 HOURS PRN
Status: DISCONTINUED | OUTPATIENT
Start: 2020-03-23 | End: 2020-03-26 | Stop reason: HOSPADM

## 2020-03-23 RX ORDER — NALOXONE HCL 0.4 MG/ML
0.2 VIAL (ML) INJECTION AS NEEDED
Status: DISCONTINUED | OUTPATIENT
Start: 2020-03-23 | End: 2020-03-23 | Stop reason: HOSPADM

## 2020-03-23 RX ORDER — HYDROMORPHONE HCL 110MG/55ML
PATIENT CONTROLLED ANALGESIA SYRINGE INTRAVENOUS AS NEEDED
Status: DISCONTINUED | OUTPATIENT
Start: 2020-03-23 | End: 2020-03-23 | Stop reason: SURG

## 2020-03-23 RX ORDER — PROPOFOL 10 MG/ML
VIAL (ML) INTRAVENOUS AS NEEDED
Status: DISCONTINUED | OUTPATIENT
Start: 2020-03-23 | End: 2020-03-23 | Stop reason: SURG

## 2020-03-23 RX ORDER — MIDAZOLAM HYDROCHLORIDE 1 MG/ML
2 INJECTION INTRAMUSCULAR; INTRAVENOUS
Status: DISCONTINUED | OUTPATIENT
Start: 2020-03-23 | End: 2020-03-23 | Stop reason: HOSPADM

## 2020-03-23 RX ORDER — EPHEDRINE SULFATE 50 MG/ML
5 INJECTION, SOLUTION INTRAVENOUS ONCE AS NEEDED
Status: DISCONTINUED | OUTPATIENT
Start: 2020-03-23 | End: 2020-03-23 | Stop reason: HOSPADM

## 2020-03-23 RX ORDER — ONDANSETRON 2 MG/ML
4 INJECTION INTRAMUSCULAR; INTRAVENOUS EVERY 6 HOURS PRN
Status: DISCONTINUED | OUTPATIENT
Start: 2020-03-23 | End: 2020-03-26 | Stop reason: HOSPADM

## 2020-03-23 RX ORDER — ONDANSETRON 2 MG/ML
4 INJECTION INTRAMUSCULAR; INTRAVENOUS ONCE AS NEEDED
Status: DISCONTINUED | OUTPATIENT
Start: 2020-03-23 | End: 2020-03-23 | Stop reason: HOSPADM

## 2020-03-23 RX ORDER — ACETAMINOPHEN 500 MG
1000 TABLET ORAL EVERY 6 HOURS PRN
Status: DISCONTINUED | OUTPATIENT
Start: 2020-03-23 | End: 2020-03-26 | Stop reason: HOSPADM

## 2020-03-23 RX ORDER — ACETAMINOPHEN 325 MG/1
650 TABLET ORAL ONCE AS NEEDED
Status: DISCONTINUED | OUTPATIENT
Start: 2020-03-23 | End: 2020-03-23 | Stop reason: HOSPADM

## 2020-03-23 RX ORDER — SODIUM CHLORIDE 0.9 % (FLUSH) 0.9 %
3 SYRINGE (ML) INJECTION EVERY 12 HOURS SCHEDULED
Status: DISCONTINUED | OUTPATIENT
Start: 2020-03-23 | End: 2020-03-26 | Stop reason: HOSPADM

## 2020-03-23 RX ORDER — ALBUTEROL SULFATE 90 UG/1
AEROSOL, METERED RESPIRATORY (INHALATION) AS NEEDED
Status: DISCONTINUED | OUTPATIENT
Start: 2020-03-23 | End: 2020-03-23 | Stop reason: SURG

## 2020-03-23 RX ORDER — ROCURONIUM BROMIDE 10 MG/ML
INJECTION, SOLUTION INTRAVENOUS AS NEEDED
Status: DISCONTINUED | OUTPATIENT
Start: 2020-03-23 | End: 2020-03-23 | Stop reason: SURG

## 2020-03-23 RX ORDER — IPRATROPIUM BROMIDE AND ALBUTEROL SULFATE 2.5; .5 MG/3ML; MG/3ML
3 SOLUTION RESPIRATORY (INHALATION)
Status: DISCONTINUED | OUTPATIENT
Start: 2020-03-23 | End: 2020-03-26 | Stop reason: HOSPADM

## 2020-03-23 RX ORDER — BACTERIOSTATIC SODIUM CHLORIDE 0.9 %
VIAL (ML) INJECTION AS NEEDED
Status: DISCONTINUED | OUTPATIENT
Start: 2020-03-23 | End: 2020-03-23 | Stop reason: HOSPADM

## 2020-03-23 RX ORDER — HYDROMORPHONE HYDROCHLORIDE 1 MG/ML
0.5 INJECTION, SOLUTION INTRAMUSCULAR; INTRAVENOUS; SUBCUTANEOUS
Status: DISCONTINUED | OUTPATIENT
Start: 2020-03-23 | End: 2020-03-23 | Stop reason: HOSPADM

## 2020-03-23 RX ORDER — SODIUM CHLORIDE 0.9 % (FLUSH) 0.9 %
10 SYRINGE (ML) INJECTION EVERY 12 HOURS SCHEDULED
Status: DISCONTINUED | OUTPATIENT
Start: 2020-03-23 | End: 2020-03-23 | Stop reason: HOSPADM

## 2020-03-23 RX ORDER — OXYCODONE AND ACETAMINOPHEN 7.5; 325 MG/1; MG/1
1 TABLET ORAL ONCE AS NEEDED
Status: COMPLETED | OUTPATIENT
Start: 2020-03-23 | End: 2020-03-23

## 2020-03-23 RX ORDER — PROMETHAZINE HYDROCHLORIDE 25 MG/1
25 SUPPOSITORY RECTAL ONCE AS NEEDED
Status: DISCONTINUED | OUTPATIENT
Start: 2020-03-23 | End: 2020-03-23 | Stop reason: HOSPADM

## 2020-03-23 RX ORDER — NALOXONE HCL 0.4 MG/ML
0.4 VIAL (ML) INJECTION
Status: DISCONTINUED | OUTPATIENT
Start: 2020-03-23 | End: 2020-03-26 | Stop reason: HOSPADM

## 2020-03-23 RX ORDER — DULOXETIN HYDROCHLORIDE 60 MG/1
60 CAPSULE, DELAYED RELEASE ORAL DAILY
Status: DISCONTINUED | OUTPATIENT
Start: 2020-03-24 | End: 2020-03-26 | Stop reason: HOSPADM

## 2020-03-23 RX ORDER — SODIUM CHLORIDE, SODIUM LACTATE, POTASSIUM CHLORIDE, CALCIUM CHLORIDE 600; 310; 30; 20 MG/100ML; MG/100ML; MG/100ML; MG/100ML
9 INJECTION, SOLUTION INTRAVENOUS CONTINUOUS
Status: DISCONTINUED | OUTPATIENT
Start: 2020-03-23 | End: 2020-03-23

## 2020-03-23 RX ORDER — CEFAZOLIN SODIUM 2 G/100ML
2 INJECTION, SOLUTION INTRAVENOUS EVERY 8 HOURS
Status: COMPLETED | OUTPATIENT
Start: 2020-03-23 | End: 2020-03-24

## 2020-03-23 RX ORDER — ONDANSETRON 2 MG/ML
INJECTION INTRAMUSCULAR; INTRAVENOUS AS NEEDED
Status: DISCONTINUED | OUTPATIENT
Start: 2020-03-23 | End: 2020-03-23 | Stop reason: SURG

## 2020-03-23 RX ORDER — ESMOLOL HYDROCHLORIDE 10 MG/ML
INJECTION INTRAVENOUS AS NEEDED
Status: DISCONTINUED | OUTPATIENT
Start: 2020-03-23 | End: 2020-03-23 | Stop reason: SURG

## 2020-03-23 RX ORDER — HYDRALAZINE HYDROCHLORIDE 20 MG/ML
5 INJECTION INTRAMUSCULAR; INTRAVENOUS
Status: DISCONTINUED | OUTPATIENT
Start: 2020-03-23 | End: 2020-03-23 | Stop reason: HOSPADM

## 2020-03-23 RX ORDER — SODIUM CHLORIDE 0.9 % (FLUSH) 0.9 %
10 SYRINGE (ML) INJECTION AS NEEDED
Status: DISCONTINUED | OUTPATIENT
Start: 2020-03-23 | End: 2020-03-23 | Stop reason: HOSPADM

## 2020-03-23 RX ORDER — PROMETHAZINE HYDROCHLORIDE 12.5 MG/1
12.5 TABLET ORAL EVERY 6 HOURS PRN
Status: DISCONTINUED | OUTPATIENT
Start: 2020-03-23 | End: 2020-03-26 | Stop reason: HOSPADM

## 2020-03-23 RX ORDER — DIPHENHYDRAMINE HYDROCHLORIDE 50 MG/ML
12.5 INJECTION INTRAMUSCULAR; INTRAVENOUS
Status: DISCONTINUED | OUTPATIENT
Start: 2020-03-23 | End: 2020-03-23 | Stop reason: HOSPADM

## 2020-03-23 RX ORDER — ASPIRIN 81 MG/1
81 TABLET ORAL DAILY
Status: DISCONTINUED | OUTPATIENT
Start: 2020-03-24 | End: 2020-03-24

## 2020-03-23 RX ORDER — ONDANSETRON 4 MG/1
4 TABLET, FILM COATED ORAL EVERY 6 HOURS PRN
Status: DISCONTINUED | OUTPATIENT
Start: 2020-03-23 | End: 2020-03-26 | Stop reason: HOSPADM

## 2020-03-23 RX ORDER — SODIUM CHLORIDE 9 MG/ML
INJECTION, SOLUTION INTRAVENOUS AS NEEDED
Status: DISCONTINUED | OUTPATIENT
Start: 2020-03-23 | End: 2020-03-23 | Stop reason: HOSPADM

## 2020-03-23 RX ORDER — LIDOCAINE HYDROCHLORIDE 20 MG/ML
INJECTION, SOLUTION INFILTRATION; PERINEURAL AS NEEDED
Status: DISCONTINUED | OUTPATIENT
Start: 2020-03-23 | End: 2020-03-23 | Stop reason: SURG

## 2020-03-23 RX ORDER — GLYCOPYRROLATE 0.2 MG/ML
INJECTION INTRAMUSCULAR; INTRAVENOUS AS NEEDED
Status: DISCONTINUED | OUTPATIENT
Start: 2020-03-23 | End: 2020-03-23 | Stop reason: SURG

## 2020-03-23 RX ORDER — HYDROCODONE BITARTRATE AND ACETAMINOPHEN 7.5; 325 MG/1; MG/1
1 TABLET ORAL ONCE AS NEEDED
Status: DISCONTINUED | OUTPATIENT
Start: 2020-03-23 | End: 2020-03-23 | Stop reason: HOSPADM

## 2020-03-23 RX ORDER — ALBUMIN, HUMAN INJ 5% 5 %
SOLUTION INTRAVENOUS CONTINUOUS PRN
Status: DISCONTINUED | OUTPATIENT
Start: 2020-03-23 | End: 2020-03-23 | Stop reason: SURG

## 2020-03-23 RX ORDER — LABETALOL HYDROCHLORIDE 5 MG/ML
INJECTION, SOLUTION INTRAVENOUS AS NEEDED
Status: DISCONTINUED | OUTPATIENT
Start: 2020-03-23 | End: 2020-03-23 | Stop reason: SURG

## 2020-03-23 RX ORDER — PROMETHAZINE HYDROCHLORIDE 25 MG/ML
6.25 INJECTION, SOLUTION INTRAMUSCULAR; INTRAVENOUS
Status: DISCONTINUED | OUTPATIENT
Start: 2020-03-23 | End: 2020-03-23 | Stop reason: HOSPADM

## 2020-03-23 RX ORDER — FENTANYL CITRATE 50 UG/ML
INJECTION, SOLUTION INTRAMUSCULAR; INTRAVENOUS AS NEEDED
Status: DISCONTINUED | OUTPATIENT
Start: 2020-03-23 | End: 2020-03-23 | Stop reason: SURG

## 2020-03-23 RX ORDER — FAMOTIDINE 10 MG/ML
20 INJECTION, SOLUTION INTRAVENOUS ONCE
Status: COMPLETED | OUTPATIENT
Start: 2020-03-23 | End: 2020-03-23

## 2020-03-23 RX ORDER — SUCCINYLCHOLINE CHLORIDE 20 MG/ML
INJECTION INTRAMUSCULAR; INTRAVENOUS AS NEEDED
Status: DISCONTINUED | OUTPATIENT
Start: 2020-03-23 | End: 2020-03-23 | Stop reason: SURG

## 2020-03-23 RX ORDER — SODIUM CHLORIDE 0.9 % (FLUSH) 0.9 %
10 SYRINGE (ML) INJECTION AS NEEDED
Status: DISCONTINUED | OUTPATIENT
Start: 2020-03-23 | End: 2020-03-26 | Stop reason: HOSPADM

## 2020-03-23 RX ORDER — CEFAZOLIN SODIUM 2 G/100ML
2 INJECTION, SOLUTION INTRAVENOUS ONCE
Status: COMPLETED | OUTPATIENT
Start: 2020-03-23 | End: 2020-03-23

## 2020-03-23 RX ORDER — FLUMAZENIL 0.1 MG/ML
0.2 INJECTION INTRAVENOUS AS NEEDED
Status: DISCONTINUED | OUTPATIENT
Start: 2020-03-23 | End: 2020-03-23 | Stop reason: HOSPADM

## 2020-03-23 RX ORDER — SODIUM CHLORIDE AND POTASSIUM CHLORIDE 150; 900 MG/100ML; MG/100ML
100 INJECTION, SOLUTION INTRAVENOUS CONTINUOUS
Status: DISCONTINUED | OUTPATIENT
Start: 2020-03-23 | End: 2020-03-24

## 2020-03-23 RX ORDER — FENTANYL CITRATE 50 UG/ML
50 INJECTION, SOLUTION INTRAMUSCULAR; INTRAVENOUS
Status: DISCONTINUED | OUTPATIENT
Start: 2020-03-23 | End: 2020-03-23 | Stop reason: HOSPADM

## 2020-03-23 RX ORDER — MIDAZOLAM HYDROCHLORIDE 1 MG/ML
1 INJECTION INTRAMUSCULAR; INTRAVENOUS
Status: DISCONTINUED | OUTPATIENT
Start: 2020-03-23 | End: 2020-03-23 | Stop reason: HOSPADM

## 2020-03-23 RX ORDER — LABETALOL HYDROCHLORIDE 5 MG/ML
5 INJECTION, SOLUTION INTRAVENOUS
Status: DISCONTINUED | OUTPATIENT
Start: 2020-03-23 | End: 2020-03-23 | Stop reason: HOSPADM

## 2020-03-23 RX ORDER — PROMETHAZINE HYDROCHLORIDE 25 MG/ML
12.5 INJECTION, SOLUTION INTRAMUSCULAR; INTRAVENOUS ONCE AS NEEDED
Status: DISCONTINUED | OUTPATIENT
Start: 2020-03-23 | End: 2020-03-23 | Stop reason: HOSPADM

## 2020-03-23 RX ADMIN — DEXAMETHASONE SODIUM PHOSPHATE 10 MG: 10 INJECTION INTRAMUSCULAR; INTRAVENOUS at 10:40

## 2020-03-23 RX ADMIN — LABETALOL 20 MG/4 ML (5 MG/ML) INTRAVENOUS SYRINGE 5 MG: at 14:57

## 2020-03-23 RX ADMIN — HYDROMORPHONE HYDROCHLORIDE 0.5 MG: 1 INJECTION, SOLUTION INTRAMUSCULAR; INTRAVENOUS; SUBCUTANEOUS at 15:45

## 2020-03-23 RX ADMIN — LABETALOL HYDROCHLORIDE 5 MG: 5 INJECTION, SOLUTION INTRAVENOUS at 11:54

## 2020-03-23 RX ADMIN — SODIUM CHLORIDE, POTASSIUM CHLORIDE, SODIUM LACTATE AND CALCIUM CHLORIDE 9 ML/HR: 600; 310; 30; 20 INJECTION, SOLUTION INTRAVENOUS at 08:32

## 2020-03-23 RX ADMIN — HYDROMORPHONE HYDROCHLORIDE 0.5 MG: 1 INJECTION, SOLUTION INTRAMUSCULAR; INTRAVENOUS; SUBCUTANEOUS at 16:06

## 2020-03-23 RX ADMIN — SUCCINYLCHOLINE CHLORIDE 150 MG: 20 INJECTION, SOLUTION INTRAMUSCULAR; INTRAVENOUS; PARENTERAL at 10:31

## 2020-03-23 RX ADMIN — FENTANYL CITRATE 50 MCG: 50 INJECTION, SOLUTION INTRAMUSCULAR; INTRAVENOUS at 10:38

## 2020-03-23 RX ADMIN — FENTANYL CITRATE 50 MCG: 0.05 INJECTION, SOLUTION INTRAMUSCULAR; INTRAVENOUS at 15:02

## 2020-03-23 RX ADMIN — HYDROMORPHONE HYDROCHLORIDE 1 MG: 2 INJECTION, SOLUTION INTRAMUSCULAR; INTRAVENOUS; SUBCUTANEOUS at 11:25

## 2020-03-23 RX ADMIN — PROPOFOL 150 MG: 10 INJECTION, EMULSION INTRAVENOUS at 10:31

## 2020-03-23 RX ADMIN — ONDANSETRON HYDROCHLORIDE 4 MG: 2 SOLUTION INTRAMUSCULAR; INTRAVENOUS at 10:40

## 2020-03-23 RX ADMIN — LIDOCAINE HYDROCHLORIDE 100 MG: 20 INJECTION, SOLUTION INFILTRATION; PERINEURAL at 10:31

## 2020-03-23 RX ADMIN — LABETALOL 20 MG/4 ML (5 MG/ML) INTRAVENOUS SYRINGE 5 MG: at 15:08

## 2020-03-23 RX ADMIN — CEFAZOLIN SODIUM 2 G: 2 INJECTION, SOLUTION INTRAVENOUS at 10:27

## 2020-03-23 RX ADMIN — FENTANYL CITRATE 50 MCG: 0.05 INJECTION, SOLUTION INTRAMUSCULAR; INTRAVENOUS at 13:25

## 2020-03-23 RX ADMIN — ESMOLOL HYDROCHLORIDE 20 MG: 10 INJECTION, SOLUTION INTRAVENOUS at 13:00

## 2020-03-23 RX ADMIN — MIDAZOLAM 1 MG: 1 INJECTION INTRAMUSCULAR; INTRAVENOUS at 08:32

## 2020-03-23 RX ADMIN — FENTANYL CITRATE 50 MCG: 50 INJECTION, SOLUTION INTRAMUSCULAR; INTRAVENOUS at 10:53

## 2020-03-23 RX ADMIN — ROCURONIUM BROMIDE 40 MG: 10 INJECTION, SOLUTION INTRAVENOUS at 10:35

## 2020-03-23 RX ADMIN — MIDAZOLAM 1 MG: 1 INJECTION INTRAMUSCULAR; INTRAVENOUS at 10:02

## 2020-03-23 RX ADMIN — HYDROMORPHONE HYDROCHLORIDE 1 MG: 2 INJECTION, SOLUTION INTRAMUSCULAR; INTRAVENOUS; SUBCUTANEOUS at 12:47

## 2020-03-23 RX ADMIN — ESMOLOL HYDROCHLORIDE 20 MG: 10 INJECTION, SOLUTION INTRAVENOUS at 13:09

## 2020-03-23 RX ADMIN — POTASSIUM CHLORIDE AND SODIUM CHLORIDE 100 ML/HR: 900; 150 INJECTION, SOLUTION INTRAVENOUS at 17:06

## 2020-03-23 RX ADMIN — FENTANYL CITRATE 50 MCG: 50 INJECTION, SOLUTION INTRAMUSCULAR; INTRAVENOUS at 11:19

## 2020-03-23 RX ADMIN — HYDROCODONE BITARTRATE AND ACETAMINOPHEN 1 TABLET: 5; 325 TABLET ORAL at 20:07

## 2020-03-23 RX ADMIN — CEFAZOLIN SODIUM 2 G: 2 INJECTION, SOLUTION INTRAVENOUS at 19:55

## 2020-03-23 RX ADMIN — LABETALOL HYDROCHLORIDE 5 MG: 5 INJECTION, SOLUTION INTRAVENOUS at 11:50

## 2020-03-23 RX ADMIN — OXYCODONE HYDROCHLORIDE AND ACETAMINOPHEN 1 TABLET: 7.5; 325 TABLET ORAL at 16:06

## 2020-03-23 RX ADMIN — NEOSTIGMINE METHYLSULFATE 3 MG: 1 INJECTION INTRAMUSCULAR; INTRAVENOUS; SUBCUTANEOUS at 12:45

## 2020-03-23 RX ADMIN — FAMOTIDINE 20 MG: 10 INJECTION, SOLUTION INTRAVENOUS at 08:33

## 2020-03-23 RX ADMIN — ALBUTEROL SULFATE 6 PUFF: 90 AEROSOL, METERED RESPIRATORY (INHALATION) at 13:02

## 2020-03-23 RX ADMIN — ALBUMIN HUMAN: 0.05 INJECTION, SOLUTION INTRAVENOUS at 12:23

## 2020-03-23 RX ADMIN — GLYCOPYRROLATE 0.6 MG: 0.2 INJECTION INTRAMUSCULAR; INTRAVENOUS at 12:45

## 2020-03-23 RX ADMIN — FENTANYL CITRATE 100 MCG: 50 INJECTION, SOLUTION INTRAMUSCULAR; INTRAVENOUS at 10:31

## 2020-03-23 RX ADMIN — MORPHINE SULFATE 2 MG: 2 INJECTION, SOLUTION INTRAMUSCULAR; INTRAVENOUS at 17:06

## 2020-03-23 NOTE — ANESTHESIA PREPROCEDURE EVALUATION
Anesthesia Evaluation     Patient summary reviewed and Nursing notes reviewed   history of anesthetic complications: difficult airway  NPO Solid Status: > 6 hours  NPO Liquid Status: > 6 hours           Airway   Mallampati: III  TM distance: <3 FB  Neck ROM: limited  Difficult intubation highly probable  Dental - normal exam     Pulmonary - normal exam    breath sounds clear to auscultation  (+) sleep apnea,   (-) rhonchi, decreased breath sounds, wheezes, rales, stridor  Cardiovascular - negative cardio ROS and normal exam    NYHA Classification: I  ECG reviewed  Rhythm: regular  Rate: normal    (-) murmur, weak pulses, friction rub, systolic click, carotid bruits, JVD, peripheral edema      Neuro/Psych  (+) psychiatric history Depression,     GI/Hepatic/Renal/Endo    (+)   renal disease CRI,     Musculoskeletal     (+) back pain, neck pain, radiculopathy Right upper extremity  Abdominal  - normal exam    Abdomen: soft.   Substance History - negative use     OB/GYN negative ob/gyn ROS         Other - negative ROS                       Anesthesia Plan    ASA 3     general     intravenous induction     Anesthetic plan, all risks, benefits, and alternatives have been provided, discussed and informed consent has been obtained with: patient.

## 2020-03-23 NOTE — OP NOTE
Preoperative diagnosis: Cervical stenosis with cervical radiculopathy and neurologic deficit C4-5 and C5-6    Postoperative diagnosis: Same    Procedure performed: Anterior cervical discectomy C4-5 and C5-6 with anterior cervical fusion and instrumentation    Surgeon: Segundo Thomas M.D.    Assistant: Jacque Nair CFA who was instrumental in helping with hemostasis, visualization of neural structures, and retraction of neural structures.    Indications for the procedure: This patient was having severe symptoms in the neck and arms.   Imaging showed significant neural compressionin the cervical spine at C4-5 and C5-6.    Operative summary: After induction of general anesthesia with intravenous agents patient was intubated and placed on the operating table in the supine position.  The head was hyperextended on donut roll and a roll of sheets was placed under the shoulders.  The shoulders were taped down being careful not to stretch the brachial plexus too much.  All peripheral points of entrapment and pressure were padded and secured.   The neck was prepped with Chloraprep.    An incision was made in the right lower portion of the neck.  This was carried down to the platysma.  The platysma was opened in the direction of its fibers.  Dissection was carried down through the middle cervical fascia to the anterior aspect of the cervical spine.  A needle was placed in the first available disc space.  This did prove to be C4-5.  Attention was turned to that level in the next 1 down.  The longus colli muscles were elevated off both sides of the anterior cervical spine and then the Cloward retractors were locked under the longus colli muscles and used to hold the esophagus, trachea, and recurrent laryngeal bundle medially and the carotid bundle and its contents laterally.  The disc space at the C4-5 level was incised and disc material was removed with the 0 and 3-0 up-biting and straight curettes. The pituitary was also  used to remove disc material.  Following this the posterior lip of the vertebral body was drilled off in combination with the uncovertebral joints on each side.    The posterior longitudinal ligament was then opened and removed from foramen to foramen completely decompressing the spinal cord and the nerve roots.  Once the nerve roots were visualized in each neural foramen and found to be completely decompressed bleeding was controlled with a combination of the bipolar cautery, FloSeal, and thrombin and Gelfoam.  Once the bleeding was stopped the disc space was sized and a 7 mm Titan Breann cage was placed into the disc space.  It was filled first with [place] putty. The compression at this level was primarily due to stenosis.    Following this attention was turned to the C5-6 level.  At that level the same thing was done.  The disc material was removed, the posterior lip of the vertebral body was removed as well as the uncovertebral joints.  The posterior longitudinal ligament was then opened and removed. Bleeding was again controlled with the bipolar cautery FloSeal, and thrombin and Gelfoam.  Finally another titan Breann cage was placed at this level along with [place] putty.  This cage measured 7 mm.  At this level the compression was mostly due to stenosis.    A 42 mm Atlantis plate was then attached to the front of the cervical spine using 6 14 mm screws.    The retractors were removed and final x-rays taken. Bleeding was controlled with the bipolar cautery and a drain was placed in the anterior cervical space and tunneled subcutaneously. It was then sewn into place and the incision was closed in layers, dressed and the patient was taken to the recovery room in stable condition.  Sponge instrument and needle counts were correct at the end of the procedure.  I was a little bit concerned about the softness of this patient's bone and so I elected to place her in a hard collar postop.

## 2020-03-23 NOTE — ANESTHESIA PROCEDURE NOTES
Airway  Urgency: elective    Date/Time: 3/23/2020 10:33 AM  Airway not difficult    General Information and Staff    Patient location during procedure: OR  CRNA: Crystal Ghotra CRNA    Indications and Patient Condition  Indications for airway management: airway protection    Preoxygenated: yes  MILS maintained throughout  Mask difficulty assessment: 0 - not attempted    Final Airway Details  Final airway type: endotracheal airway      Successful airway: ETT  Cuffed: yes   Successful intubation technique: video laryngoscopy  Facilitating devices/methods: intubating stylet  Endotracheal tube insertion site: oral  Blade: CMAC  Blade size: D  ETT size (mm): 7.0  Cormack-Lehane Classification: grade I - full view of glottis  Placement verified by: chest auscultation and capnometry   Measured from: lips  ETT/EBT  to lips (cm): 21  Number of attempts at approach: 1  Assessment: lips, teeth, and gum same as pre-op and atraumatic intubation    Additional Comments  Atraumatic insertion

## 2020-03-23 NOTE — ANESTHESIA POSTPROCEDURE EVALUATION
"Patient: Jannet Fierro    Procedure Summary     Date:  03/23/20 Room / Location:  [facility] OR  / [facility] MAIN OR    Anesthesia Start:  1024 Anesthesia Stop:  1317    Procedure:  Cervical 4 to cervical 5 and cervical 5 to cervical 6 anterior cervical discectomy, fusion and instrumentation (N/A Spine Cervical) Diagnosis:       Cervical radiculitis      (Cervical radiculitis [M54.12])    Surgeon:  Segundo Thomas MD Provider:  Rafael Lester MD    Anesthesia Type:  general ASA Status:  3          Anesthesia Type: general    Vitals  Vitals Value Taken Time   /75 3/23/2020  3:30 PM   Temp 37.3 °C (99.1 °F) 3/23/2020  1:11 PM   Pulse 128 3/23/2020  3:33 PM   Resp 14 3/23/2020  3:00 PM   SpO2 96 % 3/23/2020  3:33 PM   Vitals shown include unvalidated device data.        Post Anesthesia Care and Evaluation    Patient location during evaluation: bedside  Patient participation: complete - patient participated  Level of consciousness: awake  Pain management: adequate  Airway patency: patent  Anesthetic complications: No anesthetic complications    Cardiovascular status: acceptable  Respiratory status: acceptable  Hydration status: acceptable    Comments: /80   Pulse (!) 124   Temp 37.3 °C (99.1 °F) (Oral)   Resp 14   Ht 162.6 cm (64\")   Wt 116 kg (256 lb 3.2 oz)   LMP  (LMP Unknown)   SpO2 95%   BMI 43.98 kg/m²         "

## 2020-03-23 NOTE — BRIEF OP NOTE
CERVICAL DISCECTOMY ANTERIOR FUSION WITH INSTRUMENTATION  Progress Note    Jannet Fierro  3/23/2020    Pre-op Diagnosis:   Cervical radiculitis [M54.12]       Post-Op Diagnosis Codes:     * Cervical radiculitis [M54.12]    Procedure/CPT® Codes:      Procedure(s):  Cervical 4 to cervical 5 and cervical 5 to cervical 6 anterior cervical discectomy, fusion and instrumentation    Surgeon(s):  Segundo Thomas MD    Anesthesia: General    Staff:   Circulator: Guera De Santiago RN; Catarina Vallecillo RN  Scrub Person: Ayden Joyce Maurie  Vendor Representative: Elvin Poole  Assistant: Jacque Nair CSA    Estimated Blood Loss: 500 mL    Urine Voided: * No values recorded between 3/23/2020 10:23 AM and 3/23/2020 12:49 PM *    Specimens:                None          Drains:   Closed/Suction Drain 1 Anterior;Right Neck Bulb 10 Fr. (Active)       Findings: stenosis    Complications: none      Segundo Thomas MD     Date: 3/23/2020  Time: 12:54      
no

## 2020-03-24 ENCOUNTER — APPOINTMENT (OUTPATIENT)
Dept: GENERAL RADIOLOGY | Facility: HOSPITAL | Age: 65
End: 2020-03-24

## 2020-03-24 ENCOUNTER — TELEPHONE (OUTPATIENT)
Dept: NEUROSURGERY | Facility: CLINIC | Age: 65
End: 2020-03-24

## 2020-03-24 DIAGNOSIS — M54.12 CERVICAL RADICULITIS: Primary | ICD-10-CM

## 2020-03-24 LAB
BASOPHILS # BLD AUTO: 0.02 10*3/MM3 (ref 0–0.2)
BASOPHILS NFR BLD AUTO: 0.2 % (ref 0–1.5)
DEPRECATED RDW RBC AUTO: 41.9 FL (ref 37–54)
EOSINOPHIL # BLD AUTO: 0 10*3/MM3 (ref 0–0.4)
EOSINOPHIL NFR BLD AUTO: 0 % (ref 0.3–6.2)
ERYTHROCYTE [DISTWIDTH] IN BLOOD BY AUTOMATED COUNT: 13 % (ref 12.3–15.4)
HCT VFR BLD AUTO: 37.7 % (ref 34–46.6)
HGB BLD-MCNC: 12.6 G/DL (ref 12–15.9)
IMM GRANULOCYTES # BLD AUTO: 0.05 10*3/MM3 (ref 0–0.05)
IMM GRANULOCYTES NFR BLD AUTO: 0.5 % (ref 0–0.5)
LYMPHOCYTES # BLD AUTO: 0.63 10*3/MM3 (ref 0.7–3.1)
LYMPHOCYTES NFR BLD AUTO: 6.2 % (ref 19.6–45.3)
MCH RBC QN AUTO: 29.9 PG (ref 26.6–33)
MCHC RBC AUTO-ENTMCNC: 33.4 G/DL (ref 31.5–35.7)
MCV RBC AUTO: 89.5 FL (ref 79–97)
MONOCYTES # BLD AUTO: 0.82 10*3/MM3 (ref 0.1–0.9)
MONOCYTES NFR BLD AUTO: 8.1 % (ref 5–12)
NEUTROPHILS # BLD AUTO: 8.65 10*3/MM3 (ref 1.7–7)
NEUTROPHILS NFR BLD AUTO: 85 % (ref 42.7–76)
NRBC BLD AUTO-RTO: 0 /100 WBC (ref 0–0.2)
PLATELET # BLD AUTO: 197 10*3/MM3 (ref 140–450)
PMV BLD AUTO: 9.7 FL (ref 6–12)
RBC # BLD AUTO: 4.21 10*6/MM3 (ref 3.77–5.28)
WBC NRBC COR # BLD: 10.17 10*3/MM3 (ref 3.4–10.8)

## 2020-03-24 PROCEDURE — 25010000002 MORPHINE PER 10 MG: Performed by: NEUROLOGICAL SURGERY

## 2020-03-24 PROCEDURE — 25010000003 HYDROCORTISONE SOD SUCCINATE PF 250 MG RECONSTITUTED SOLUTION: Performed by: NURSE PRACTITIONER

## 2020-03-24 PROCEDURE — 85025 COMPLETE CBC W/AUTO DIFF WBC: CPT | Performed by: NEUROLOGICAL SURGERY

## 2020-03-24 PROCEDURE — 94799 UNLISTED PULMONARY SVC/PX: CPT

## 2020-03-24 PROCEDURE — 25010000002 METHYLPREDNISOLONE PER 125 MG: Performed by: NURSE PRACTITIONER

## 2020-03-24 PROCEDURE — 99024 POSTOP FOLLOW-UP VISIT: CPT | Performed by: NURSE PRACTITIONER

## 2020-03-24 PROCEDURE — 72040 X-RAY EXAM NECK SPINE 2-3 VW: CPT

## 2020-03-24 PROCEDURE — 25010000003 CEFAZOLIN IN DEXTROSE 2-4 GM/100ML-% SOLUTION: Performed by: NEUROLOGICAL SURGERY

## 2020-03-24 PROCEDURE — 94640 AIRWAY INHALATION TREATMENT: CPT

## 2020-03-24 PROCEDURE — 25810000003 SODIUM CHLORIDE 0.9 % WITH KCL 20 MEQ 20-0.9 MEQ/L-% SOLUTION: Performed by: NEUROLOGICAL SURGERY

## 2020-03-24 RX ORDER — METHYLPREDNISOLONE 4 MG/1
4 TABLET ORAL
Status: ACTIVE | OUTPATIENT
Start: 2020-03-25 | End: 2020-03-26

## 2020-03-24 RX ORDER — METHYLPREDNISOLONE 4 MG/1
4 TABLET ORAL
Status: DISCONTINUED | OUTPATIENT
Start: 2020-03-27 | End: 2020-03-26 | Stop reason: HOSPADM

## 2020-03-24 RX ORDER — METHYLPREDNISOLONE 4 MG/1
4 TABLET ORAL
Status: COMPLETED | OUTPATIENT
Start: 2020-03-25 | End: 2020-03-25

## 2020-03-24 RX ORDER — METHYLPREDNISOLONE 4 MG/1
4 TABLET ORAL
Status: DISCONTINUED | OUTPATIENT
Start: 2020-03-28 | End: 2020-03-24

## 2020-03-24 RX ORDER — METHYLPREDNISOLONE 4 MG/1
4 TABLET ORAL
Status: DISCONTINUED | OUTPATIENT
Start: 2020-03-27 | End: 2020-03-24

## 2020-03-24 RX ORDER — METHYLPREDNISOLONE 4 MG/1
4 TABLET ORAL
Status: DISCONTINUED | OUTPATIENT
Start: 2020-03-29 | End: 2020-03-24

## 2020-03-24 RX ORDER — METHYLPREDNISOLONE 4 MG/1
8 TABLET ORAL
Status: DISCONTINUED | OUTPATIENT
Start: 2020-03-25 | End: 2020-03-24

## 2020-03-24 RX ORDER — METHYLPREDNISOLONE 4 MG/1
8 TABLET ORAL
Status: DISCONTINUED | OUTPATIENT
Start: 2020-03-24 | End: 2020-03-24

## 2020-03-24 RX ORDER — METHYLPREDNISOLONE 4 MG/1
8 TABLET ORAL
Status: COMPLETED | OUTPATIENT
Start: 2020-03-24 | End: 2020-03-25

## 2020-03-24 RX ORDER — METHYLPREDNISOLONE 4 MG/1
8 TABLET ORAL
Status: DISCONTINUED | OUTPATIENT
Start: 2020-03-26 | End: 2020-03-26 | Stop reason: HOSPADM

## 2020-03-24 RX ORDER — METHYLPREDNISOLONE 4 MG/1
4 TABLET ORAL
Status: DISCONTINUED | OUTPATIENT
Start: 2020-03-29 | End: 2020-03-26 | Stop reason: HOSPADM

## 2020-03-24 RX ORDER — METHYLPREDNISOLONE 4 MG/1
4 TABLET ORAL
Status: COMPLETED | OUTPATIENT
Start: 2020-03-24 | End: 2020-03-25

## 2020-03-24 RX ORDER — METHYLPREDNISOLONE 4 MG/1
8 TABLET ORAL
Status: COMPLETED | OUTPATIENT
Start: 2020-03-25 | End: 2020-03-25

## 2020-03-24 RX ORDER — METHYLPREDNISOLONE 4 MG/1
4 TABLET ORAL
Status: DISCONTINUED | OUTPATIENT
Start: 2020-03-26 | End: 2020-03-24

## 2020-03-24 RX ORDER — METHYLPREDNISOLONE 4 MG/1
4 TABLET ORAL
Status: DISCONTINUED | OUTPATIENT
Start: 2020-03-24 | End: 2020-03-24

## 2020-03-24 RX ORDER — METHYLPREDNISOLONE 4 MG/1
4 TABLET ORAL
Status: DISCONTINUED | OUTPATIENT
Start: 2020-03-28 | End: 2020-03-26 | Stop reason: HOSPADM

## 2020-03-24 RX ORDER — METHYLPREDNISOLONE SODIUM SUCCINATE 125 MG/2ML
125 INJECTION, POWDER, LYOPHILIZED, FOR SOLUTION INTRAMUSCULAR; INTRAVENOUS ONCE
Status: COMPLETED | OUTPATIENT
Start: 2020-03-24 | End: 2020-03-24

## 2020-03-24 RX ORDER — METHYLPREDNISOLONE 4 MG/1
4 TABLET ORAL
Status: DISCONTINUED | OUTPATIENT
Start: 2020-03-30 | End: 2020-03-26 | Stop reason: HOSPADM

## 2020-03-24 RX ADMIN — CEFAZOLIN SODIUM 2 G: 2 INJECTION, SOLUTION INTRAVENOUS at 02:34

## 2020-03-24 RX ADMIN — HYDROCODONE BITARTRATE AND ACETAMINOPHEN 1 TABLET: 5; 325 TABLET ORAL at 19:25

## 2020-03-24 RX ADMIN — HYDROCORTISONE SODIUM SUCCINATE 250 MG: 250 INJECTION, POWDER, FOR SOLUTION INTRAMUSCULAR; INTRAVENOUS at 20:04

## 2020-03-24 RX ADMIN — SODIUM CHLORIDE, PRESERVATIVE FREE 3 ML: 5 INJECTION INTRAVENOUS at 20:04

## 2020-03-24 RX ADMIN — ASPIRIN 81 MG: 81 TABLET, COATED ORAL at 08:37

## 2020-03-24 RX ADMIN — HYDROCODONE BITARTRATE AND ACETAMINOPHEN 1 TABLET: 5; 325 TABLET ORAL at 08:37

## 2020-03-24 RX ADMIN — HYDROCODONE BITARTRATE AND ACETAMINOPHEN 1 TABLET: 5; 325 TABLET ORAL at 13:04

## 2020-03-24 RX ADMIN — METHYLPREDNISOLONE SODIUM SUCCINATE 125 MG: 125 INJECTION, POWDER, FOR SOLUTION INTRAMUSCULAR; INTRAVENOUS at 11:38

## 2020-03-24 RX ADMIN — DULOXETINE HYDROCHLORIDE 60 MG: 60 CAPSULE, DELAYED RELEASE ORAL at 08:37

## 2020-03-24 RX ADMIN — HYDROCODONE BITARTRATE AND ACETAMINOPHEN 1 TABLET: 5; 325 TABLET ORAL at 00:05

## 2020-03-24 RX ADMIN — HYDROCODONE BITARTRATE AND ACETAMINOPHEN 1 TABLET: 5; 325 TABLET ORAL at 04:26

## 2020-03-24 RX ADMIN — IPRATROPIUM BROMIDE AND ALBUTEROL SULFATE 3 ML: 2.5; .5 SOLUTION RESPIRATORY (INHALATION) at 15:16

## 2020-03-24 RX ADMIN — SODIUM CHLORIDE, PRESERVATIVE FREE 3 ML: 5 INJECTION INTRAVENOUS at 08:37

## 2020-03-24 RX ADMIN — MORPHINE SULFATE 2 MG: 2 INJECTION, SOLUTION INTRAMUSCULAR; INTRAVENOUS at 05:40

## 2020-03-24 RX ADMIN — IPRATROPIUM BROMIDE AND ALBUTEROL SULFATE 3 ML: 2.5; .5 SOLUTION RESPIRATORY (INHALATION) at 08:15

## 2020-03-24 RX ADMIN — IPRATROPIUM BROMIDE AND ALBUTEROL SULFATE 3 ML: 2.5; .5 SOLUTION RESPIRATORY (INHALATION) at 21:36

## 2020-03-24 RX ADMIN — POTASSIUM CHLORIDE AND SODIUM CHLORIDE 100 ML/HR: 900; 150 INJECTION, SOLUTION INTRAVENOUS at 02:34

## 2020-03-24 NOTE — PROGRESS NOTES
[facility] NEUROSURG[facility] PROGRESS NOTE      CC: POD 1 C4-6 ACDF      Subjective     Interval History:  Had some difficulty with exhalation overnight- describes as snoring sound- better after popsicle. Was using up to 4L NC but denies SOA. Overall feeling better this AM. Now on RA. Arm pain and weakness better.    ROS:  Constitutional: No fever, chills  Neck: neck pain  Resp: no SOA  GI: mild swallow difficulties  Neuro: No numbness, tingling, or weakness,  no balance difficulties  : no difficulty voiding, no incontinence    Objective     Vital signs in last 24 hours:  Temp:  [97.8 °F (36.6 °C)-99.1 °F (37.3 °C)] 98 °F (36.7 °C)  Heart Rate:  [101-149] 101  Resp:  [14-20] 18  BP: (128-183)/() 129/71    Intake/Output this shift:  I/O this shift:  In: 300 [I.V.:300]  Out: -     ANTOINETTE-100 cc since placement, only 40 cc x 12 hours    LABS:  Results from last 7 days   Lab Units 03/24/20  0412 03/19/20  1331   WBC 10*3/mm3 10.17 7.39   HEMOGLOBIN g/dL 12.6 15.3   HEMATOCRIT % 37.7 45.6   PLATELETS 10*3/mm3 197 202       IMAGING STUDIES:  Cervical x-rays reveal postoperative changes C4-6 with instrumentation well-positioned.  There is soft tissue swelling present anterior to hardware behind C2-6    I personally viewed and interpreted the patient's cervical x-rays.  Also reviewed by and discussed with Dr. Thomas.    Meds reviewed/changed: Yes    Current Facility-Administered Medications:   •  acetaminophen (TYLENOL) tablet 1,000 mg, 1,000 mg, Oral, Q6H PRN, Segundo Thomas MD  •  DULoxetine (CYMBALTA) DR capsule 60 mg, 60 mg, Oral, Daily, Segundo Thomas MD, 60 mg at 03/24/20 0837  •  HYDROcodone-acetaminophen (NORCO) 5-325 MG per tablet 1 tablet, 1 tablet, Oral, Q4H PRN, Segundo Thomas MD, 1 tablet at 03/24/20 0837  •  hydrocortisone sod succinate PF (Solu-CORTEF) injection 250 mg, 250 mg, Intravenous, Q6H, Brie Lewis APRN  •  ipratropium-albuterol (DUO-NEB) nebulizer solution 3 mL, 3 mL, Nebulization, 4x  Daily - RT, Rafael Lester MD, 3 mL at 03/24/20 0815  •  methylPREDNISolone (MEDROL (AARON)) tablet 4 mg, 4 mg, Oral, After Lunch, Brie Lewis APRN  •  methylPREDNISolone (MEDROL (AARON)) tablet 4 mg, 4 mg, Oral, After Dinner, Brie Lewis APRN  •  [START ON 3/25/2020] methylPREDNISolone (MEDROL (AARON)) tablet 4 mg, 4 mg, Oral, TID Around Food, Brie Lewis APRN  •  [START ON 3/26/2020] methylPREDNISolone (MEDROL (AARON)) tablet 4 mg, 4 mg, Oral, 4x Daily Taper, Brie Lewis APRN  •  [START ON 3/27/2020] methylPREDNISolone (MEDROL (AARON)) tablet 4 mg, 4 mg, Oral, TID Around Food, Brie Lewis APRN  •  [START ON 3/28/2020] methylPREDNISolone (MEDROL (AARON)) tablet 4 mg, 4 mg, Oral, Before Breakfast, Brie Lewis APRN  •  [START ON 3/28/2020] methylPREDNISolone (MEDROL (AARON)) tablet 4 mg, 4 mg, Oral, Tonight, Brie Lewis APRN  •  [START ON 3/29/2020] methylPREDNISolone (MEDROL (AARON)) tablet 4 mg, 4 mg, Oral, Before Breakfast, Brie Lewis APRN  •  methylPREDNISolone (MEDROL (AARON)) tablet 8 mg, 8 mg, Oral, Before Breakfast, Brie Lewis APRN  •  methylPREDNISolone (MEDROL (AARON)) tablet 8 mg, 8 mg, Oral, Tonight, Brie Lewis APRN  •  [START ON 3/25/2020] methylPREDNISolone (MEDROL (AARON)) tablet 8 mg, 8 mg, Oral, Joshua Brody Kelley R, APRN  •  morphine injection 2 mg, 2 mg, Intravenous, Q4H PRN, 2 mg at 03/24/20 0540 **AND** naloxone (NARCAN) injection 0.4 mg, 0.4 mg, Intravenous, Q5 Min PRN, Segundo Thomas MD  •  ondansetron (ZOFRAN) tablet 4 mg, 4 mg, Oral, Q6H PRN **OR** ondansetron (ZOFRAN) injection 4 mg, 4 mg, Intravenous, Q6H PRN, Segundo Thomas MD  •  promethazine (PHENERGAN) tablet 12.5 mg, 12.5 mg, Oral, Q6H PRN, Segundo Thomas MD  •  sodium chloride 0.9 % flush 10 mL, 10 mL, Intravenous, PRN, Segundo Thomas MD  •  sodium chloride 0.9 % flush 3 mL, 3 mL, Intravenous, Q12H, Segundo Thomas MD, 3 mL at 03/24/20 0837      Physical  "Exam:    General:   Awake, alert, oriented x3. Speech clear with no aphasia; up in chair  Neck:    [place] J collar On ; ROM deferred; swallow/trachea midline;      incision right anterior well approximated with no redness drainage or significant swelling.  ANTOINETTE drain to bulb suction with serosanguineous output.  Motor: Mild 4+/5 right deltoid weakness otherwise 5/5 bilateral upper extremities  Sensation: Normal to light touch    Assessment/Plan     ASSESSMENT:      Cervical radiculitis      PLAN: Patient reports some difficulty with expiration overnight that has resolved.  She was on 4 L O2, but when I evaluated she was down to room air and sats anywhere between 92 to 93%.  Incision looks good.  No significant hematoma.  Trachea midline.  She has slightly hoarse voice.  Cervical x-ray reveals postoperative changes.  There is some soft tissue swelling in the prevertebral area.  She reports significant improvement arm pain.  Strength is good with only minimal right deltoid weakness.  Pain is controlled with oral medications.  Intention was to discharge home, but she had a second episode of expiratory difficulties this afternoon.  We will give 24 hours IV steroid and transition to Medrol Dosepak.  Will keep patient in hospital for observation tonight.  Discussed with Dr. Thomas.    I discussed the patients findings and my recommendations with patient, nursing staff and Dr. Thomas       LOS: 0 days       Brie Lewis, APRN  3/24/2020  11:58    \"Dictated utilizing Dragon dictation\".      "

## 2020-03-24 NOTE — PLAN OF CARE
From PACU after cervical surgery. Complaints of posterior neck pain, medicated with IV meds and ice applied. Decreased in complaints of pain. Tolerating clear liquids. [place] J collar in place. Dressing clean, dry and intact. ANTOINETTE drain in place with serosanginous drng.

## 2020-03-24 NOTE — TELEPHONE ENCOUNTER
----- Message from PATRIZIA Jeffrey sent at 3/24/2020  1:16 PM EDT -----  Regarding: PO visit  Can you reschedule her postop visit from a PC to Dr. Thomas in 2 weeks?  She also needs cervical AP/lateral x-rays same day.  Thanks

## 2020-03-24 NOTE — PLAN OF CARE
Problem: Patient Care Overview  Goal: Plan of Care Review  Outcome: Ongoing (interventions implemented as appropriate)  Flowsheets  Taken 3/24/2020 0306  Progress: improving  Outcome Summary: VSS. Ambulating with an assist x 1. Voiding function intact. Pain has been maanged with PO meds. Heart rate tachy, otherwise VSS. Plan is to D/C today if stable. Pt was complaining of tightness when exhaling. Ice chips and popsicles were given and humidified O2 added to nasal cannula. A call was placed to MD. Will continue to monitor.   Taken 3/23/2020 2007  Plan of Care Reviewed With: patient

## 2020-03-25 PROBLEM — R00.0 TACHYCARDIA: Status: ACTIVE | Noted: 2020-03-25

## 2020-03-25 PROBLEM — G47.30 SLEEP APNEA: Status: ACTIVE | Noted: 2020-03-25

## 2020-03-25 LAB
BASOPHILS # BLD AUTO: 0.04 10*3/MM3 (ref 0–0.2)
BASOPHILS NFR BLD AUTO: 0.3 % (ref 0–1.5)
DEPRECATED RDW RBC AUTO: 43.2 FL (ref 37–54)
EOSINOPHIL # BLD AUTO: 0 10*3/MM3 (ref 0–0.4)
EOSINOPHIL NFR BLD AUTO: 0 % (ref 0.3–6.2)
ERYTHROCYTE [DISTWIDTH] IN BLOOD BY AUTOMATED COUNT: 13.1 % (ref 12.3–15.4)
HCT VFR BLD AUTO: 39.8 % (ref 34–46.6)
HGB BLD-MCNC: 13.3 G/DL (ref 12–15.9)
IMM GRANULOCYTES # BLD AUTO: 0.15 10*3/MM3 (ref 0–0.05)
IMM GRANULOCYTES NFR BLD AUTO: 1.2 % (ref 0–0.5)
LYMPHOCYTES # BLD AUTO: 0.76 10*3/MM3 (ref 0.7–3.1)
LYMPHOCYTES NFR BLD AUTO: 5.9 % (ref 19.6–45.3)
MCH RBC QN AUTO: 30 PG (ref 26.6–33)
MCHC RBC AUTO-ENTMCNC: 33.4 G/DL (ref 31.5–35.7)
MCV RBC AUTO: 89.8 FL (ref 79–97)
MONOCYTES # BLD AUTO: 1.16 10*3/MM3 (ref 0.1–0.9)
MONOCYTES NFR BLD AUTO: 8.9 % (ref 5–12)
NEUTROPHILS # BLD AUTO: 10.88 10*3/MM3 (ref 1.7–7)
NEUTROPHILS NFR BLD AUTO: 83.7 % (ref 42.7–76)
NRBC BLD AUTO-RTO: 0 /100 WBC (ref 0–0.2)
PLATELET # BLD AUTO: 207 10*3/MM3 (ref 140–450)
PMV BLD AUTO: 9.7 FL (ref 6–12)
RBC # BLD AUTO: 4.43 10*6/MM3 (ref 3.77–5.28)
T4 FREE SERPL-MCNC: 1.25 NG/DL (ref 0.93–1.7)
TSH SERPL DL<=0.05 MIU/L-ACNC: 0.53 UIU/ML (ref 0.27–4.2)
WBC NRBC COR # BLD: 12.99 10*3/MM3 (ref 3.4–10.8)

## 2020-03-25 PROCEDURE — 94799 UNLISTED PULMONARY SVC/PX: CPT

## 2020-03-25 PROCEDURE — 84439 ASSAY OF FREE THYROXINE: CPT | Performed by: HOSPITALIST

## 2020-03-25 PROCEDURE — 93005 ELECTROCARDIOGRAM TRACING: CPT | Performed by: NEUROLOGICAL SURGERY

## 2020-03-25 PROCEDURE — 99024 POSTOP FOLLOW-UP VISIT: CPT | Performed by: NURSE PRACTITIONER

## 2020-03-25 PROCEDURE — 63710000001 METHYLPREDNISOLONE 4 MG TABLET THERAPY PACK 21 EACH DISP PACK: Performed by: NURSE PRACTITIONER

## 2020-03-25 PROCEDURE — 93010 ELECTROCARDIOGRAM REPORT: CPT | Performed by: INTERNAL MEDICINE

## 2020-03-25 PROCEDURE — 25010000003 HYDROCORTISONE SOD SUCCINATE PF 250 MG RECONSTITUTED SOLUTION: Performed by: NURSE PRACTITIONER

## 2020-03-25 PROCEDURE — 84443 ASSAY THYROID STIM HORMONE: CPT | Performed by: HOSPITALIST

## 2020-03-25 PROCEDURE — 85025 COMPLETE CBC W/AUTO DIFF WBC: CPT | Performed by: NURSE PRACTITIONER

## 2020-03-25 PROCEDURE — 63710000001 METHYLPREDNISOLONE 4 MG TABLET THERAPY PACK 21 EACH DISP PACK: Performed by: NEUROLOGICAL SURGERY

## 2020-03-25 RX ORDER — POLYETHYLENE GLYCOL 3350 17 G/17G
17 POWDER, FOR SOLUTION ORAL DAILY
Status: DISCONTINUED | OUTPATIENT
Start: 2020-03-25 | End: 2020-03-26 | Stop reason: HOSPADM

## 2020-03-25 RX ADMIN — SODIUM CHLORIDE, PRESERVATIVE FREE 3 ML: 5 INJECTION INTRAVENOUS at 09:27

## 2020-03-25 RX ADMIN — IPRATROPIUM BROMIDE AND ALBUTEROL SULFATE 3 ML: 2.5; .5 SOLUTION RESPIRATORY (INHALATION) at 15:09

## 2020-03-25 RX ADMIN — METOPROLOL TARTRATE 25 MG: 25 TABLET ORAL at 20:38

## 2020-03-25 RX ADMIN — METHYLPREDNISOLONE 4 MG: 4 TABLET ORAL at 18:51

## 2020-03-25 RX ADMIN — DULOXETINE HYDROCHLORIDE 60 MG: 60 CAPSULE, DELAYED RELEASE ORAL at 09:26

## 2020-03-25 RX ADMIN — POLYETHYLENE GLYCOL 3350 17 G: 17 POWDER, FOR SOLUTION ORAL at 16:42

## 2020-03-25 RX ADMIN — METHYLPREDNISOLONE 4 MG: 4 TABLET ORAL at 12:51

## 2020-03-25 RX ADMIN — HYDROCODONE BITARTRATE AND ACETAMINOPHEN 1 TABLET: 5; 325 TABLET ORAL at 05:54

## 2020-03-25 RX ADMIN — IPRATROPIUM BROMIDE AND ALBUTEROL SULFATE 3 ML: 2.5; .5 SOLUTION RESPIRATORY (INHALATION) at 07:57

## 2020-03-25 RX ADMIN — HYDROCODONE BITARTRATE AND ACETAMINOPHEN 1 TABLET: 5; 325 TABLET ORAL at 02:09

## 2020-03-25 RX ADMIN — METHYLPREDNISOLONE 8 MG: 4 TABLET ORAL at 09:27

## 2020-03-25 RX ADMIN — METHYLPREDNISOLONE 8 MG: 4 TABLET ORAL at 20:39

## 2020-03-25 RX ADMIN — HYDROCORTISONE SODIUM SUCCINATE 250 MG: 250 INJECTION, POWDER, FOR SOLUTION INTRAMUSCULAR; INTRAVENOUS at 02:09

## 2020-03-25 RX ADMIN — METOPROLOL TARTRATE 25 MG: 25 TABLET ORAL at 12:51

## 2020-03-25 RX ADMIN — METHYLPREDNISOLONE 4 MG: 4 TABLET ORAL at 09:26

## 2020-03-25 RX ADMIN — IPRATROPIUM BROMIDE AND ALBUTEROL SULFATE 3 ML: 2.5; .5 SOLUTION RESPIRATORY (INHALATION) at 19:42

## 2020-03-25 NOTE — CONSULTS
CONSULT NOTE    INTERNAL MEDICINE   [facility]       Patient Identification:  Name: Jannet Fierro  Age: 64 y.o.  Sex: female  :  1955  MRN: [id number]             Date of Consultation: 3/25/2020        Primary Care Physician: Sandra Roe MD                               Requesting Physician: Dr Thomas  Reason for Consultation:tachycardia    Chief Complaint:  Neck pain    History of Present Illness:          The patient is a 64-year-old white female with history of cervical disc disease with cervical spine surgery this admission for decompression, history of back pain, kidney stones, history of goiter, and sleep apnea who has underwent cervical spine surgery for decompression and sad good results with improvement of her strength and discomfort in her arms.  Medicine was asked to see postoperatively because patient was having some tachycardia.  The patient denied having any chest pain or shortness of air.  She is been started on some steroids.  Medicine was asked to see for further evaluation of her tachycardia.  She was started on a beta-blocker earlier after some discussion with nurse.      Past Medical History:  Past Medical History:   Diagnosis Date   • Back pain    • Cervical radiculitis 2020   • Chronic kidney disease     stones   • Chronic midline low back pain without sciatica 2017   • Compression fracture of first lumbar vertebra (CMS/HCC)      history   • Hard to intubate     be careful pt states she is to tell she has a small mouth   • Mild single current episode of major depressive disorder (CMS/HCC) 2017   • Neck pain    • Sleep apnea     cpap     Past Surgical History:  Past Surgical History:   Procedure Laterality Date   • ANKLE SURGERY Right     hardware   • ANTERIOR CERVICAL DISCECTOMY W/ FUSION N/A 3/23/2020    Procedure: Cervical 4 to cervical 5 and cervical 5 to cervical 6 anterior cervical discectomy, fusion and instrumentation;  Surgeon: William  "Segundo JOHNSON MD;  Location: [facility] OR;  Service: Neurosurgery;  Laterality: N/A;   • KIDNEY STONE SURGERY     • KNEE SURGERY Right     hardware    torn acl & tendons and ligaments with pin and \"bungee cord\"      Home Meds:  Medications Prior to Admission   Medication Sig Dispense Refill Last Dose   • Chlorhexidine Gluconate Cloth 2 % pads Apply 1 application topically Take As Directed. USE AS DIRECTED PREOP    3/23/2020 at 0615   • DULoxetine (CYMBALTA) 60 MG capsule TAKE 1 CAPSULE BY MOUTH DAILY 90 capsule 1 3/23/2020 at 0630   • acetaminophen (TYLENOL) 500 MG tablet Take 1,000 mg by mouth Every 6 (Six) Hours As Needed for Mild Pain .   More than a month at Unknown time   • ASPIRIN LOW DOSE 81 MG EC tablet TAKE 1 TABLET BY MOUTH EVERY DAY (Patient taking differently: Take 81 mg by mouth Daily.) 90 tablet 0 3/17/2020   • nabumetone (RELAFEN) 750 MG tablet Take 750 mg by mouth 2 (Two) Times a Day.   3/9/2020     Current Meds:     Current Facility-Administered Medications:   •  acetaminophen (TYLENOL) tablet 1,000 mg, 1,000 mg, Oral, Q6H PRN, Segundo Thomas MD  •  DULoxetine (CYMBALTA) DR capsule 60 mg, 60 mg, Oral, Daily, Segundo Thomas MD, 60 mg at 03/25/20 0926  •  HYDROcodone-acetaminophen (NORCO) 5-325 MG per tablet 1 tablet, 1 tablet, Oral, Q4H PRN, Segundo Thomas MD, 1 tablet at 03/25/20 0554  •  ipratropium-albuterol (DUO-NEB) nebulizer solution 3 mL, 3 mL, Nebulization, 4x Daily - RT, Rafael Lester MD, 3 mL at 03/25/20 0757  •  methylPREDNISolone (MEDROL (AARON)) tablet 4 mg, 4 mg, Oral, TID Around Food, Brie Leiws, APRN, 4 mg at 03/25/20 0926  •  methylPREDNISolone (MEDROL (AARON)) tablet 4 mg, 4 mg, Oral, After Dinner, Segundo Thomas MD  •  [START ON 3/27/2020] methylPREDNISolone (MEDROL (AARON)) tablet 4 mg, 4 mg, Oral, 4x Daily Taper, Segundo Thomas MD  •  [START ON 3/28/2020] methylPREDNISolone (MEDROL (AARON)) tablet 4 mg, 4 mg, Oral, TID Around Food, Segundo Thomas MD  •  " [START ON 3/29/2020] methylPREDNISolone (MEDROL (AARON)) tablet 4 mg, 4 mg, Oral, Before Breakfast, Segundo Thomas MD  •  [START ON 3/29/2020] methylPREDNISolone (MEDROL (AARON)) tablet 4 mg, 4 mg, Oral, William Brody Steven J., MD  •  [START ON 3/30/2020] methylPREDNISolone (MEDROL (AARON)) tablet 4 mg, 4 mg, Oral, Before Breakfast, Segundo Thomas MD  •  methylPREDNISolone (MEDROL (AARON)) tablet 8 mg, 8 mg, Oral, William Brody Steven J., MD  •  [START ON 3/26/2020] methylPREDNISolone (MEDROL (AARON)) tablet 8 mg, 8 mg, Oral, William Brody Steven J., MD  •  metoprolol tartrate (LOPRESSOR) tablet 25 mg, 25 mg, Oral, BID, Feliciano Conteh MD, 25 mg at 03/25/20 1251  •  morphine injection 2 mg, 2 mg, Intravenous, Q4H PRN, 2 mg at 03/24/20 0540 **AND** naloxone (NARCAN) injection 0.4 mg, 0.4 mg, Intravenous, Q5 Min PRN, Segundo Thomas MD  •  ondansetron (ZOFRAN) tablet 4 mg, 4 mg, Oral, Q6H PRN **OR** ondansetron (ZOFRAN) injection 4 mg, 4 mg, Intravenous, Q6H PRN, Segundo Thomas MD  •  polyethylene glycol (MIRALAX) powder 17 g, 17 g, Oral, Daily, Feliciano Conteh MD  •  promethazine (PHENERGAN) tablet 12.5 mg, 12.5 mg, Oral, Q6H PRN, Segundo Thomas MD  •  sodium chloride 0.9 % flush 10 mL, 10 mL, Intravenous, PRN, Segundo Thomas MD  •  sodium chloride 0.9 % flush 3 mL, 3 mL, Intravenous, Q12H, Segundo Thomas MD, 3 mL at 03/25/20 0927  Allergies:  Allergies   Allergen Reactions   • Methocarbamol Itching     Social History:   Social History     Socioeconomic History   • Marital status:      Spouse name: Not on file   • Number of children: Not on file   • Years of education: Not on file   • Highest education level: Not on file   Tobacco Use   • Smoking status: Never Smoker   • Smokeless tobacco: Never Used   Substance and Sexual Activity   • Alcohol use: Yes     Comment: less than one drink once per month   • Drug use: Never   • Sexual activity: Defer     Family History:  Family History   Problem  "Relation Age of Onset   • Heart attack Mother 71   • Hypothyroidism Mother    • Hypertension Father    • Skin cancer Father 91   • Thyroid disease Daughter 31   • Malig Hyperthermia Neg Hx           Review of Systems  See history of present illness and past medical history.  Patient denies headache, dizziness, syncope, falls, trauma, change in vision, change in hearing, change in taste, changes in weight, changes in appetite, focal weakness, numbness, or paresthesia.  Patient denies chest pain, palpitations, dyspnea, orthopnea, PND, cough, sinus pressure, rhinorrhea, epistaxis, hemoptysis, nausea, vomiting,hematemesis, diarrhea, constipation or hematchezia.  Denies cold or heat intolerance, polydipsia, polyuria, polyphagia. Denies hematuria, pyuria, dysuria, hesitancy, frequency or urgency. Denies consumption of raw and under cooked meats foods or change in water source.  Denies fever, chills, sweats, night sweats.  Denies missing any routine medications. Remainder of ROS is negative.      Vitals:   /70 (BP Location: Right arm, Patient Position: Sitting)   Pulse 112   Temp 97.6 °F (36.4 °C) (Oral)   Resp 18   Ht 162.6 cm (64\")   Wt 116 kg (256 lb 3.2 oz)   LMP  (LMP Unknown)   SpO2 93%   BMI 43.98 kg/m²   I/O: No intake or output data in the 24 hours ending 03/25/20 1416  Exam:  General Appearance:    Alert, cooperative, no distress, appears stated age   Head:    Normocephalic, without obvious abnormality, atraumatic   Eyes:    PERRL, conjunctiva/corneas clear, EOM's intact, both eyes   Ears:    Normal external ear canals, both ears   Nose:   Nares normal, septum midline, mucosa normal, no drainage    or sinus tenderness   Throat:   Lips, tongue, gums normal; oral mucosa pink and moist   Neck:   Supple, symmetrical, trachea midline, no adenopathy;     thyroid:  no enlargement/tenderness/nodules; no carotid    bruit or JVD   Back:     Symmetric, no curvature, ROM normal, no CVA tenderness   Lungs:     " Clear to auscultation bilaterally, respirations unlabored   Chest Wall:    No tenderness or deformity    Heart:    Regular rate and rhythm, S1 and S2 normal, no murmur, rub   or gallop   Abdomen:     Soft, non-tender, bowel sounds active all four quadrants,     no masses, no hepatomegaly, no splenomegaly   Extremities:   Extremities normal, atraumatic, no cyanosis or edema   Pulses:   Pulses palpable in all extremities; symmetric all extremities   Skin:   Skin color normal, Skin is warm and dry,  no rashes or palpable lesions   Neurologic:   CNII-XII intact, motor strength grossly intact, sensation grossly intact to light touch, no focal deficits noted       Data Review:  WBC   Date Value Ref Range Status   03/25/2020 12.99 (H) 3.40 - 10.80 10*3/mm3 Final     Hemoglobin   Date Value Ref Range Status   03/25/2020 13.3 12.0 - 15.9 g/dL Final     Hematocrit   Date Value Ref Range Status   03/25/2020 39.8 34.0 - 46.6 % Final     Platelets   Date Value Ref Range Status   03/25/2020 207 140 - 450 10*3/mm3 Final     No results found for: NA, K, CL, CO2, BUN, CREATININE, GLUCOSE  No results found for: CALCIUM, MG, PHOS  No results found for: AST, ALT, ALKPHOS  No results found for: APTT, INR  No results found for: COLORU, CLARITYU, SPECGRAV, PHUR, PROTEINUR, GLUCOSEU, KETONESU, BLOODU, NITRITE, LEUKOCYTESUR, BILIRUBINUR, UROBILINOGEN, RBCUA, WBCUA, BACTERIA, UACOMMENT  No results found for: TROPONINT, TROPONINI, BNP  No components found for: HGBA1C;2  No components found for: TSH;2            Imaging Results (Last 7 Days)     Procedure Component Value Units Date/Time    XR Spine Cervical 2 or 3 View [[id number]] Collected:  03/24/20 0848     Updated:  03/24/20 1033    Narrative:       TWO OR 3 VIEWS CERVICAL SPINE 03/24/2020     CLINICAL HISTORY: Postop anterior cervical discectomy and fusion C4 to  C6, neck pain.     FINDINGS: AP and 3 separate lateral views of cervical spine are  submitted for interpretation. On x-ray  03/23/2020, patient had cervical  spine surgery with an anterior cervical discectomy and fusion C4 to C6.  There is an anterior plate extending from the anterior superior endplate  of C4 to the anterior inferior body of C6. There are screws fixating the  plate with 2 screws extending in the mid inferior body of C4, mid body  of C5 and mid body of C6. There are disc implants in the central to  anterior aspect of the C4-C5 and C5-C6 disc space. There is good  alignment of the hardware. A postoperative drain projects over the  prevertebral region of C5-C6. There is some prevertebral soft tissue  swelling, likely postoperative. There is mild disc space narrowing and  degenerative endplate changes C3-C4. There is moderate disc space  narrowing and degenerative endplate changes C6-C7.       Impression:       Status post anterior cervical discectomy and fusion procedure C4 to C6  with good alignment of the hardware, postoperative drains in place.  There is prevertebral soft tissue swelling from C2 to C6, felt to be  postoperative. There is mild disc space narrowing and degenerative  endplate changes C3-C4, mild-to-moderate disc space narrowing and  degenerative endplate changes C6-C7.     This report was finalized on 3/24/2020 10:29 AM by Dr. Alan Aragon M.D.       XR Spine Cervical 2 or 3 View [[id number]] Collected:  03/23/20 1306     Updated:  03/23/20 1311    Narrative:       XR SPINE CERVICAL 2 OR 3 VW-, FL C ARM DURING SURGERY-     INDICATIONS: Anterior cervical fusion     TECHNIQUE: FLUOROSCOPIC ASSISTANCE IN THE OPERATING ROOM.     FINDINGS:     4 intraoperative fluoroscopic spot views were obtained and recorded the  PACS for review, revealing anterior plate and screw fusion C4, C5, C6,  with intervertebral disc spacers. Please see operative report for full  details.     Fluoroscopy time: 4.6 seconds       Impression:          As described.     This report was finalized on 3/23/2020 1:08 PM by Dr. Ned SOSA  ELIAS Hauser.       FL C Arm During Surgery [[id number]] Collected:  03/23/20 1306     Updated:  03/23/20 1311    Narrative:       XR SPINE CERVICAL 2 OR 3 VW-, FL C ARM DURING SURGERY-     INDICATIONS: Anterior cervical fusion     TECHNIQUE: FLUOROSCOPIC ASSISTANCE IN THE OPERATING ROOM.     FINDINGS:     4 intraoperative fluoroscopic spot views were obtained and recorded the  PACS for review, revealing anterior plate and screw fusion C4, C5, C6,  with intervertebral disc spacers. Please see operative report for full  details.     Fluoroscopy time: 4.6 seconds       Impression:          As described.     This report was finalized on 3/23/2020 1:08 PM by Dr. Ned Hauser M.D.           Past Medical History:   Diagnosis Date   • Back pain    • Cervical radiculitis 2/11/2020   • Chronic kidney disease     stones   • Chronic midline low back pain without sciatica 11/29/2017   • Compression fracture of first lumbar vertebra (CMS/[facility])     2015 history   • Hard to intubate     be careful pt states she is to tell she has a small mouth   • Mild single current episode of major depressive disorder (CMS/[facility]) 11/29/2017   • Neck pain    • Sleep apnea     cpap       Assessment:  Active Hospital Problems    Diagnosis  POA   • **Cervical radiculitis [M54.12]  Unknown   • Sleep apnea [G47.30]  Unknown   • Tachycardia [R00.0]  Unknown   • Multiple thyroid nodules [E04.2]  Yes   • Chronic midline low back pain with bilateral sciatica [M54.41, M54.42, G89.29]  Yes      Resolved Hospital Problems   No resolved problems to display.       Plan:  The patient appears to be doing fairly well postoperatively but has some tachycardia.  I have ordered some thyroid function test and have started a beta-blocker.  I think we should watch her another night just as a precaution and will repeat some other labs in the morning.  If we do not find anything else and her tachycardia is better probably could go home tomorrow.  I suspect her  tachycardia may just be related to some postop pain and possibly the addition of some steroids.  Medicine will follow with you thank you for this interesting consult.    Feliciano Conteh MD   3/25/2020  14:16

## 2020-03-25 NOTE — PLAN OF CARE
Problem: Patient Care Overview  Goal: Plan of Care Review  Outcome: Ongoing (interventions implemented as appropriate)  Flowsheets (Taken 3/25/2020 9977)  Progress: improving  Plan of Care Reviewed With: patient  Outcome Summary: Transferred from . Patient is ambulating independently without difficulty, hard collar in place at all times. VSS, although patient with elevated heart rate, 120-130s earlier today. [facility] consulted for tachycardia and metropolol started with good response, hr now in low 100s on pulse ox. Pain is minimal and patient has not required any prn pain meds. Miralax started for c/o constipation, but voiding function is intact. Patient anticipates d/c home tomorrow if stable.

## 2020-03-25 NOTE — PROGRESS NOTES
[facility] NEUROSURGERY PROGRESS NOTE      CC: POD 2 C4-6 ACDF      Subjective     Interval History: Reports that expiration difficulties and swelling much better today after steroid.  Nurse reports patient has been tachycardic even at rest.  Patient denies shortness of air, chest pain.  States arm still feels good.  Tolerating diet, ambulating, voiding.    ROS:  Constitutional: No fever, chills  Neck: neck pain  Resp: no SOA  Cardiac: No chest pain  GI: mild swallow difficulties-improved  Neuro: No numbness, tingling, or weakness,  no balance difficulties  : no difficulty voiding, no incontinence    Objective     Vital signs in last 24 hours:  Temp:  [98.1 °F (36.7 °C)-98.6 °F (37 °C)] 98.3 °F (36.8 °C)  Heart Rate:  [100-140] 100  Resp:  [16-20] 18  BP: (128-161)/(64-84) 144/80    Intake/Output this shift:  No intake/output data recorded.    LABS:  Results from last 7 days   Lab Units 03/25/20  1001 03/24/20  0412 03/19/20  1331   WBC 10*3/mm3 12.99* 10.17 7.39   HEMOGLOBIN g/dL 13.3 12.6 15.3   HEMATOCRIT % 39.8 37.7 45.6   PLATELETS 10*3/mm3 207 197 202     EKG-sinus tachycardia    IMAGING STUDIES:  No new imaging    I personally viewed and interpreted the patient's chart.    Meds reviewed/changed: Yes    Current Facility-Administered Medications:   •  acetaminophen (TYLENOL) tablet 1,000 mg, 1,000 mg, Oral, Q6H PRN, Segundo Thomas MD  •  DULoxetine (CYMBALTA) DR capsule 60 mg, 60 mg, Oral, Daily, Segundo Thomas MD, 60 mg at 03/25/20 0926  •  HYDROcodone-acetaminophen (NORCO) 5-325 MG per tablet 1 tablet, 1 tablet, Oral, Q4H PRN, Segundo Thomas MD, 1 tablet at 03/25/20 0538  •  ipratropium-albuterol (DUO-NEB) nebulizer solution 3 mL, 3 mL, Nebulization, 4x Daily - RT, Rafael Lester MD, 3 mL at 03/25/20 0757  •  methylPREDNISolone (MEDROL (AARON)) tablet 4 mg, 4 mg, Oral, After Lunch, Brie Lewis APRN  •  methylPREDNISolone (MEDROL (AARON)) tablet 4 mg, 4 mg, Oral, TID Around Food, Joshua  Brie EGAN, APRN, 4 mg at 03/25/20 0926  •  methylPREDNISolone (MEDROL (AARON)) tablet 4 mg, 4 mg, Oral, After Dinner, Segundo Thomas MD  •  [START ON 3/27/2020] methylPREDNISolone (MEDROL (AARON)) tablet 4 mg, 4 mg, Oral, 4x Daily Taper, Segundo Thomas MD  •  [START ON 3/28/2020] methylPREDNISolone (MEDROL (AARON)) tablet 4 mg, 4 mg, Oral, TID Around Food, Segundo Thomas MD  •  [START ON 3/29/2020] methylPREDNISolone (MEDROL (AARON)) tablet 4 mg, 4 mg, Oral, Before Breakfast, Segundo Thomas MD  •  [START ON 3/29/2020] methylPREDNISolone (MEDROL (AARON)) tablet 4 mg, 4 mg, Oral, Tonight, Segundo Thomas MD  •  [START ON 3/30/2020] methylPREDNISolone (MEDROL (AARON)) tablet 4 mg, 4 mg, Oral, Before Breakfast, Segundo Thomas MD  •  methylPREDNISolone (MEDROL (AARON)) tablet 8 mg, 8 mg, Oral, TonightWilliam Steven J., MD  •  [START ON 3/26/2020] methylPREDNISolone (MEDROL (AARON)) tablet 8 mg, 8 mg, Oral, TonightWilliam Steven J., MD  •  metoprolol tartrate (LOPRESSOR) tablet 25 mg, 25 mg, Oral, BID, Feliciano Conteh MD  •  morphine injection 2 mg, 2 mg, Intravenous, Q4H PRN, 2 mg at 03/24/20 0540 **AND** naloxone (NARCAN) injection 0.4 mg, 0.4 mg, Intravenous, Q5 Min PRN, Segundo Thomas MD  •  ondansetron (ZOFRAN) tablet 4 mg, 4 mg, Oral, Q6H PRN **OR** ondansetron (ZOFRAN) injection 4 mg, 4 mg, Intravenous, Q6H PRN, Segundo Thomas MD  •  promethazine (PHENERGAN) tablet 12.5 mg, 12.5 mg, Oral, Q6H PRN, Segundo Thomas MD  •  sodium chloride 0.9 % flush 10 mL, 10 mL, Intravenous, PRN, Segundo Thomas MD  •  sodium chloride 0.9 % flush 3 mL, 3 mL, Intravenous, Q12H, Segundo Thomas MD, 3 mL at 03/25/20 0927      Physical Exam:    General:   Awake, alert, oriented x3. Speech mildly hoarse  Cardiac:   Regular rhythm, tachycardia  Respiratory:   Clear to auscultation, no accessory muscle use,     nonlabored  Neck: [place] collar On ; ROM deferred; swallow/trachea midline; incision right anterior well  "approximated with no redness drainage or significant swelling.    Motor: Mild 4+/5 right deltoid weakness otherwise 5/5 bilateral upper extremities  Sensation:   Normal to light touch      Assessment/Plan     ASSESSMENT:      Cervical radiculitis      PLAN: Patient overall states she feels well.  Swallowing and breathing better.  Has had some tachycardia up to 140s overnight.  This is even present at rest.  I observed her into the 120s and 130s at rest.  No chest pain or shortness of air.  Lungs clear.  EKG shows sinus tachycardia.  Baseline preop EKG shows heart rate in the 90s.  WBC elevated slightly, but initiated steroids yesterday.  She is afebrile.  Appears asymptomatic, but will ask [facility] to evaluate.  If they clear her, hopefully home later this afternoon with Medrol Dosepak.  Hard collar will be in place for 6 weeks to be off only for showers.    I discussed the patients findings and my recommendations with patient, nursing staff and Dr. Thomas       LOS: 0 days       Brie Lewis, APRN  3/25/2020  10:52    \"Dictated utilizing Dragon dictation\".      "

## 2020-03-25 NOTE — PLAN OF CARE
Problem: Patient Care Overview  Goal: Plan of Care Review  Outcome: Ongoing (interventions implemented as appropriate)  Flowsheets (Taken 3/25/2020 0423)  Progress: improving  Plan of Care Reviewed With: patient  Outcome Summary: VSS, Sinus tach on the monitor, on RA. Norco managing pain well, has on hard collar. Standby assist, sleeping in chair. Plan is to DC today.

## 2020-03-26 ENCOUNTER — TELEPHONE (OUTPATIENT)
Dept: NEUROSURGERY | Facility: CLINIC | Age: 65
End: 2020-03-26

## 2020-03-26 VITALS
HEIGHT: 64 IN | DIASTOLIC BLOOD PRESSURE: 80 MMHG | WEIGHT: 256.2 LBS | SYSTOLIC BLOOD PRESSURE: 138 MMHG | OXYGEN SATURATION: 99 % | TEMPERATURE: 97.9 F | RESPIRATION RATE: 20 BRPM | BODY MASS INDEX: 43.74 KG/M2 | HEART RATE: 104 BPM

## 2020-03-26 DIAGNOSIS — M54.12 CERVICAL RADICULITIS: Primary | ICD-10-CM

## 2020-03-26 LAB
ANION GAP SERPL CALCULATED.3IONS-SCNC: 11 MMOL/L (ref 5–15)
BASOPHILS # BLD AUTO: 0.02 10*3/MM3 (ref 0–0.2)
BASOPHILS NFR BLD AUTO: 0.2 % (ref 0–1.5)
BUN BLD-MCNC: 12 MG/DL (ref 8–23)
BUN/CREAT SERPL: 20.3 (ref 7–25)
CALCIUM SPEC-SCNC: 9 MG/DL (ref 8.6–10.5)
CHLORIDE SERPL-SCNC: 100 MMOL/L (ref 98–107)
CO2 SERPL-SCNC: 29 MMOL/L (ref 22–29)
CREAT BLD-MCNC: 0.59 MG/DL (ref 0.57–1)
DEPRECATED RDW RBC AUTO: 43.8 FL (ref 37–54)
EOSINOPHIL # BLD AUTO: 0.01 10*3/MM3 (ref 0–0.4)
EOSINOPHIL NFR BLD AUTO: 0.1 % (ref 0.3–6.2)
ERYTHROCYTE [DISTWIDTH] IN BLOOD BY AUTOMATED COUNT: 13.4 % (ref 12.3–15.4)
GFR SERPL CREATININE-BSD FRML MDRD: 103 ML/MIN/1.73
GLUCOSE BLD-MCNC: 98 MG/DL (ref 65–99)
HCT VFR BLD AUTO: 40.4 % (ref 34–46.6)
HGB BLD-MCNC: 13.3 G/DL (ref 12–15.9)
IMM GRANULOCYTES # BLD AUTO: 0.06 10*3/MM3 (ref 0–0.05)
IMM GRANULOCYTES NFR BLD AUTO: 0.5 % (ref 0–0.5)
LYMPHOCYTES # BLD AUTO: 1.43 10*3/MM3 (ref 0.7–3.1)
LYMPHOCYTES NFR BLD AUTO: 12.8 % (ref 19.6–45.3)
MCH RBC QN AUTO: 29.7 PG (ref 26.6–33)
MCHC RBC AUTO-ENTMCNC: 32.9 G/DL (ref 31.5–35.7)
MCV RBC AUTO: 90.2 FL (ref 79–97)
MONOCYTES # BLD AUTO: 0.92 10*3/MM3 (ref 0.1–0.9)
MONOCYTES NFR BLD AUTO: 8.3 % (ref 5–12)
NEUTROPHILS # BLD AUTO: 8.7 10*3/MM3 (ref 1.7–7)
NEUTROPHILS NFR BLD AUTO: 78.1 % (ref 42.7–76)
NRBC BLD AUTO-RTO: 0 /100 WBC (ref 0–0.2)
PLATELET # BLD AUTO: 177 10*3/MM3 (ref 140–450)
PMV BLD AUTO: 9.4 FL (ref 6–12)
POTASSIUM BLD-SCNC: 3.7 MMOL/L (ref 3.5–5.2)
RBC # BLD AUTO: 4.48 10*6/MM3 (ref 3.77–5.28)
SODIUM BLD-SCNC: 140 MMOL/L (ref 136–145)
WBC NRBC COR # BLD: 11.14 10*3/MM3 (ref 3.4–10.8)

## 2020-03-26 PROCEDURE — 99024 POSTOP FOLLOW-UP VISIT: CPT | Performed by: PHYSICIAN ASSISTANT

## 2020-03-26 PROCEDURE — 80048 BASIC METABOLIC PNL TOTAL CA: CPT | Performed by: HOSPITALIST

## 2020-03-26 PROCEDURE — 85025 COMPLETE CBC W/AUTO DIFF WBC: CPT | Performed by: HOSPITALIST

## 2020-03-26 PROCEDURE — 94799 UNLISTED PULMONARY SVC/PX: CPT

## 2020-03-26 RX ORDER — HYDROCODONE BITARTRATE AND ACETAMINOPHEN 5; 325 MG/1; MG/1
TABLET ORAL
Qty: 40 TABLET | Refills: 0 | Status: CANCELLED | OUTPATIENT
Start: 2020-03-26

## 2020-03-26 RX ORDER — HYDROCODONE BITARTRATE AND ACETAMINOPHEN 5; 325 MG/1; MG/1
1 TABLET ORAL EVERY 6 HOURS PRN
Qty: 35 TABLET | Refills: 0 | Status: SHIPPED | OUTPATIENT
Start: 2020-03-26 | End: 2020-04-27

## 2020-03-26 RX ORDER — OXYMETAZOLINE HYDROCHLORIDE 0.05 G/100ML
2 SPRAY NASAL 2 TIMES DAILY
Status: DISCONTINUED | OUTPATIENT
Start: 2020-03-26 | End: 2020-03-26 | Stop reason: HOSPADM

## 2020-03-26 RX ADMIN — SODIUM CHLORIDE, PRESERVATIVE FREE 3 ML: 5 INJECTION INTRAVENOUS at 10:18

## 2020-03-26 RX ADMIN — IPRATROPIUM BROMIDE AND ALBUTEROL SULFATE 3 ML: 2.5; .5 SOLUTION RESPIRATORY (INHALATION) at 08:24

## 2020-03-26 RX ADMIN — DULOXETINE HYDROCHLORIDE 60 MG: 60 CAPSULE, DELAYED RELEASE ORAL at 10:17

## 2020-03-26 RX ADMIN — POLYETHYLENE GLYCOL 3350 17 G: 17 POWDER, FOR SOLUTION ORAL at 10:17

## 2020-03-26 RX ADMIN — OXYMETAZOLINE HYDROCHLORIDE 2 SPRAY: 0.05 SPRAY NASAL at 10:17

## 2020-03-26 RX ADMIN — METOPROLOL TARTRATE 25 MG: 25 TABLET ORAL at 10:17

## 2020-03-26 NOTE — TELEPHONE ENCOUNTER
After Visit Summary   8/15/2017    Fernando Box    MRN: [id number]           Patient Information     Date Of Birth          2004        Visit Information        Provider Department      8/15/2017 10:30 AM Katie Rojo MD Peds Dermatology        Care Instructions    [facility]- Pediatric Dermatology  Dr. Katie Rojo, Dr. Lucie Glez, Dr. Gail Cueto, Dr. Monique Farias, Dr. Chucky Nolan       Pediatric Appointment Scheduling and Call Center [phone]     Non Urgent -Triage Voicemail Line; [phone]- Stephanie and Johana RN's. Messages are checked periodically throughout the day and are returned as soon as possible.      Clinic Fax number: [phone]    If you need a prescription refill, please contact your pharmacy. They will send us an electronic request. Refills are approved or denied by our Physicians during normal business hours, Monday through Fridays    Per office policy, refills will not be granted if you have not been seen within the past year (or sooner depending on your child's condition)    *Radiology Scheduling- [phone]  *Sedation Unit Scheduling- [phone]  *[place] Scheduling- General [phone]; Pediatric Dermatology [phone]  *Main  Services: [phone]   Turkmen: [phone]   Ugandan: [phone]   Hmong/Darrion/Mauritanian: [phone]    For urgent matters that cannot wait until the next business day, is over a holiday and/or a weekend please call [phone] and ask for the Dermatology Resident On-Call to be paged.               Avoid protopic application on the days that he is using the light box.     He has had some good repigmentation. Keep up the good work!  You should still see steady improvement.  If your light machine runs out of treatments, call our clinic for a refill. You will need to provide the blinking 4-digit code.      If he does completely repigment, you can drop the  This patient is 2 days postop and is being discharged today.  I will put in a prescription for some home pain medications.   therapies down from 3 nights a week to 2 nights per week.    Keep using the tretinoin as you are. Your face is looking great!    Follow up with Dr. Rojo in 4 months.          Follow-ups after your visit        Follow-up notes from your care team     Return in about 4 months (around 12/15/2017) for vitiligo.      Your next 10 appointments already scheduled     Aug 15, 2017 10:30 AM CDT   Return Visit with Katie Rojo MD   Peds Dermatology ([facility])    [facility]  [address] [address] [phone]            Dec 19, 2017  9:45 AM CST   Return Visit with MD Sylvain Head Dermatology ([facility])    [facility] [address] [address] [phone]              Who to contact     Please call your clinic at [phone] to:    Ask questions about your health    Make or cancel appointments    Discuss your medicines    Learn about your test results    Speak to your doctor   If you have compliments or concerns about an experience at your clinic, or if you wish to file a complaint, please contact [facility] Patient Relations at [phone] or email us at [email or web address]         Additional Information About Your Visit        MyChart Information     EeBriat is an electronic gateway that provides easy, online access to your medical records. With EeBriat, you can request a clinic appointment, read your test results, renew a prescription or communicate with your care team.     To sign up for [employer], please contact your [facility] or call [phone] for assistance.           Care EveryWhere ID     This is your Care EveryWhere ID. This could be used by other organizations to access your [facility] medical records  [id number]         Blood Pressure from Last 3 Encounters:   06/13/17 124/73   12/08/15 129/81    Weight  from Last 3 Encounters:   06/13/17 84 lb 3.5 oz (38.2 kg) (33 %)*   12/08/15 68 lb 5.5 oz (31 kg) (26 %)*     * Growth percentiles are based on [facility] 2-20 Years data.              Today, you had the following     No orders found for display       Primary Care Provider Office Phone # Fax #    Elizabeth Martin [phone]       [facility] [address] [facility] [place] [address]        Equal Access to Services     MIKAELA CALVIN : Hadii aad ku hadasho Soomaali, waaxda luqadaha, qaybta kaalmada adeegyada, waxay aricin hayaan tanika barrera . So [facility] [phone].    ATENCIÓN: Si habla español, tiene a gray disposición servicios gratuitos de asistencia lingüística. [facility] [phone].    We comply with applicable federal civil rights laws and [place] laws. We do not discriminate on the basis of race, color, national origin, age, disability sex, sexual orientation or gender identity.            Thank you!     Thank you for choosing PEDS DERMATOLOGY  for your care. Our goal is always to provide you with excellent care. Hearing back from our patients is one way we can continue to improve our services. Please take a few minutes to complete the written survey that you may receive in the mail after your visit with us. Thank you!             Your Updated Medication List - Protect others around you: Learn how to safely use, store and throw away your medicines at www.disposemymeds.org.          This list is accurate as of: 8/15/17 10:24 AM.  Always use your most recent med list.                   Brand Name Dispense Instructions for use Diagnosis    CETIRIZINE HCL PO      Take 10 mLs by mouth daily        eucerin cream      Apply topically as needed        MELATONIN PO      Take 5 mg by mouth daily        mometasone 50 MCG/ACT spray    NASONEX     Spray 2 sprays into both nostrils daily        multivitamin, therapeutic with minerals Tabs tablet      Take 1 tablet by mouth daily         ONDANSETRON PO      Take 4 mg by mouth as needed        tacrolimus 0.1 % ointment    PROTOPIC    60 g    Apply twice daily on weekdays to vitiligo areas on the genitals, eyelids    Vitiligo       tretinoin 0.025 % cream    RETIN-A    45 g    Use small amount to areas with acne every other night, increase to nightly as tolerated    Acne vulgaris       triamcinolone 0.1 % cream    KENALOG    80 g    Apply to vitiligo areas twice daily on weekends.    Vitiligo

## 2020-03-26 NOTE — TELEPHONE ENCOUNTER
Per verbal from Alice Haro, please have Dr. Thomas EMadelin Toribio 5-325mg (take 1 tablet Q4-6H prn) #40.     I notified Blanca of instructions. Per verbal from Blanca she will take care of this.

## 2020-03-26 NOTE — PROGRESS NOTES
"DAILY PROGRESS NOTE  [facility]    Patient Identification:  Name: Jannet Fierro  Age: 64 y.o.  Sex: female  :  1955  MRN: [id number]         Primary Care Physician: Sandra Roe MD    Subjective:  Interval History:She feels better today. Pain better.    Objective:    Scheduled Meds:  DULoxetine 60 mg Oral Daily   ipratropium-albuterol 3 mL Nebulization 4x Daily - RT   [START ON 3/27/2020] methylPREDNISolone 4 mg Oral 4x Daily Taper   [START ON 3/28/2020] methylPREDNISolone 4 mg Oral TID Around Food   [START ON 3/29/2020] methylPREDNISolone 4 mg Oral Before Breakfast   [START ON 3/29/2020] methylPREDNISolone 4 mg Oral Tonight   [START ON 3/30/2020] methylPREDNISolone 4 mg Oral Before Breakfast   methylPREDNISolone 8 mg Oral Tonight   metoprolol tartrate 25 mg Oral BID   oxymetazoline 2 spray Each Nare BID   polyethylene glycol 17 g Oral Daily   sodium chloride 3 mL Intravenous Q12H     Continuous Infusions:     Vital signs in last 24 hours:  Temp:  [97 °F (36.1 °C)-97.9 °F (36.6 °C)] 97.9 °F (36.6 °C)  Heart Rate:  [] 104  Resp:  [16-20] 20  BP: (126-141)/(62-80) 138/80    Intake/Output:  No intake or output data in the 24 hours ending 20 1026    Exam:  /80 (BP Location: Right arm, Patient Position: Sitting)   Pulse 104   Temp 97.9 °F (36.6 °C) (Oral)   Resp 20   Ht 162.6 cm (64\")   Wt 116 kg (256 lb 3.2 oz)   LMP  (LMP Unknown)   SpO2 99%   BMI 43.98 kg/m²     General Appearance:    Alert, cooperative, no distress   Head:    Normocephalic, without obvious abnormality, atraumatic   Eyes:       Throat:   Lips, tongue, gums normal   Neck:   Supple, symmetrical, trachea midline, no JVD   Lungs:     Clear to auscultation bilaterally, respirations unlabored   Chest Wall:    No tenderness or deformity    Heart:    Regular rate and rhythm, S1 and S2 normal, no murmur,no  Rub or gallop   Abdomen:     Soft, non-tender, bowel sounds active, no masses, no organomegaly    "   Extremities:   Extremities normal, atraumatic, no cyanosis or edema   Pulses:      Skin:   Skin is warm and dry,  no rashes or palpable lesions   Neurologic:   no focal deficits noted      Lab Results (last 72 hours)     Procedure Component Value Units Date/Time    Basic Metabolic Panel [[id number]]  (Normal) Collected:  03/26/20 0438    Specimen:  Blood Updated:  03/26/20 0530     Glucose 98 mg/dL      BUN 12 mg/dL      Creatinine 0.59 mg/dL      Sodium 140 mmol/L      Potassium 3.7 mmol/L      Chloride 100 mmol/L      CO2 29.0 mmol/L      Calcium 9.0 mg/dL      eGFR Non African Amer 103 mL/min/1.73      BUN/Creatinine Ratio 20.3     Anion Gap 11.0 mmol/L     Narrative:       GFR Normal >60  Chronic Kidney Disease <60  Kidney Failure <15      CBC & Differential [[id number]] Collected:  03/26/20 0438    Specimen:  Blood Updated:  03/26/20 0504    Narrative:       The following orders were created for panel order CBC & Differential.  Procedure                               Abnormality         Status                     ---------                               -----------         ------                     CBC Auto Differential[[id number]]        Abnormal            Final result                 Please view results for these tests on the individual orders.    CBC Auto Differential [[id number]]  (Abnormal) Collected:  03/26/20 0438    Specimen:  Blood Updated:  03/26/20 0504     WBC 11.14 10*3/mm3      RBC 4.48 10*6/mm3      Hemoglobin 13.3 g/dL      Hematocrit 40.4 %      MCV 90.2 fL      MCH 29.7 pg      MCHC 32.9 g/dL      RDW 13.4 %      RDW-SD 43.8 fl      MPV 9.4 fL      Platelets 177 10*3/mm3      Neutrophil % 78.1 %      Lymphocyte % 12.8 %      Monocyte % 8.3 %      Eosinophil % 0.1 %      Basophil % 0.2 %      Immature Grans % 0.5 %      Neutrophils, Absolute 8.70 10*3/mm3      Lymphocytes, Absolute 1.43 10*3/mm3      Monocytes, Absolute 0.92 10*3/mm3      Eosinophils, Absolute 0.01 10*3/mm3      Basophils,  Absolute 0.02 10*3/mm3      Immature Grans, Absolute 0.06 10*3/mm3      nRBC 0.0 /100 WBC     T4, Free [[id number]]  (Normal) Collected:  03/25/20 1503    Specimen:  Blood Updated:  03/25/20 1556     Free T4 1.25 ng/dL     Narrative:       Results may be falsely increased if patient taking Biotin.      TSH [[id number]]  (Normal) Collected:  03/25/20 1503    Specimen:  Blood Updated:  03/25/20 1556     TSH 0.533 uIU/mL     CBC & Differential [[id number]] Collected:  03/25/20 1001    Specimen:  Blood Updated:  03/25/20 1034    Narrative:       The following orders were created for panel order CBC & Differential.  Procedure                               Abnormality         Status                     ---------                               -----------         ------                     CBC Auto Differential[[id number]]        Abnormal            Final result                 Please view results for these tests on the individual orders.    CBC Auto Differential [[id number]]  (Abnormal) Collected:  03/25/20 1001    Specimen:  Blood Updated:  03/25/20 1034     WBC 12.99 10*3/mm3      RBC 4.43 10*6/mm3      Hemoglobin 13.3 g/dL      Hematocrit 39.8 %      MCV 89.8 fL      MCH 30.0 pg      MCHC 33.4 g/dL      RDW 13.1 %      RDW-SD 43.2 fl      MPV 9.7 fL      Platelets 207 10*3/mm3      Neutrophil % 83.7 %      Lymphocyte % 5.9 %      Monocyte % 8.9 %      Eosinophil % 0.0 %      Basophil % 0.3 %      Immature Grans % 1.2 %      Neutrophils, Absolute 10.88 10*3/mm3      Lymphocytes, Absolute 0.76 10*3/mm3      Monocytes, Absolute 1.16 10*3/mm3      Eosinophils, Absolute 0.00 10*3/mm3      Basophils, Absolute 0.04 10*3/mm3      Immature Grans, Absolute 0.15 10*3/mm3      nRBC 0.0 /100 WBC     CBC & Differential [[id number]] Collected:  03/24/20 0412    Specimen:  Blood Updated:  03/24/20 0558    Narrative:       The following orders were created for panel order CBC & Differential.  Procedure                                Abnormality         Status                     ---------                               -----------         ------                     CBC Auto Differential[[id number]]        Abnormal            Final result                 Please view results for these tests on the individual orders.    CBC Auto Differential [[id number]]  (Abnormal) Collected:  03/24/20 0412    Specimen:  Blood Updated:  03/24/20 0558     WBC 10.17 10*3/mm3      RBC 4.21 10*6/mm3      Hemoglobin 12.6 g/dL      Hematocrit 37.7 %      MCV 89.5 fL      MCH 29.9 pg      MCHC 33.4 g/dL      RDW 13.0 %      RDW-SD 41.9 fl      MPV 9.7 fL      Platelets 197 10*3/mm3      Neutrophil % 85.0 %      Lymphocyte % 6.2 %      Monocyte % 8.1 %      Eosinophil % 0.0 %      Basophil % 0.2 %      Immature Grans % 0.5 %      Neutrophils, Absolute 8.65 10*3/mm3      Lymphocytes, Absolute 0.63 10*3/mm3      Monocytes, Absolute 0.82 10*3/mm3      Eosinophils, Absolute 0.00 10*3/mm3      Basophils, Absolute 0.02 10*3/mm3      Immature Grans, Absolute 0.05 10*3/mm3      nRBC 0.0 /100 WBC         Data Review:  Results from last 7 days   Lab Units 03/26/20  0438 03/19/20  1331   SODIUM mmol/L 140 141   POTASSIUM mmol/L 3.7 4.1   CHLORIDE mmol/L 100 103   CO2 mmol/L 29.0 23.9   BUN mg/dL 12 11   CREATININE mg/dL 0.59 0.72   GLUCOSE mg/dL 98 84   CALCIUM mg/dL 9.0 9.1     Results from last 7 days   Lab Units 03/26/20  0438 03/25/20  1001 03/24/20  0412   WBC 10*3/mm3 11.14* 12.99* 10.17   HEMOGLOBIN g/dL 13.3 13.3 12.6   HEMATOCRIT % 40.4 39.8 37.7   PLATELETS 10*3/mm3 177 207 197     Results from last 7 days   Lab Units 03/25/20  1503   TSH uIU/mL 0.533   FREE T4 ng/dL 1.25         No results found for: TROPONINT            Invalid input(s): PROT, LABALBU  Results from last 7 days   Lab Units 03/25/20  1503   TSH uIU/mL 0.533   FREE T4 ng/dL 1.25         No results found for: POCGLU        Past Medical History:   Diagnosis Date   • Back pain    • Cervical radiculitis 2/11/2020    • Chronic kidney disease     stones   • Chronic midline low back pain without sciatica 11/29/2017   • Compression fracture of first lumbar vertebra (CMS/formerly [facility])     2015 history   • Hard to intubate     be careful pt states she is to tell she has a small mouth   • Mild single current episode of major depressive disorder (CMS/formerly [facility]) 11/29/2017   • Neck pain    • Sleep apnea     cpap       Assessment:  Active Hospital Problems    Diagnosis  POA   • **Cervical radiculitis [M54.12]  Unknown   • Sleep apnea [G47.30]  Unknown   • Tachycardia [R00.0]  Unknown   • Multiple thyroid nodules [E04.2]  Yes   • Chronic midline low back pain with bilateral sciatica [M54.41, M54.42, G89.29]  Yes      Resolved Hospital Problems   No resolved problems to display.       Plan:  OK with DC plan and follow up with PCP. Continue metoprolol.    Feliciano Conteh MD  3/26/2020  10:26

## 2020-03-26 NOTE — PROGRESS NOTES
Continued Stay Note  [facility]     Patient Name: Jannet Fierro  MRN: [id number]  Today's Date: 3/26/2020    Admit Date: 3/23/2020    Discharge Plan     Row Name 03/26/20 1407       Plan    Final Discharge Disposition Code  01 - home or self-care    Final Note  Pt d/c'ed home, no needs identified.        Discharge Codes    No documentation.       Expected Discharge Date and Time     Expected Discharge Date Expected Discharge Time    Mar 26, 2020             Ursula Jaeger RN

## 2020-03-26 NOTE — PLAN OF CARE
Problem: Patient Care Overview  Goal: Plan of Care Review  Outcome: Ongoing (interventions implemented as appropriate)  Flowsheets (Taken 3/26/2020 0822)  Progress: improving  Plan of Care Reviewed With: patient  Outcome Summary: Pt up ad edis, hard collar on at all times. VSS. No more tachycardic, stable HR. Pain is no issue. Pt complaining of congestion, unable to exhale comfortably, got Afrin ordered. Pt educated on sleep apnea management. Pt anticipates d/c home today when ready.

## 2020-03-26 NOTE — DISCHARGE SUMMARY
Date of admission: March 23, 2020      Date of discharge: March 26, 2020      Hospital course: Ms. Fierro underwent an anterior cervical discectomy and fusion from C4-C6 3/23/20.  She has done well since surgery and is afebrile with normal vital signs.  She is walking and voiding without difficulty.  She had a bowel movement this morning.  She had some congestion postoperatively that has improved and essentially resolved at this point.  She is swallowing without difficulty.  Her neck is soft and flat and the wound is healing well.  The patient was evaluated by the hospitalist service for some tachycardia.  She was started on metoprolol and her heart rate last night was in the 90s at most.  She has been cleared by [facility] for discharge home.  During surgery Dr. Thomas did notice that her bones were rather soft and she has been placed in a hard collar postoperatively.  The brace fits well.  She will be discharged home this afternoon and follow-up 2 weeks from surgery.  She will have x-rays prior to that postoperative appointment.      Discharge restrictions: No driving, no lifting over 5 pounds, no overhead.  Patient should wear the hard collar at all times except when bathing.      Wound care: Patient can shower daily starting tomorrow and clean the wound with soap and water.  She will call with any fever chills or incisional redness swelling or drainage.      Discharge medications: Please see the discharge medication reconciliation sheet for complete list of discharge medications.

## 2020-03-30 ENCOUNTER — TELEPHONE (OUTPATIENT)
Dept: NEUROSURGERY | Facility: CLINIC | Age: 65
End: 2020-03-30

## 2020-03-30 NOTE — TELEPHONE ENCOUNTER
How are you feeling? Very well    Are you having any pain? Where? Top of shoulder pain    Rate pain from 1-10 - 7    Are you taking the pain RX? Only taken it once    Do you think it's helping? Not really, felt better after taking a 2nd one 8 hours later    Do you feel better than before surgery? yes    Dermabond OK? yes    Is you incision red, swollen or bleeding?None, no fever    Are you having any trouble with nausea or constipation? Constipation, advised to use OTC meds. Also nauseated    Were your discharge instructions easy to understand? yes    Any other questions?    Confirm post op appt - yes

## 2020-04-07 ENCOUNTER — TELEMEDICINE (OUTPATIENT)
Dept: NEUROSURGERY | Facility: CLINIC | Age: 65
End: 2020-04-07

## 2020-04-07 DIAGNOSIS — Z09 FOLLOW-UP EXAMINATION FOLLOWING SURGERY: Primary | ICD-10-CM

## 2020-04-07 PROCEDURE — 99024 POSTOP FOLLOW-UP VISIT: CPT | Performed by: NEUROLOGICAL SURGERY

## 2020-04-07 NOTE — PROGRESS NOTES
Subjective   History of Present Illness: Jannet Fierro is a 64 y.o. female is here today for post-op follow-up via [employer] visit. Ms. Fierro is now 2 weeks out from a C4-5, C5-6 ACDF done on 3/23/2020.    This was an audio and video enabled telemedicine encounter.    History of Present Illness     This patient called today.  She is doing well.  Her arm pain is gone.  She has some stiffness in her neck particularly with range of motion.    The following portions of the patient's history were reviewed and updated as appropriate: allergies, current medications, past family history, past medical history, past social history, past surgical history and problem list.    Review of Systems   Constitutional: Negative for fever.   Respiratory: Negative for chest tightness and shortness of breath.    Cardiovascular: Negative for chest pain.   Gastrointestinal: Negative for abdominal distention.   Musculoskeletal: Positive for neck pain and neck stiffness.       Objective     Her incision looks good.    Physical Exam   Constitutional: She is oriented to person, place, and time.   Neurological: She is oriented to person, place, and time.     Neurologic Exam     Mental Status   Oriented to person, place, and time.           Assessment/Plan   Independent Review of Radiographic Studies:      I personally reviewed the images from the following studies.    There are no new images to review    Medical Decision Making:      I told the patient to come out of her collar and begin range of motion exercises.  I showed her how to do those.  We will see her in 6 weeks with an x-ray.    Jannet was seen today for post-op.    Diagnoses and all orders for this visit:    Follow-up examination following surgery  -     XR Spine Cervical Complete With Flex Ext; Future      Return in about 6 weeks (around 5/19/2020).

## 2020-04-08 RX ORDER — DULOXETIN HYDROCHLORIDE 60 MG/1
60 CAPSULE, DELAYED RELEASE ORAL DAILY
Qty: 90 CAPSULE | Refills: 1 | Status: SHIPPED | OUTPATIENT
Start: 2020-04-08 | End: 2020-10-01

## 2020-04-16 RX ORDER — ASPIRIN 81 MG/1
81 TABLET ORAL DAILY
Qty: 90 TABLET | Refills: 1 | Status: SHIPPED | OUTPATIENT
Start: 2020-04-16 | End: 2020-12-01 | Stop reason: SDUPTHER

## 2020-04-22 ENCOUNTER — TELEPHONE (OUTPATIENT)
Dept: NEUROSURGERY | Facility: CLINIC | Age: 65
End: 2020-04-22

## 2020-04-22 ENCOUNTER — OFFICE VISIT (OUTPATIENT)
Dept: NEUROSURGERY | Facility: CLINIC | Age: 65
End: 2020-04-22

## 2020-04-22 VITALS — HEART RATE: 117 BPM | DIASTOLIC BLOOD PRESSURE: 85 MMHG | SYSTOLIC BLOOD PRESSURE: 129 MMHG | TEMPERATURE: 98.4 F

## 2020-04-22 DIAGNOSIS — Z51.89 VISIT FOR WOUND CHECK: Primary | ICD-10-CM

## 2020-04-22 PROCEDURE — 99024 POSTOP FOLLOW-UP VISIT: CPT | Performed by: NURSE PRACTITIONER

## 2020-04-22 RX ORDER — SULFAMETHOXAZOLE AND TRIMETHOPRIM 800; 160 MG/1; MG/1
1 TABLET ORAL 2 TIMES DAILY
Qty: 10 TABLET | Refills: 0 | Status: SHIPPED | OUTPATIENT
Start: 2020-04-22 | End: 2020-04-27 | Stop reason: SDUPTHER

## 2020-04-22 NOTE — PROGRESS NOTES
Subjective   History of Present Illness: Jannet Fierro is a 64 y.o. female is here today for post-op wound check. Ms. Fierro is almost 30 days out from a C4-5, C5-6 ACDF done by Dr. Thomas on 3/23/2020.    Ms. Fierro contacted the office today regarding drainage from her incision since Saturday.    Today in the office her wound has slight amount of blood tinged liquid from each end of her incision. This started on Saturday. She denies any fevers. She has very little pain in her neck.    She reports that she had blood-tinged drainage that began coming out of our lateral aspects of the right anterior incision site.  There is increased underlying tenderness, but she denies any purulent drainage.  Overall, she is doing well from her surgery and reports some ongoing weakness in the right arm but complete resolution of arm pain.  She has been covering the incision site with gauze.  She denies any new problems today.    She is wearing a mask in our office today.    Wound Check   She was originally treated more than 14 days ago. Previous treatment included oral antibiotics and wound cleansing or irrigation. Maximum temperature: 98.4. There has been bloody and clear discharge from the wound. There is no redness present. There is no swelling present. There is no pain present. She has no difficulty moving the affected extremity or digit.       /85   Pulse 117   Temp 98.4 °F (36.9 °C)   LMP  (LMP Unknown)     The following portions of the patient's history were reviewed and updated as appropriate: allergies, current medications, past family history, past medical history, past social history, past surgical history and problem list.    Review of Systems   Constitutional: Negative for fever.   Respiratory: Negative for chest tightness and shortness of breath.    Cardiovascular: Negative for chest pain.   Endocrine: Negative.    Genitourinary: Negative.    Musculoskeletal: Positive for neck pain.   Allergic/Immunologic:  Negative.    Neurological: Negative.    Hematological: Negative.    Psychiatric/Behavioral: Negative.        Objective     Vitals:    04/22/20 1453   BP: 129/85   Pulse: 117   Temp: 98.4 °F (36.9 °C)     There is no height or weight on file to calculate BMI.      Physical Exam   Constitutional: She is oriented to person, place, and time. She appears well-developed and well-nourished.  Non-toxic appearance. She does not have a sickly appearance. She does not appear ill.   Pleasant well-appearing obese older female   HENT:   Head: Normocephalic and atraumatic.   Eyes: EOM are normal.   Neck: Neck supple. No tracheal deviation present.   Pulmonary/Chest: Effort normal.   Abdominal: Soft.   Musculoskeletal: She exhibits no tenderness or deformity.   Moving all extremities well, some residual weakness right upper extremity distally   Neurological: She is alert and oriented to person, place, and time. She displays no tremor. No cranial nerve deficit or sensory deficit. Gait normal. GCS eye subscore is 4. GCS verbal subscore is 5. GCS motor subscore is 6.   Stable upright gait   Skin: Skin is warm and dry.   Right anterior incision site is well approximated.  At the far lateral aspect of the incision there are 2 pinpoint open areas with serous sanguinous drainage that is easily expressed.  No evidence of any purulent fluid.  There is surrounding tenderness and otherwise the incision is healing well   Psychiatric: She has a normal mood and affect. Her behavior is normal. Thought content normal.   Vitals reviewed.    Neurologic Exam     Mental Status   Oriented to person, place, and time.     Cranial Nerves     CN III, IV, VI   Extraocular motions are normal.           Assessment/Plan   Independent Review of Radiographic Studies:      I personally reviewed the images from the following studies.    No new imaging    Medical Decision Making:    She presents office today for wound check after calling and reporting 3 days of  "\"drainage\" from the left and right far lateral aspects of the right anterior incision site.  The incision itself is well-healed but there are 2 pinpoint openings are blood-tinged fluid is draining out.  No evidence of any purulent fluid and she has been afebrile.  I sent a photo of the incision site to Dr. Thomas and he agrees that she needs to be on oral antibiotics for 1 week.  We will have her follow-up in the office on Monday for an additional wound check.  If her symptoms change or worsen she is to call our office at any time.  She is to keep the incision site uncovered.  The wound itself appears superficial.    Plan: Bactrim for 1 week, return to office on Monday for wound check    Jannet was seen today for post-op.    Diagnoses and all orders for this visit:    Visit for wound check    Other orders  -     sulfamethoxazole-trimethoprim (BACTRIM DS,SEPTRA DS) 800-160 MG per tablet; Take 1 tablet by mouth 2 (Two) Times a Day.      Return in about 1 week (around 4/29/2020).         "

## 2020-04-23 NOTE — PROGRESS NOTES
"Subjective     History of Present Illness    History of Present Illness: Jannet Fierro is a 64 y.o. female is here today for follow-up. Ms. Fierro is 5 weeks out from having a C4-5, C5-6 ACDF done by Dr. Thomas on 3/23/2020. She was seen on 04/22/20 for a wound check with complains of \"drainage.\" She was given a 5 days course or oral antibiotics. She reports that there is still drainage at night. She will wake up with it \"crusty.\" She reports some discomfort when swallowing.     Overall, the drainage has decreased.  She continues to deny any fever, chills or any other problems.  She does have some intermittent swallowing difficulty which has been present since surgery.  She also has the \"cold feeling\" in the right arm that typically occurs at night when she is sleeping.  She does feel that this is improving.  She is requesting a muscle relaxer due to discomfort in the right neck and between her shoulder blades.  She denies any new problems.  She is no longer having to wear a gauze dressing over the incision site as the drainage has lessened.      /86   Pulse 97   Temp 98.7 °F (37.1 °C)   Ht 162.6 cm (64\")   Wt 116 kg (256 lb)   LMP  (LMP Unknown)   BMI 43.94 kg/m²       The following portions of the patient's history were reviewed and updated as appropriate: allergies, current medications, past family history, past medical history, past social history, past surgical history and problem list.    Review of Systems   Respiratory: Negative for chest tightness.    Cardiovascular: Negative for chest pain.   Musculoskeletal: Negative for neck pain.   Skin: Positive for wound.       Objective       Physical Exam   Constitutional: She is oriented to person, place, and time. She appears well-developed and well-nourished. She is cooperative.  Non-toxic appearance. She does not have a sickly appearance. She does not appear ill.   Pleasant, well-appearing, nontoxic obese older female   HENT:   Head: Normocephalic " and atraumatic.   Eyes:   Corrective lenses   Neck: Neck supple. No tracheal deviation present.   Pulmonary/Chest: Effort normal.   Musculoskeletal: She exhibits tenderness. She exhibits no deformity.        Cervical back: She exhibits tenderness. She exhibits normal range of motion, no bony tenderness and no pain.   Full cervical range of motion  Strength intact   Neurological: She is alert and oriented to person, place, and time. She has normal strength. She displays no tremor. No sensory deficit. Gait normal. GCS eye subscore is 4. GCS verbal subscore is 5. GCS motor subscore is 6.   Stable upright gait   Skin: Skin is warm and dry.   Well-healing right anterior incision site, well approximated, trace amount of drainage with firm expression at the lateral aspects of the incision site.  Overall, much less compared to her office visit 1 week ago.  The incision site is red with some tenderness to palpation, drainage continues to be serosanguineous nonpurulent   Psychiatric: She has a normal mood and affect. Her behavior is normal. Thought content normal.   Vitals reviewed.    Neurologic Exam     Mental Status   Oriented to person, place, and time.     Motor Exam     Strength   Strength 5/5 throughout.           Assessment/Plan   Independent Review of Radiographic Studies:      I personally reviewed the images from the following studies.    No new imaging    Medical Decision Making:    She returns the office today for 5-day follow-up for wound check regarding the right anterior cervical incision site.  There does continue to be a trace amount of drainage but only to very firm expression/palpation.  There is no drainage except sometimes at night when she is sleeping.  She continues to be afebrile and has no other systemic signs of infection.  The incision is otherwise healing very well.  I have kept her on the Bactrim and we will resume this for an additional week.  Have also prescribed a muscle relaxers that she can  begin taking today as needed.  She is to call at anytime with any worsening of the wound or any other questions.  We will have her follow-up in 1 week.    Plan: Return to office in 1 week for wound check, resume Bactrim as directed, initiate muscle relaxers    Jannet was seen today for post-op and wound check.    Diagnoses and all orders for this visit:    Visit for wound check    Other orders  -     cyclobenzaprine (FLEXERIL) 10 MG tablet; Take 1 tablet by mouth 2 (Two) Times a Day As Needed for Muscle Spasms.  -     sulfamethoxazole-trimethoprim (BACTRIM DS,SEPTRA DS) 800-160 MG per tablet; Take 1 tablet by mouth 2 (Two) Times a Day.      Return in about 1 week (around 5/4/2020).

## 2020-04-27 ENCOUNTER — OFFICE VISIT (OUTPATIENT)
Dept: NEUROSURGERY | Facility: CLINIC | Age: 65
End: 2020-04-27

## 2020-04-27 VITALS
DIASTOLIC BLOOD PRESSURE: 86 MMHG | WEIGHT: 256 LBS | TEMPERATURE: 98.7 F | SYSTOLIC BLOOD PRESSURE: 177 MMHG | HEIGHT: 64 IN | BODY MASS INDEX: 43.71 KG/M2 | HEART RATE: 97 BPM

## 2020-04-27 DIAGNOSIS — Z51.89 VISIT FOR WOUND CHECK: Primary | ICD-10-CM

## 2020-04-27 PROCEDURE — 99024 POSTOP FOLLOW-UP VISIT: CPT | Performed by: NURSE PRACTITIONER

## 2020-04-27 RX ORDER — SULFAMETHOXAZOLE AND TRIMETHOPRIM 800; 160 MG/1; MG/1
1 TABLET ORAL 2 TIMES DAILY
Qty: 14 TABLET | Refills: 0 | Status: SHIPPED | OUTPATIENT
Start: 2020-04-27 | End: 2020-05-07

## 2020-04-27 RX ORDER — CYCLOBENZAPRINE HCL 10 MG
10 TABLET ORAL 2 TIMES DAILY PRN
Qty: 60 TABLET | Refills: 0 | Status: SHIPPED | OUTPATIENT
Start: 2020-04-27 | End: 2021-08-16 | Stop reason: HOSPADM

## 2020-05-01 NOTE — PROGRESS NOTES
Subjective   History of Present Illness: Jannet Fierro is a 64 y.o. female is here today for follow-up with wound check. Ms. Fierro is now 1 month and 1 week out from having a C4-5, C5-6 ACDF done on 3/23/2020.    Today in the office she reports her incision still has drainage at night. The medial side is burning. She has 1 day left of Bactrim. She has not had any fevers    Wound Check   She was originally treated more than 14 days ago. Previous treatment included oral antibiotics. Her temperature was unmeasured prior to arrival. There has been clear and bloody (orange) discharge from the wound. The redness has not changed. There is no swelling present. The pain has not changed (Burning on medial end). She has no difficulty moving the affected extremity or digit.       The following portions of the patient's history were reviewed and updated as appropriate: allergies, current medications, past family history, past medical history, past social history, past surgical history and problem list.    Review of Systems   Constitutional: Negative for fever.   Respiratory: Negative for chest tightness and shortness of breath.    Cardiovascular: Negative for chest pain.   Musculoskeletal: Positive for neck pain.   Skin: Positive for wound.       Objective     Vitals:    05/05/20 1025   BP: 139/80   Pulse: 101   Temp: 98.4 °F (36.9 °C)     There is no height or weight on file to calculate BMI.      Physical Exam   Constitutional: She is oriented to person, place, and time. She appears well-developed and well-nourished.   HENT:   Head: Normocephalic and atraumatic.   Eyes: Pupils are equal, round, and reactive to light. EOM are normal.   Neck: Normal range of motion. Neck supple.   Pulmonary/Chest: Effort normal.   Neurological: She is alert and oriented to person, place, and time.   Incision tender but not red or warm.  Some clear drainage from medial tip of wound. Several pin point holes along lateral portion and medial  portion of wound.    Skin: Skin is warm and dry.   Nursing note and vitals reviewed.    Neurologic Exam     Mental Status   Oriented to person, place, and time.     Cranial Nerves     CN III, IV, VI   Pupils are equal, round, and reactive to light.  Extraocular motions are normal.           Assessment/Plan   Independent Review of Radiographic Studies:          Medical Decision Making:    Ms. Fierro is now 6wks out from an ACDF C4-6 by Dr. Thomas.  She has done very well and no longer has any real radicular pain. At her last appt she got muscle relaxers for some neck spasm and they have helped quite a bit.  She is not using any narcotic medication. Her only complaint is continued wound drainage.  The wound began to drain about 3-4wks postop.  She has been on 2 courses of bactrim.  She has not been doing any particular wound care other than a wet rag in the shower and patting it dry. No fever or chills but the wound continues to be tender and is now draining a bit more from both the medical and lateral ends of the wound. She has had continued issues with retained sutures coming up through the skin.  There are several pinpoint holes along the wound and a drop of clear drainage from the wound today in the office. She is tender but no redness or swelling.     Given the lack of improvement with 2 rounds of abx I have recommended that we go ahead and get a postop MRI to make sure there isn't a deeper fluid collection. I also recommended that she clean the wound with betadine tid and gave her the supplies in the office. We will get the MRI done stat and have her follow up Thursday to review the MRI results and also check the wound. She will call sooner with concerns or changes.   Jannet was seen today for post-op and wound check.    Diagnoses and all orders for this visit:    Visit for wound check  -     MRI Cervical Spine With & Without Contrast; Future  -     XR spine cervical 2 or 3 vw; Future    Surgery follow-up  examination      Return in about 2 days (around 5/7/2020) for with Dr. Thomas or any extender while he is here.

## 2020-05-05 ENCOUNTER — OFFICE VISIT (OUTPATIENT)
Dept: NEUROSURGERY | Facility: CLINIC | Age: 65
End: 2020-05-05

## 2020-05-05 VITALS — TEMPERATURE: 98.4 F | DIASTOLIC BLOOD PRESSURE: 80 MMHG | SYSTOLIC BLOOD PRESSURE: 139 MMHG | HEART RATE: 101 BPM

## 2020-05-05 DIAGNOSIS — Z51.89 VISIT FOR WOUND CHECK: Primary | ICD-10-CM

## 2020-05-05 DIAGNOSIS — Z09 SURGERY FOLLOW-UP EXAMINATION: ICD-10-CM

## 2020-05-05 PROCEDURE — 99024 POSTOP FOLLOW-UP VISIT: CPT | Performed by: PHYSICIAN ASSISTANT

## 2020-05-05 NOTE — PROGRESS NOTES
"Subjective   History of Present Illness: Jannet Fierro is a 64 y.o. female is here for follow up to discuss cervical MRI and plain film results.  Patient had surgery 3.23.20 by Dr Thomas, C4/5 C5/6 ACDF    Patient was last seen 5.5.20 for post op follow up for wound check and has completed 2 rounds of Bactrim    Patient states that she still has bloody tinged drainage from end of her incision, no fever or chills.  She is cleaning her incision TID with Betadine.  She is not currently on any abx.  She denies neck pain, arm pain, arm weakness, she does have right arm coldness.  She has intermittent N/T right hand.   She denies urinary incontinence or problems with her balance and gait.    She is not taking any pain meds, but does take Flexeril prn    History of Present Illness    The following portions of the patient's history were reviewed and updated as appropriate: allergies, current medications, past family history, past medical history, past social history, past surgical history and problem list.    Review of Systems   Constitutional: Negative.    HENT: Negative.    Eyes: Negative.    Respiratory: Negative.    Cardiovascular: Negative.    Gastrointestinal: Negative.    Endocrine: Negative.    Genitourinary: Negative for urgency.   Musculoskeletal: Negative for arthralgias, gait problem, joint swelling, myalgias, neck pain and neck stiffness.   Allergic/Immunologic: Negative.    Neurological: Positive for numbness. Negative for weakness.   Hematological: Negative.    Psychiatric/Behavioral: Negative.        Objective     Vitals:    05/07/20 1357   BP: 148/69   Pulse: 74   Temp: 97.9 °F (36.6 °C)   TempSrc: Tympanic   Height: 162.6 cm (64.02\")     Body mass index is 43.92 kg/m².      Physical Exam  Neurologic Exam        Assessment/Plan   Independent Review of Radiographic Studies:     I did review the cervical spine MRI from May 6, 2020.  It shows the previous ACDF from C4-C6.  There is artifact of course from the " hardware but no obvious significant fluid collection or sign of infection which is what we wanted to rule out.  Medical Decision Making:    Ms. Fierro is now 6wks out from an ACDF C4-6 by Dr. Thomas.  She has done very well and no longer has any real radicular pain. She is not using any narcotic medication. Her only complaint is continued wound drainage.  The wound began to drain about 3-4wks postop.  She has been on 2 courses of bactrim as ordered by PATRIZIA Salter.  I saw her earlier this week and given the continued minimal wound drainage and small pinpoint areas of dehiscence I ordered a MRI and also had her begin cleaning the wound with betadine tid.  The MRI as discussed above does not show any definitive evidence of infection.  No deep fluid collection.  In addition the wound does look better today.  She states that there is still little bit of bloody drainage intermittently from either end of the wound but I could not express any drainage today and the areas of previous dehiscence are starting to heal and filled with granulation tissue.  There is still some mild tenderness although this would not be a typical of a wound that is only 6 weeks out from surgery.  No erythema or warmth.  I am pleased with the improvement from earlier this week.  She has been off Bactrim this week as well and will stay off antibiotics.  She will continue with the current wound regimen and follow-up next week for another wound check.  In the interim I am also going to send her for a sed rate, CRP and CBC just to follow for any other clinical indicators of potential infection.    Jannet was seen today for post-op and numbness.    Diagnoses and all orders for this visit:    Surgery follow-up examination  -     Sedimentation Rate; Future  -     C-reactive Protein; Future  -     CBC & Differential; Future      Return for monday or tuesday for wound check.         Answers for HPI/ROS submitted by the patient on 5/6/2020   What is the  primary reason for your visit?: Other  Please describe your symptoms.: Incision discharge.  Have you had these symptoms before?: Yes  How long have you been having these symptoms?: 1-4 weeks ago

## 2020-05-06 ENCOUNTER — HOSPITAL ENCOUNTER (OUTPATIENT)
Dept: GENERAL RADIOLOGY | Facility: HOSPITAL | Age: 65
Discharge: HOME | End: 2020-05-06

## 2020-05-06 ENCOUNTER — HOSPITAL ENCOUNTER (OUTPATIENT)
Dept: MRI IMAGING | Facility: HOSPITAL | Age: 65
Discharge: HOME | End: 2020-05-06
Admitting: PHYSICIAN ASSISTANT

## 2020-05-06 DIAGNOSIS — Z09 FOLLOW-UP EXAMINATION FOLLOWING SURGERY: ICD-10-CM

## 2020-05-06 DIAGNOSIS — Z51.89 VISIT FOR WOUND CHECK: ICD-10-CM

## 2020-05-06 PROCEDURE — 72156 MRI NECK SPINE W/O & W/DYE: CPT

## 2020-05-06 PROCEDURE — 72052 X-RAY EXAM NECK SPINE 6/>VWS: CPT

## 2020-05-06 PROCEDURE — 82565 ASSAY OF CREATININE: CPT

## 2020-05-06 PROCEDURE — A9577 INJ MULTIHANCE: HCPCS | Performed by: PHYSICIAN ASSISTANT

## 2020-05-06 PROCEDURE — 0 GADOBENATE DIMEGLUMINE 529 MG/ML SOLUTION: Performed by: PHYSICIAN ASSISTANT

## 2020-05-06 RX ADMIN — GADOBENATE DIMEGLUMINE 20 ML: 529 INJECTION, SOLUTION INTRAVENOUS at 17:26

## 2020-05-07 ENCOUNTER — OFFICE VISIT (OUTPATIENT)
Dept: NEUROSURGERY | Facility: CLINIC | Age: 65
End: 2020-05-07

## 2020-05-07 ENCOUNTER — TELEPHONE (OUTPATIENT)
Dept: NEUROSURGERY | Facility: CLINIC | Age: 65
End: 2020-05-07

## 2020-05-07 ENCOUNTER — LAB (OUTPATIENT)
Dept: LAB | Facility: HOSPITAL | Age: 65
End: 2020-05-07

## 2020-05-07 VITALS
BODY MASS INDEX: 43.92 KG/M2 | HEIGHT: 64 IN | DIASTOLIC BLOOD PRESSURE: 69 MMHG | HEART RATE: 74 BPM | SYSTOLIC BLOOD PRESSURE: 148 MMHG | TEMPERATURE: 97.9 F

## 2020-05-07 DIAGNOSIS — Z09 SURGERY FOLLOW-UP EXAMINATION: Primary | ICD-10-CM

## 2020-05-07 DIAGNOSIS — Z09 SURGERY FOLLOW-UP EXAMINATION: ICD-10-CM

## 2020-05-07 LAB
BASOPHILS # BLD AUTO: 0.14 10*3/MM3 (ref 0–0.2)
BASOPHILS NFR BLD AUTO: 2 % (ref 0–1.5)
CREAT BLDA-MCNC: 1.1 MG/DL (ref 0.6–1.3)
CRP SERPL-MCNC: 1.1 MG/DL (ref 0–0.5)
DEPRECATED RDW RBC AUTO: 42.8 FL (ref 37–54)
EOSINOPHIL # BLD AUTO: 0.34 10*3/MM3 (ref 0–0.4)
EOSINOPHIL NFR BLD AUTO: 4.8 % (ref 0.3–6.2)
ERYTHROCYTE [DISTWIDTH] IN BLOOD BY AUTOMATED COUNT: 13.1 % (ref 12.3–15.4)
ERYTHROCYTE [SEDIMENTATION RATE] IN BLOOD: 9 MM/HR (ref 0–30)
HCT VFR BLD AUTO: 47.2 % (ref 34–46.6)
HGB BLD-MCNC: 15.7 G/DL (ref 12–15.9)
IMM GRANULOCYTES # BLD AUTO: 0.02 10*3/MM3 (ref 0–0.05)
IMM GRANULOCYTES NFR BLD AUTO: 0.3 % (ref 0–0.5)
LYMPHOCYTES # BLD AUTO: 1.31 10*3/MM3 (ref 0.7–3.1)
LYMPHOCYTES NFR BLD AUTO: 18.4 % (ref 19.6–45.3)
MCH RBC QN AUTO: 30.1 PG (ref 26.6–33)
MCHC RBC AUTO-ENTMCNC: 33.3 G/DL (ref 31.5–35.7)
MCV RBC AUTO: 90.4 FL (ref 79–97)
MONOCYTES # BLD AUTO: 0.56 10*3/MM3 (ref 0.1–0.9)
MONOCYTES NFR BLD AUTO: 7.9 % (ref 5–12)
NEUTROPHILS # BLD AUTO: 4.76 10*3/MM3 (ref 1.7–7)
NEUTROPHILS NFR BLD AUTO: 66.6 % (ref 42.7–76)
NRBC BLD AUTO-RTO: 0 /100 WBC (ref 0–0.2)
PLATELET # BLD AUTO: 237 10*3/MM3 (ref 140–450)
PMV BLD AUTO: 9 FL (ref 6–12)
RBC # BLD AUTO: 5.22 10*6/MM3 (ref 3.77–5.28)
WBC NRBC COR # BLD: 7.13 10*3/MM3 (ref 3.4–10.8)

## 2020-05-07 PROCEDURE — 86140 C-REACTIVE PROTEIN: CPT

## 2020-05-07 PROCEDURE — 85025 COMPLETE CBC W/AUTO DIFF WBC: CPT

## 2020-05-07 PROCEDURE — 85652 RBC SED RATE AUTOMATED: CPT

## 2020-05-07 PROCEDURE — 99024 POSTOP FOLLOW-UP VISIT: CPT | Performed by: PHYSICIAN ASSISTANT

## 2020-05-07 NOTE — TELEPHONE ENCOUNTER
Patient was given the results and will call with any further problems or concerns.      ----- Message from Alice Haro PA-C sent at 5/7/2020  3:49 PM EDT -----  Blanca-    Will you let her know that her CBC was normal, CRP was stable compared to a year ago and ESR is pending. All good news. We can see her next week for wound check. Call as needed if drainage gets worse.       Thx

## 2020-05-08 NOTE — PROGRESS NOTES
"Subjective     History of Present Illness    History of Present Illness: Jannet Fierro is a 64 y.o. female is here today for follow-up wound check. Ms. Fierro is now 1 month and 3 weeks out from a C4-5,C5-6 ACDF done by Dr. Thomas on 3/23/2020. She was last seen 5/7/2020 for wound check. She reports some drainage from incision. The incision looks clean, dry. No redness or swelling. She reports some difficulty swallowing.     She is completed 2 rounds of Bactrim and was previously cleaning her incision site 3 times daily with Betadine.  When she was last here on May 7 a follow-up cervical MRI was ordered for further evaluation which revealed no evidence of infection and no deep fluid collection.  She did report a little bit of bloody drainage intermittently at the incision site.  She was sent for additional lab work, including sed rate, CRP and CBC.    She continues report intermittent bloody drainage that comes out at both ends of the incision site.  This does not occur all the time but she continues to feel a \"lump\" below the incision site that is \"hitting my esophagus.\"  This at times makes swallowing difficult.  She continues to deny any fever, chills or systemic signs of infection.    /82   Pulse 102   Temp 96.4 °F (35.8 °C)   Ht 162.6 cm (64.02\")   Wt 116 kg (256 lb)   LMP  (LMP Unknown)   BMI 43.92 kg/m²     The following portions of the patient's history were reviewed and updated as appropriate: allergies, current medications, past family history, past medical history, past social history, past surgical history and problem list.    Review of Systems   Respiratory: Negative for chest tightness and shortness of breath.    Cardiovascular: Negative for chest pain.   Musculoskeletal: Positive for neck pain.   Skin: Positive for wound (Anterior surgical incision site).   Neurological: Negative for weakness.   Hematological: Negative.    Psychiatric/Behavioral: Negative.        Objective     Vitals:    " "05/13/20 1438   BP: 139/82   Pulse: 102   Temp: 96.4 °F (35.8 °C)   Weight: 116 kg (256 lb)   Height: 162.6 cm (64.02\")     Body mass index is 43.92 kg/m².      Physical Exam   Constitutional: She is oriented to person, place, and time. She appears well-developed and well-nourished. She is cooperative.  Non-toxic appearance. She does not have a sickly appearance.   Very pleasant well-appearing older female, obese   HENT:   Head: Normocephalic and atraumatic.   Eyes:   Corrective lenses   Neck: Neck supple.   Pulmonary/Chest: Effort normal.   Musculoskeletal: She exhibits no tenderness or deformity.   Full cervical range of motion, moving all extremities well, strength intact throughout   Neurological: She is alert and oriented to person, place, and time.   Skin: Skin is warm and dry.        Overall, the right anterior incision site is well approximated and intact.  However, at the lateral aspects there do appear to be retained sutures.  With firm pressure, I was able to express white suture material in addition to serosanguineous fluid.  The incision site is tender to palpation with normal surrounding skin   Psychiatric: She has a normal mood and affect. Her behavior is normal. Thought content normal.   Vitals reviewed.    Neurologic Exam     Mental Status   Oriented to person, place, and time.           Assessment/Plan   Independent Review of Radiographic Studies:      CBC from [facility] dated May 7, 2020 reveals a normal white count of 7.13, hemoglobin 15.7, hematocrit 47.2 and platelets 237    CRP 1.10    Sed rate 9      I personally reviewed the images from the following studies.    No new imaging    Medical Decision Making:    She returns the office today for ongoing follow-up of a surgical wound check.  She does report ongoing serosanguineous fluid that occurs intermittently from the lateral aspects of the incision site.  This will occur spontaneously.  As noted above, I was able to express some fluid with what " appeared to be retained sutures with firm palpation.  I also obtained a wound culture as this has not yet been done even though she has been on 2 courses of antibiotics completed over a week ago.  We will have her return to the office to see Dr. Thomas at her next office visit so he can evaluate the site as well.  Overall, it does not appear to be infectious, but there is some superficial irritation. Lab work is stable, with the exception of slightly elevated CRP, which can be elevated in the cases of underlying inflammation in addition to an infectious process.  She is afebrile.  The recent MRI showed no underlying infection.    Plan: Wound culture, continue Betadine 3 times daily, return to office to see Dr. Thomas    Jannet was seen today for wound check.    Diagnoses and all orders for this visit:    Visit for wound check  -     Wound Culture - Surgical Site, Neck; Future    Surgery follow-up examination  -     Wound Culture - Surgical Site, Neck; Future      Return for Follow up as scheduled.

## 2020-05-13 ENCOUNTER — LAB REQUISITION (OUTPATIENT)
Dept: LAB | Facility: HOSPITAL | Age: 65
End: 2020-05-13

## 2020-05-13 ENCOUNTER — OFFICE VISIT (OUTPATIENT)
Dept: NEUROSURGERY | Facility: CLINIC | Age: 65
End: 2020-05-13

## 2020-05-13 VITALS
BODY MASS INDEX: 43.71 KG/M2 | SYSTOLIC BLOOD PRESSURE: 139 MMHG | HEIGHT: 64 IN | TEMPERATURE: 96.4 F | HEART RATE: 102 BPM | DIASTOLIC BLOOD PRESSURE: 82 MMHG | WEIGHT: 256 LBS

## 2020-05-13 DIAGNOSIS — Z09 SURGERY FOLLOW-UP EXAMINATION: ICD-10-CM

## 2020-05-13 DIAGNOSIS — Z51.89 VISIT FOR WOUND CHECK: Primary | ICD-10-CM

## 2020-05-13 DIAGNOSIS — Z51.89 ENCOUNTER FOR OTHER SPECIFIED AFTERCARE: ICD-10-CM

## 2020-05-13 PROCEDURE — 87070 CULTURE OTHR SPECIMN AEROBIC: CPT | Performed by: NURSE PRACTITIONER

## 2020-05-13 PROCEDURE — 87205 SMEAR GRAM STAIN: CPT | Performed by: NURSE PRACTITIONER

## 2020-05-13 PROCEDURE — 99024 POSTOP FOLLOW-UP VISIT: CPT | Performed by: NURSE PRACTITIONER

## 2020-05-16 LAB
BACTERIA SPEC AEROBE CULT: NORMAL
GRAM STN SPEC: NORMAL

## 2020-06-23 ENCOUNTER — TRANSCRIBE ORDERS (OUTPATIENT)
Dept: ADMINISTRATIVE | Facility: HOSPITAL | Age: 65
End: 2020-06-23

## 2020-06-23 DIAGNOSIS — Z00.00 WELLNESS EXAMINATION: Primary | ICD-10-CM

## 2020-07-20 ENCOUNTER — TELEPHONE (OUTPATIENT)
Dept: NEUROSURGERY | Facility: CLINIC | Age: 65
End: 2020-07-20

## 2020-07-20 NOTE — TELEPHONE ENCOUNTER
Patient called. She stated that she is having severe nausea and dizziness when she goes to stand up. She is also having some lower back pain and neck pain. She stated that she sent a [employer] message. I told her that I saw that and it was sent to Dr. Thomas and he is in surgery today. I told her that he checks his messages in between his cases. I also advised her that there was no APRN's to discuss this with in the office right now. I advised her to go to the ER. She said ok and hung up.

## 2020-07-24 ENCOUNTER — TELEMEDICINE (OUTPATIENT)
Dept: FAMILY MEDICINE CLINIC | Facility: CLINIC | Age: 65
End: 2020-07-24

## 2020-07-24 DIAGNOSIS — G89.29 CHRONIC BILATERAL LOW BACK PAIN WITHOUT SCIATICA: ICD-10-CM

## 2020-07-24 DIAGNOSIS — G44.209 TENSION-TYPE HEADACHE, NOT INTRACTABLE, UNSPECIFIED CHRONICITY PATTERN: ICD-10-CM

## 2020-07-24 DIAGNOSIS — R42 VERTIGO: Primary | ICD-10-CM

## 2020-07-24 DIAGNOSIS — M54.50 CHRONIC BILATERAL LOW BACK PAIN WITHOUT SCIATICA: ICD-10-CM

## 2020-07-24 PROCEDURE — 99214 OFFICE O/P EST MOD 30 MIN: CPT | Performed by: FAMILY MEDICINE

## 2020-07-24 RX ORDER — MECLIZINE HYDROCHLORIDE 25 MG/1
25 TABLET ORAL 3 TIMES DAILY PRN
Qty: 30 TABLET | Refills: 1 | Status: SHIPPED | OUTPATIENT
Start: 2020-07-24 | End: 2021-08-16 | Stop reason: HOSPADM

## 2020-07-24 RX ORDER — MELOXICAM 15 MG/1
15 TABLET ORAL DAILY PRN
Qty: 90 TABLET | Refills: 0 | Status: SHIPPED | OUTPATIENT
Start: 2020-07-24 | End: 2020-10-19

## 2020-07-24 NOTE — PROGRESS NOTES
Subjective   Jannet DEE Kitarvind is a 64 y.o. female.     Chief Complaint   Patient presents with   • Dizziness   • Headache        History of Present Illness  Vertigo, dizziness and migraines  She had surgery on her cervical spine and says her surgeon recommended going to PCP for symptoms he does not think related to anything her did. Had anterior discectomy and fusion C4-6 on 3/23/20 complicated by post op wound drainage. Had follow up imaging.   She says her vertigo is severe. It occurs when she gets up too fast or lies down quickly.   It feels like it is getting better.   Says headache coming up the back of the neck since the onset of the vertigo. Has ache today. Symptoms are faint. She moves slow then she can avoid symptoms. If she gets up and walks right away she will get the symptoms. Said doesn't matter if she turns right or left.     Has more low back pain.  Feels that her surgery was well worth it.   More stiffness in the neck recently, wondering about the humidity and rain. Other than the vertigo and some discharge from the wound, now it is closed up, some tenderness and numbness from her scar to chin.   The muscle relaxer helps when gets tight.   She got a three wheel adult bicycle. She is riding but avoiding the heat. Walking as much as she can.     The following portions of the patient's history were reviewed and updated as appropriate: allergies, current medications, past medical history, past social history, past surgical history and problem list.    Review of Systems   Constitutional: Negative for activity change.   Musculoskeletal: Positive for arthralgias, back pain and myalgias.   Neurological: Positive for dizziness and headaches.       Objective   LMP  (LMP Unknown)   Physical Exam  Not available phone visit as video was not working for pt.     Assessment/Plan   Jannet was seen today for dizziness and headache.    Diagnoses and all orders for this visit:    Vertigo    Tension-type headache, not  intractable, unspecified chronicity pattern    Chronic bilateral low back pain without sciatica    Other orders  -     meloxicam (MOBIC) 15 MG tablet; Take 1 tablet by mouth Daily As Needed for Moderate Pain . Take with food and water  -     meclizine (ANTIVERT) 25 MG tablet; Take 1 tablet by mouth 3 (Three) Times a Day As Needed for Dizziness (vertigo symptoms).        Going to let her have some meclizine on hand. Instructed on use, not for routine prolonged use.   Will send epley maneuver for home use.   She is getting better. Has to move slow and that helps too.   Sounds like tension type headache says started in the back of her head, neck soreness.   Her back is bothering her she does have lot of degenerative changes in the spine.  She would like to try mobic. Instructed on risks including GI and renal. Use limited not routinely, take with food and water. She voiced understanding.   20 min was spent in discussion with pt and greater than 50% of that time was spent counseling.   Patient gave consent today for a telehealth video visit as following recommendations of our governor and CDC during the COVID-19 pandemic.

## 2020-07-24 NOTE — PATIENT INSTRUCTIONS
Her is a video for the Epley maneuver. I like this PT team. I think they are thorough and easy to understand. They have all kinds of videos.the second video is kind of nice as well. Lot of details and shows someone who is doing the maneuver alone. Let me know if you having any questions.   [email or web address]    [email or web address]/[email or web address]

## 2020-08-17 NOTE — PROGRESS NOTES
Discharge Planning Assessment  [facility]     Patient Name: Jannet Fierro  MRN: [id number]  Today's Date: 3/24/2020    Admit Date: 3/23/2020    Discharge Needs Assessment     Row Name 03/24/20 0944       Living Environment    Lives With  alone    Current Living Arrangements  home/apartment/condo    Primary Care Provided by  self    Provides Primary Care For  no one    Family Caregiver if Needed  child(linda), adult;parent(s)    Quality of Family Relationships  helpful;involved;supportive    Able to Return to Prior Arrangements  yes       Resource/Environmental Concerns    Resource/Environmental Concerns  none       Transition Planning    Patient/Family Anticipates Transition to  home    Transportation Anticipated  family or friend will provide       Discharge Needs Assessment    Concerns to be Addressed  denies needs/concerns at this time;no discharge needs identified    Equipment Currently Used at Home  bipap/cpap;cane, straight;shower chair    Provided Post Acute Provider List?  N/A Denies needs. Plan is home        Discharge Plan     Row Name 03/24/20 0946       Plan    Plan  Home     Patient/Family in Agreement with Plan  yes    Plan Comments  Introduced self and role of CCP. Facesheet verified. Patient lives alone in one story house. There are 5 steps along with a ramp to enter the home. Handrails present.  Once inside the home, there are no steps. Prior to admission, patient was independent with ADL's, worked FT and drove. Used a straight cane as needed. Stated she has a walker and wheelchair alos if needed. Has a standard tub/shower with shower chair present. Uses CPAP at . Obtains her supplies through [employer]. Denies any supply needs at this times.  Patient has never used a HH agency nor inpatient rehab facility. Has used outpatient PT at [facility]. [place] plan is home with daughter Hansa to assist. Stated her father also lives just down the street and can assist as needed. Denies any needs/equipment.  Patient recieved new hard collar this amGunnar Santo will transport at [place].                  Demographic Summary     Row Name 03/24/20 0983       General Information    Admission Type  other (see comments) Outpatient in a bed    Arrived From  home    Preferred Language  English     Used During This Interaction  no        Functional Status     Row Name 03/24/20 0960       Functional Status    Usual Activity Tolerance  good    Current Activity Tolerance  good       Functional Status, IADL    Medications  independent    Meal Preparation  independent    Housekeeping  independent    Laundry  independent    Shopping  independent       Mental Status    General Appearance WDL  WDL       Employment/    Employment Status  employed full time        Psychosocial     Row Name 03/24/20 0989       Values/Beliefs    Spiritual, Cultural Beliefs, Hinduism Practices, Values that Affect Care  no       Behavior WDL    Behavior WDL  WDL       Emotion Mood WDL    Emotion/Mood/Affect WDL  WDL       Speech WDL    Speech WDL  WDL       Perceptual State WDL    Perceptual State WDL  WDL       Intellectual Performance WDL    Level of Consciousness  Alert       Coping/Stress    Sources of Support  adult child(linda);parent(s)    Reaction to Health Status  adjusting    Understanding of Condition and Treatment  adequate understanding of treatment;adequate understanding of medical condition       Developmental Stage (Eriksson's)    Developmental Stage  Stage 7 (35-65 years/Middle Adulthood) Generativity vs. Stagnation        Abuse/Neglect     Row Name 03/24/20 0971       Personal Safety    Feels Unsafe at Home or Work/School  no    Feels Threatened by Someone  no    Does Anyone Try to Keep You From Having Contact with Others or Doing Things Outside Your Home?  no    Physical Signs of Abuse Present  no                Silvia La, RN     no

## 2020-08-21 NOTE — PROGRESS NOTES
Physical Therapy Daily Progress Note  19 treatments  Subjective     Jannet Fierro reports: here for dry needling treatment.         Objective   See Exercise, Manual, and Modality Logs for complete treatment.     Soft tissue was assessed at (B) upper traps . PT noted point tenderness as well as palpable trigger points within the muslce tissue. On this date patient stated that they would like to undergo a dry needling procedure for the soft tissue dysfunction. Patient was educated on the procedure for dry needling and consent waver was signed. Patient was informed of the risks, possible adverse effects, along with the benefits of TDN.       Assessment/Plan  Patient tolerated all treatment well. Will continue to treat as indicated by primary PT.  Progress per Plan of Care           Manual Therapy:         mins  72889;  Therapeutic Exercise:         mins  06798;     Neuromuscular Candice:        mins  04763;    Therapeutic Activity:          mins  95338;     Gait Training:           mins  20529;     Ultrasound:         mins  13569;    Electrical Stimulation:         mins  69867 ( );  Dry Needling     12     mins self-pay    Timed Treatment:      mins   Total Treatment:     12   mins    Sergio Nair, PT DPT  Physical Therapist  [place] License # [id number]   return to normal health nothing in the vagina x 6 weeks  no heavy lifting x 6 weeks

## 2020-09-14 ENCOUNTER — ANESTHESIA EVENT (OUTPATIENT)
Dept: PERIOP | Facility: HOSPITAL | Age: 65
End: 2020-09-14

## 2020-09-14 ENCOUNTER — APPOINTMENT (OUTPATIENT)
Dept: CT IMAGING | Facility: HOSPITAL | Age: 65
End: 2020-09-14

## 2020-09-14 ENCOUNTER — HOSPITAL ENCOUNTER (OUTPATIENT)
Facility: HOSPITAL | Age: 65
Discharge: HOME | End: 2020-09-15
Attending: EMERGENCY MEDICINE | Admitting: STUDENT IN AN ORGANIZED HEALTH CARE EDUCATION/TRAINING PROGRAM

## 2020-09-14 DIAGNOSIS — R11.0 NAUSEA: ICD-10-CM

## 2020-09-14 DIAGNOSIS — N20.1 URETERAL CALCULUS, LEFT: Primary | ICD-10-CM

## 2020-09-14 DIAGNOSIS — R10.9 LEFT FLANK PAIN: ICD-10-CM

## 2020-09-14 LAB
ANION GAP SERPL CALCULATED.3IONS-SCNC: 12 MMOL/L (ref 5–15)
B PARAPERT DNA SPEC QL NAA+PROBE: NOT DETECTED
B PERT DNA SPEC QL NAA+PROBE: NOT DETECTED
BACTERIA UR QL AUTO: ABNORMAL /HPF
BASOPHILS # BLD AUTO: 0.1 10*3/MM3 (ref 0–0.2)
BASOPHILS NFR BLD AUTO: 0.6 % (ref 0–1.5)
BILIRUB UR QL STRIP: NEGATIVE
BUN SERPL-MCNC: 17 MG/DL (ref 8–23)
BUN SERPL-MCNC: ABNORMAL MG/DL
BUN/CREAT SERPL: ABNORMAL
C PNEUM DNA NPH QL NAA+NON-PROBE: NOT DETECTED
CALCIUM SPEC-SCNC: 9.2 MG/DL (ref 8.6–10.5)
CHLORIDE SERPL-SCNC: 106 MMOL/L (ref 98–107)
CLARITY UR: CLEAR
CO2 SERPL-SCNC: 22 MMOL/L (ref 22–29)
COLOR UR: YELLOW
CREAT SERPL-MCNC: 0.89 MG/DL (ref 0.57–1)
DEPRECATED RDW RBC AUTO: 46.4 FL (ref 37–54)
EOSINOPHIL # BLD AUTO: 0.1 10*3/MM3 (ref 0–0.4)
EOSINOPHIL NFR BLD AUTO: 1 % (ref 0.3–6.2)
ERYTHROCYTE [DISTWIDTH] IN BLOOD BY AUTOMATED COUNT: 14.8 % (ref 12.3–15.4)
FLUAV H1 2009 PAND RNA NPH QL NAA+PROBE: NOT DETECTED
FLUAV H1 HA GENE NPH QL NAA+PROBE: NOT DETECTED
FLUAV H3 RNA NPH QL NAA+PROBE: NOT DETECTED
FLUAV SUBTYP SPEC NAA+PROBE: NOT DETECTED
FLUBV RNA ISLT QL NAA+PROBE: NOT DETECTED
GFR SERPL CREATININE-BSD FRML MDRD: 64 ML/MIN/1.73
GLUCOSE SERPL-MCNC: 125 MG/DL (ref 65–99)
GLUCOSE UR STRIP-MCNC: NEGATIVE MG/DL
HADV DNA SPEC NAA+PROBE: NOT DETECTED
HCOV 229E RNA SPEC QL NAA+PROBE: NOT DETECTED
HCOV HKU1 RNA SPEC QL NAA+PROBE: NOT DETECTED
HCOV NL63 RNA SPEC QL NAA+PROBE: NOT DETECTED
HCOV OC43 RNA SPEC QL NAA+PROBE: NOT DETECTED
HCT VFR BLD AUTO: 47.7 % (ref 34–46.6)
HGB BLD-MCNC: 15.4 G/DL (ref 12–15.9)
HGB UR QL STRIP.AUTO: ABNORMAL
HMPV RNA NPH QL NAA+NON-PROBE: NOT DETECTED
HOLD SPECIMEN: NORMAL
HOLD SPECIMEN: NORMAL
HPIV1 RNA SPEC QL NAA+PROBE: NOT DETECTED
HPIV2 RNA SPEC QL NAA+PROBE: NOT DETECTED
HPIV3 RNA NPH QL NAA+PROBE: NOT DETECTED
HPIV4 P GENE NPH QL NAA+PROBE: NOT DETECTED
HYALINE CASTS UR QL AUTO: ABNORMAL /LPF
KETONES UR QL STRIP: NEGATIVE
LEUKOCYTE ESTERASE UR QL STRIP.AUTO: ABNORMAL
LYMPHOCYTES # BLD AUTO: 0.9 10*3/MM3 (ref 0.7–3.1)
LYMPHOCYTES NFR BLD AUTO: 8.3 % (ref 19.6–45.3)
M PNEUMO IGG SER IA-ACNC: NOT DETECTED
MCH RBC QN AUTO: 28.9 PG (ref 26.6–33)
MCHC RBC AUTO-ENTMCNC: 32.4 G/DL (ref 31.5–35.7)
MCV RBC AUTO: 89.1 FL (ref 79–97)
MONOCYTES # BLD AUTO: 0.8 10*3/MM3 (ref 0.1–0.9)
MONOCYTES NFR BLD AUTO: 7.1 % (ref 5–12)
NEUTROPHILS NFR BLD AUTO: 83 % (ref 42.7–76)
NEUTROPHILS NFR BLD AUTO: 9 10*3/MM3 (ref 1.7–7)
NITRITE UR QL STRIP: NEGATIVE
NRBC BLD AUTO-RTO: 0 /100 WBC (ref 0–0.2)
PH UR STRIP.AUTO: 5.5 [PH] (ref 5–8)
PLATELET # BLD AUTO: 213 10*3/MM3 (ref 140–450)
PMV BLD AUTO: 7.6 FL (ref 6–12)
POTASSIUM SERPL-SCNC: 4.2 MMOL/L (ref 3.5–5.2)
PROT UR QL STRIP: NEGATIVE
RBC # BLD AUTO: 5.35 10*6/MM3 (ref 3.77–5.28)
RBC # UR: ABNORMAL /HPF
REF LAB TEST METHOD: ABNORMAL
RHINOVIRUS RNA SPEC NAA+PROBE: NOT DETECTED
RSV RNA NPH QL NAA+NON-PROBE: NOT DETECTED
SARS-COV-2 RNA NPH QL NAA+NON-PROBE: NOT DETECTED
SODIUM SERPL-SCNC: 140 MMOL/L (ref 136–145)
SP GR UR STRIP: 1.02 (ref 1–1.03)
SQUAMOUS #/AREA URNS HPF: ABNORMAL /HPF
UROBILINOGEN UR QL STRIP: ABNORMAL
WBC # BLD AUTO: 10.8 10*3/MM3 (ref 3.4–10.8)
WBC UR QL AUTO: ABNORMAL /HPF
WHOLE BLOOD HOLD SPECIMEN: NORMAL
WHOLE BLOOD HOLD SPECIMEN: NORMAL

## 2020-09-14 PROCEDURE — 25010000002 KETOROLAC TROMETHAMINE PER 15 MG: Performed by: NURSE PRACTITIONER

## 2020-09-14 PROCEDURE — 74177 CT ABD & PELVIS W/CONTRAST: CPT

## 2020-09-14 PROCEDURE — G0378 HOSPITAL OBSERVATION PER HR: HCPCS

## 2020-09-14 PROCEDURE — 0 IOPAMIDOL PER 1 ML: Performed by: EMERGENCY MEDICINE

## 2020-09-14 PROCEDURE — 0202U NFCT DS 22 TRGT SARS-COV-2: CPT | Performed by: NURSE PRACTITIONER

## 2020-09-14 PROCEDURE — 25010000002 ONDANSETRON PER 1 MG: Performed by: EMERGENCY MEDICINE

## 2020-09-14 PROCEDURE — 99285 EMERGENCY DEPT VISIT HI MDM: CPT

## 2020-09-14 PROCEDURE — 93005 ELECTROCARDIOGRAM TRACING: CPT | Performed by: UROLOGY

## 2020-09-14 PROCEDURE — 81001 URINALYSIS AUTO W/SCOPE: CPT | Performed by: EMERGENCY MEDICINE

## 2020-09-14 PROCEDURE — 25010000002 HYDROMORPHONE PER 4 MG: Performed by: EMERGENCY MEDICINE

## 2020-09-14 PROCEDURE — 96374 THER/PROPH/DIAG INJ IV PUSH: CPT

## 2020-09-14 PROCEDURE — 80048 BASIC METABOLIC PNL TOTAL CA: CPT | Performed by: EMERGENCY MEDICINE

## 2020-09-14 PROCEDURE — 87086 URINE CULTURE/COLONY COUNT: CPT | Performed by: EMERGENCY MEDICINE

## 2020-09-14 PROCEDURE — 25010000002 MORPHINE PER 10 MG: Performed by: EMERGENCY MEDICINE

## 2020-09-14 PROCEDURE — 96375 TX/PRO/DX INJ NEW DRUG ADDON: CPT

## 2020-09-14 PROCEDURE — 85025 COMPLETE CBC W/AUTO DIFF WBC: CPT | Performed by: EMERGENCY MEDICINE

## 2020-09-14 PROCEDURE — 99219 PR INITIAL OBSERVATION CARE/DAY 50 MINUTES: CPT | Performed by: STUDENT IN AN ORGANIZED HEALTH CARE EDUCATION/TRAINING PROGRAM

## 2020-09-14 PROCEDURE — 25010000002 KETOROLAC TROMETHAMINE PER 15 MG: Performed by: PHYSICIAN ASSISTANT

## 2020-09-14 PROCEDURE — 96376 TX/PRO/DX INJ SAME DRUG ADON: CPT

## 2020-09-14 PROCEDURE — 96361 HYDRATE IV INFUSION ADD-ON: CPT

## 2020-09-14 RX ORDER — HYDROMORPHONE HCL 110MG/55ML
1 PATIENT CONTROLLED ANALGESIA SYRINGE INTRAVENOUS ONCE
Status: COMPLETED | OUTPATIENT
Start: 2020-09-14 | End: 2020-09-14

## 2020-09-14 RX ORDER — HYDROCODONE BITARTRATE AND ACETAMINOPHEN 7.5; 325 MG/1; MG/1
1 TABLET ORAL EVERY 6 HOURS PRN
Status: DISCONTINUED | OUTPATIENT
Start: 2020-09-14 | End: 2020-09-15 | Stop reason: HOSPADM

## 2020-09-14 RX ORDER — MORPHINE SULFATE 4 MG/ML
4 INJECTION, SOLUTION INTRAMUSCULAR; INTRAVENOUS ONCE
Status: COMPLETED | OUTPATIENT
Start: 2020-09-14 | End: 2020-09-14

## 2020-09-14 RX ORDER — BISACODYL 10 MG
10 SUPPOSITORY, RECTAL RECTAL DAILY PRN
Status: DISCONTINUED | OUTPATIENT
Start: 2020-09-14 | End: 2020-09-15 | Stop reason: HOSPADM

## 2020-09-14 RX ORDER — KETOROLAC TROMETHAMINE 15 MG/ML
15 INJECTION, SOLUTION INTRAMUSCULAR; INTRAVENOUS ONCE
Status: COMPLETED | OUTPATIENT
Start: 2020-09-14 | End: 2020-09-14

## 2020-09-14 RX ORDER — ACETAMINOPHEN 325 MG/1
650 TABLET ORAL EVERY 4 HOURS PRN
Status: DISCONTINUED | OUTPATIENT
Start: 2020-09-14 | End: 2020-09-15 | Stop reason: HOSPADM

## 2020-09-14 RX ORDER — ONDANSETRON 2 MG/ML
4 INJECTION INTRAMUSCULAR; INTRAVENOUS EVERY 6 HOURS PRN
Status: DISCONTINUED | OUTPATIENT
Start: 2020-09-14 | End: 2020-09-15 | Stop reason: HOSPADM

## 2020-09-14 RX ORDER — SODIUM CHLORIDE 0.9 % (FLUSH) 0.9 %
10 SYRINGE (ML) INJECTION EVERY 12 HOURS SCHEDULED
Status: DISCONTINUED | OUTPATIENT
Start: 2020-09-14 | End: 2020-09-15 | Stop reason: HOSPADM

## 2020-09-14 RX ORDER — ACETAMINOPHEN 650 MG/1
650 SUPPOSITORY RECTAL EVERY 4 HOURS PRN
Status: DISCONTINUED | OUTPATIENT
Start: 2020-09-14 | End: 2020-09-15 | Stop reason: HOSPADM

## 2020-09-14 RX ORDER — DULOXETIN HYDROCHLORIDE 30 MG/1
60 CAPSULE, DELAYED RELEASE ORAL DAILY
Status: DISCONTINUED | OUTPATIENT
Start: 2020-09-14 | End: 2020-09-15 | Stop reason: HOSPADM

## 2020-09-14 RX ORDER — ACETAMINOPHEN 160 MG/5ML
650 SOLUTION ORAL EVERY 4 HOURS PRN
Status: DISCONTINUED | OUTPATIENT
Start: 2020-09-14 | End: 2020-09-15 | Stop reason: HOSPADM

## 2020-09-14 RX ORDER — ASPIRIN 81 MG/1
81 TABLET ORAL DAILY
Status: DISCONTINUED | OUTPATIENT
Start: 2020-09-14 | End: 2020-09-15 | Stop reason: HOSPADM

## 2020-09-14 RX ORDER — ALUMINA, MAGNESIA, AND SIMETHICONE 2400; 2400; 240 MG/30ML; MG/30ML; MG/30ML
15 SUSPENSION ORAL EVERY 6 HOURS PRN
Status: DISCONTINUED | OUTPATIENT
Start: 2020-09-14 | End: 2020-09-15 | Stop reason: HOSPADM

## 2020-09-14 RX ORDER — KETOROLAC TROMETHAMINE 15 MG/ML
15 INJECTION, SOLUTION INTRAMUSCULAR; INTRAVENOUS EVERY 6 HOURS PRN
Status: DISCONTINUED | OUTPATIENT
Start: 2020-09-14 | End: 2020-09-15 | Stop reason: HOSPADM

## 2020-09-14 RX ORDER — SODIUM CHLORIDE 0.9 % (FLUSH) 0.9 %
10 SYRINGE (ML) INJECTION AS NEEDED
Status: DISCONTINUED | OUTPATIENT
Start: 2020-09-14 | End: 2020-09-15 | Stop reason: HOSPADM

## 2020-09-14 RX ORDER — ONDANSETRON 2 MG/ML
4 INJECTION INTRAMUSCULAR; INTRAVENOUS ONCE
Status: COMPLETED | OUTPATIENT
Start: 2020-09-14 | End: 2020-09-14

## 2020-09-14 RX ORDER — CYCLOBENZAPRINE HCL 10 MG
10 TABLET ORAL 2 TIMES DAILY PRN
Status: DISCONTINUED | OUTPATIENT
Start: 2020-09-14 | End: 2020-09-15 | Stop reason: HOSPADM

## 2020-09-14 RX ORDER — BISACODYL 5 MG/1
5 TABLET, DELAYED RELEASE ORAL DAILY PRN
Status: DISCONTINUED | OUTPATIENT
Start: 2020-09-14 | End: 2020-09-15 | Stop reason: HOSPADM

## 2020-09-14 RX ORDER — DEXTROSE AND SODIUM CHLORIDE 5; .45 G/100ML; G/100ML
125 INJECTION, SOLUTION INTRAVENOUS CONTINUOUS
Status: DISCONTINUED | OUTPATIENT
Start: 2020-09-14 | End: 2020-09-15 | Stop reason: HOSPADM

## 2020-09-14 RX ORDER — MECLIZINE HYDROCHLORIDE 25 MG/1
25 TABLET ORAL 3 TIMES DAILY PRN
Status: DISCONTINUED | OUTPATIENT
Start: 2020-09-14 | End: 2020-09-15 | Stop reason: HOSPADM

## 2020-09-14 RX ORDER — CHOLECALCIFEROL (VITAMIN D3) 125 MCG
5 CAPSULE ORAL NIGHTLY PRN
Status: DISCONTINUED | OUTPATIENT
Start: 2020-09-14 | End: 2020-09-15 | Stop reason: HOSPADM

## 2020-09-14 RX ORDER — ONDANSETRON 4 MG/1
4 TABLET, FILM COATED ORAL EVERY 6 HOURS PRN
Status: DISCONTINUED | OUTPATIENT
Start: 2020-09-14 | End: 2020-09-15 | Stop reason: HOSPADM

## 2020-09-14 RX ORDER — TAMSULOSIN HYDROCHLORIDE 0.4 MG/1
0.4 CAPSULE ORAL 2 TIMES DAILY
Status: DISCONTINUED | OUTPATIENT
Start: 2020-09-14 | End: 2020-09-15 | Stop reason: HOSPADM

## 2020-09-14 RX ADMIN — MORPHINE SULFATE 4 MG: 4 INJECTION INTRAVENOUS at 06:31

## 2020-09-14 RX ADMIN — SODIUM CHLORIDE 150 MG: 9 INJECTION, SOLUTION INTRAVENOUS at 09:10

## 2020-09-14 RX ADMIN — TAMSULOSIN HYDROCHLORIDE 0.4 MG: 0.4 CAPSULE ORAL at 20:47

## 2020-09-14 RX ADMIN — SODIUM CHLORIDE 1000 ML: 900 INJECTION, SOLUTION INTRAVENOUS at 06:31

## 2020-09-14 RX ADMIN — HYDROCODONE BITARTRATE AND ACETAMINOPHEN 1 TABLET: 7.5; 325 TABLET ORAL at 16:16

## 2020-09-14 RX ADMIN — KETOROLAC TROMETHAMINE 15 MG: 15 INJECTION, SOLUTION INTRAMUSCULAR; INTRAVENOUS at 09:10

## 2020-09-14 RX ADMIN — HYDROMORPHONE HYDROCHLORIDE 1 MG: 2 INJECTION, SOLUTION INTRAMUSCULAR; INTRAVENOUS; SUBCUTANEOUS at 07:18

## 2020-09-14 RX ADMIN — DEXTROSE AND SODIUM CHLORIDE 125 ML/HR: 5; 450 INJECTION, SOLUTION INTRAVENOUS at 16:16

## 2020-09-14 RX ADMIN — Medication 10 ML: at 11:59

## 2020-09-14 RX ADMIN — KETOROLAC TROMETHAMINE 15 MG: 15 INJECTION, SOLUTION INTRAMUSCULAR; INTRAVENOUS at 13:07

## 2020-09-14 RX ADMIN — IOPAMIDOL 100 ML: 755 INJECTION, SOLUTION INTRAVENOUS at 08:13

## 2020-09-14 RX ADMIN — Medication 10 ML: at 20:47

## 2020-09-14 RX ADMIN — ONDANSETRON 4 MG: 2 INJECTION INTRAMUSCULAR; INTRAVENOUS at 06:31

## 2020-09-14 RX ADMIN — DULOXETINE HYDROCHLORIDE 60 MG: 30 CAPSULE, DELAYED RELEASE ORAL at 11:59

## 2020-09-14 RX ADMIN — KETOROLAC TROMETHAMINE 15 MG: 15 INJECTION, SOLUTION INTRAMUSCULAR; INTRAVENOUS at 20:47

## 2020-09-14 RX ADMIN — Medication 5 MG: at 20:47

## 2020-09-14 NOTE — H&P
[facility] Medicine Services      Patient Name: Jannet Fierro  : 1955  MRN: [id number]  Primary Care Physician: Sandra Roe MD  Date of admission: 2020    Patient Care Team:  Sandra Roe MD as PCP - General (Family Medicine)  Ammy José MD as Consulting Physician (Pulmonary Disease)          Subjective   History Present Illness     Chief Complaint:   Chief Complaint   Patient presents with   • Flank Pain       Ms. Fierro is a 65 y.o. female with history of kidney stones who presented to [facility] ER 2020 with complaints of left flank pain. The pain started around midnight and was described as a sharp, stabbing pain with radiation into left abdomen. She had associated nausea and dry heaves. She denied any hematuria. She denied any fever. She has not had a kidney stone since . She denied any known Covid-19 contacts. She works from home for [employer].     In the ER the patient had CT abdomen that showed 6x3mm obstructing left ureterovesical junction stone with mild left hydronephrosis. WBC normal. UA showed moderate blood, small leukocytes, too numerous RBCs, and 6-12 wbcs, no bacteria. She was given IVFs 1L, IV morphine, dilaudid, and toradol. Her pain continued and she was started on one time lidocaine drip. Her pain improved from 8/10 upon ER arrival to 2/10 currently. She was admitted to the hospitalist group for further treatment. Urology Dr. Johnson was contacted and wants patient to remain NPO.       Review of Systems   Constitution: Negative.   HENT: Negative.    Eyes: Negative.    Cardiovascular: Negative.    Respiratory: Negative.    Endocrine: Negative.    Hematologic/Lymphatic: Negative.    Skin: Negative.    Gastrointestinal: Negative.    Genitourinary: Positive for flank pain.   Neurological: Negative.    Psychiatric/Behavioral: Negative.    Allergic/Immunologic: Negative.    All other systems reviewed and are negative.          Personal History     Past  "Medical History:   Past Medical History:   Diagnosis Date   • Back pain    • Cervical radiculitis 2/11/2020   • Chronic kidney disease     stones   • Chronic midline low back pain without sciatica 11/29/2017   • Compression fracture of first lumbar vertebra (CMS/HCC)     2015 history   • Hard to intubate     be careful pt states she is to tell she has a small mouth   • Mild single current episode of major depressive disorder (CMS/HCC) 11/29/2017   • Neck pain    • Sleep apnea     cpap       Surgical History:      Past Surgical History:   Procedure Laterality Date   • ANKLE SURGERY Right     hardware   • ANTERIOR CERVICAL DISCECTOMY W/ FUSION N/A 3/23/2020    Procedure: Cervical 4 to cervical 5 and cervical 5 to cervical 6 anterior cervical discectomy, fusion and instrumentation;  Surgeon: Segundo Thomas MD;  Location: [facility];  Service: Neurosurgery;  Laterality: N/A;   • KIDNEY STONE SURGERY     • KNEE SURGERY Right     hardware    torn acl & tendons and ligaments with pin and \"bungee cord\"           Family History: family history includes Heart attack (age of onset: 71) in her mother; Hypertension in her father; Hypothyroidism in her mother; Skin cancer (age of onset: 91) in her father; Thyroid disease (age of onset: 31) in her daughter.     Social History:  reports that she has never smoked. She has never used smokeless tobacco. She reports current alcohol use. She reports that she does not use drugs.      Medications:  Prior to Admission medications    Medication Sig Start Date End Date Taking? Authorizing Provider   aspirin (Aspirin Low Dose) 81 MG EC tablet Take 1 tablet by mouth Daily. 4/16/20   Sandra Roe MD   cyclobenzaprine (FLEXERIL) 10 MG tablet Take 1 tablet by mouth 2 (Two) Times a Day As Needed for Muscle Spasms. 4/27/20   Betina Jaramillo APRN   DULoxetine (CYMBALTA) 60 MG capsule TAKE 1 CAPSULE BY MOUTH DAILY 4/8/20   Sandra Roe MD   meclizine (ANTIVERT) 25 MG tablet Take 1 tablet by " mouth 3 (Three) Times a Day As Needed for Dizziness (vertigo symptoms). 7/24/20   Sandra Roe MD   meloxicam (MOBIC) 15 MG tablet Take 1 tablet by mouth Daily As Needed for Moderate Pain . Take with food and water 7/24/20   Sandra Roe MD       Allergies:    Allergies   Allergen Reactions   • Methocarbamol Itching       Objective   Objective     Vital Signs  Temp:  [97.9 °F (36.6 °C)] 97.9 °F (36.6 °C)  Heart Rate:  [] 90  Resp:  [20] 20  BP: (124-170)/(57-81) 152/59  SpO2:  [94 %-98 %] 94 %  on   ;   Device (Oxygen Therapy): room air  Body mass index is 43.82 kg/m².    Physical Exam  Vitals signs and nursing note reviewed.   Constitutional:       General: She is not in acute distress.     Appearance: Normal appearance. She is well-developed. She is obese.   HENT:      Head: Normocephalic and atraumatic.      Right Ear: External ear normal.      Left Ear: External ear normal.      Nose: Nose normal.      Mouth/Throat:      Pharynx: No oropharyngeal exudate.   Eyes:      Conjunctiva/sclera: Conjunctivae normal.      Pupils: Pupils are equal, round, and reactive to light.   Neck:      Musculoskeletal: Normal range of motion.      Thyroid: No thyromegaly.      Vascular: No JVD.      Trachea: No tracheal deviation.   Cardiovascular:      Rate and Rhythm: Normal rate and regular rhythm.      Heart sounds: Normal heart sounds. No murmur. No friction rub. No gallop.    Pulmonary:      Effort: Pulmonary effort is normal. No respiratory distress.      Breath sounds: Normal breath sounds. No stridor. No wheezing or rales.   Abdominal:      General: Bowel sounds are normal. There is no distension.      Palpations: Abdomen is soft.      Tenderness: There is no abdominal tenderness. There is no guarding.   Musculoskeletal: Normal range of motion.         General: No tenderness or deformity.   Lymphadenopathy:      Cervical: No cervical adenopathy.   Skin:     General: Skin is warm and dry.      Findings: No erythema  or rash.   Neurological:      Mental Status: She is alert and oriented to person, place, and time.      Cranial Nerves: No cranial nerve deficit.      Sensory: No sensory deficit.      Motor: No abnormal muscle tone.   Psychiatric:         Behavior: Behavior normal.         Results Review:  I have personally reviewed most recent lab results and radiology images and interpretations and agree with findings.    Results from last 7 days   Lab Units 09/14/20  0614   WBC 10*3/mm3 10.80   HEMOGLOBIN g/dL 15.4   HEMATOCRIT % 47.7*   PLATELETS 10*3/mm3 213     Results from last 7 days   Lab Units 09/14/20  0614   SODIUM mmol/L 140   POTASSIUM mmol/L 4.2   CHLORIDE mmol/L 106   CO2 mmol/L 22.0   BUN  17   CREATININE mg/dL 0.89   GLUCOSE mg/dL 125*   CALCIUM mg/dL 9.2     Estimated Creatinine Clearance: 79.8 mL/min (by C-G formula based on SCr of 0.89 mg/dL).  Brief Urine Lab Results  (Last result in the past 365 days)      Color   Clarity   Blood   Leuk Est   Nitrite   Protein   CREAT   Urine HCG        09/14/20 0632 Yellow Clear Moderate (2+) Small (1+) Negative Negative               Microbiology Results (last 10 days)     ** No results found for the last 240 hours. **          ECG/EMG Results (most recent)     None               Results for orders placed during the hospital encounter of 05/25/16   Adult transthoracic echo complete with contrast    Narrative · Left ventricular wall thickness is consistent with borderline concentric   hypertrophy.  · All left ventricular wall segments contract normally.  · Left ventricular function is normal. Estimated EF = 65%.  · Right ventricular cavity is mildly dilated.  · Mildly reduced right ventricular systolic function noted.          Ct Abdomen Pelvis With Contrast    Result Date: 9/14/2020   1. 6 x 3 mm obstructing left ureterovesical junction stone with mild left hydronephrosis. 2. Nonobstructing left renal stone. 3. Left renal cyst. 4. Uncomplicated cholelithiasis. 5. Bibasilar  bandlike atelectatic changes. 6. Chronic L1 compression fracture.    Electronically Signed By-Dr. Dominique Prasad MD On:9/14/2020 8:27 AM This report was finalized on [id number] by Dr. Dominique Prasad MD.        Estimated Creatinine Clearance: 79.8 mL/min (by C-G formula based on SCr of 0.89 mg/dL).    Assessment/Plan   Assessment/Plan       Active Hospital Problems    Diagnosis  POA   • **Ureteral calculus, left [N20.1]  Yes     Priority: High   • Acute left flank pain [R10.9]  Yes     Priority: High   • Sleep apnea [G47.30]  Yes   • Mild single current episode of major depressive disorder (CMS/HCC) [F32.0]  Yes   • Chronic midline low back pain with bilateral sciatica [M54.41, M54.42, G89.29]  Yes      Resolved Hospital Problems   No resolved problems to display.     Acute Left flank pain secondary to Left obstructing renal stone with mild hydronephrosis  -CT abdomen: 6x3mm obstructing left ureterovesical junction stone with mild left hydronephrosis.   -WBC normal, BUN/Cr normal  -UA showed moderate blood, small leukocytes, too numerous RBCs, and 6-12 wbcs, no bacteria. Follow urine culture   -1L IVFs, IV morphine, dilaudid, and toradol. Pain continued and she was given a one time lidocaine drip   -cont prn toradol  -check preop Covid    Chronic low back pain, arthritis  -hold home mobic for now    Depression  -cont home cymbalta     DEVONTE  -cpap qhs         VTE Prophylaxis - SCDs    CODE STATUS:    Code Status and Medical Interventions:   Ordered at: 09/14/20 0957     Level Of Support Discussed With:    Patient     Code Status:    CPR     Medical Interventions (Level of Support Prior to Arrest):    Full       This patient has been examined wearing appropriate Personal Protective Equipment 09/14/20      I discussed the patient's findings and my recommendations with patient.        Electronically signed by PATRIZIA Ortiz, 09/14/20, 9:16 AM EDT.  [facility] Hospitalist Team

## 2020-09-14 NOTE — PROGRESS NOTES
[facility] Services Daily Progress Note      Hospitalist Team  LOS 0 days      Patient Care Team:  Sandra Roe MD as PCP - General (Family Medicine)  Ammy José MD as Consulting Physician (Pulmonary Disease)    Patient Location: 220/1      Subjective   Subjective     Chief Complaint / Subjective  Chief Complaint   Patient presents with   • Flank Pain         Brief Synopsis of Hospital Course/HPI  Ms. Fierro is a 65 y.o. female with history of kidney stones who presented to [facility] ER 9/14/2020 with complaints of left flank pain. The pain started around midnight and was described as a sharp, stabbing pain with radiation into left abdomen. She had associated nausea and dry heaves. She denied any hematuria. She denied any fever. She has not had a kidney stone since 2011. She denied any known Covid-19 contacts. She works from home for [employer].      In the ER the patient had CT abdomen that showed 6x3mm obstructing left ureterovesical junction stone with mild left hydronephrosis. WBC normal. UA showed moderate blood, small leukocytes, too numerous RBCs, and 6-12 wbcs, no bacteria. She was given IVFs 1L, IV morphine, dilaudid, and toradol. Her pain continued and she was started on one time lidocaine drip. Her pain improved from 8/10 upon ER arrival to 2/10 currently. She was admitted to the hospitalist group for further treatment. Urology Dr. Johnson was contacted and wants patient to remain NPO.          Date::    09/14/2020: Patient seen and examined at bedside, pain improved.  Tolerating IVF, urology plans surgery tomorrow.          Review of Systems   Constitution: Negative for chills, decreased appetite and fever.   HENT: Negative for ear pain, hearing loss and sore throat.    Eyes: Negative for blurred vision, double vision and pain.   Cardiovascular: Negative for chest pain, dyspnea on exertion and leg swelling.   Respiratory: Negative for cough, hemoptysis and shortness of  "breath.    Endocrine: Negative for polydipsia, polyphagia and polyuria.   Hematologic/Lymphatic: Negative for bleeding problem.   Skin: Negative for flushing, itching and rash.   Musculoskeletal: Negative for arthritis, back pain and falls.   Gastrointestinal: Positive for abdominal pain and nausea. Negative for bloating, constipation and diarrhea.   Genitourinary: Positive for flank pain. Negative for dysuria and hematuria.   Neurological: Negative for headaches, light-headedness and loss of balance.   Psychiatric/Behavioral: Negative for altered mental status, depression and hallucinations.   Allergic/Immunologic: Negative for environmental allergies.         Objective   Objective      Vital Signs  Temp:  [97.5 °F (36.4 °C)-97.9 °F (36.6 °C)] 97.5 °F (36.4 °C)  Heart Rate:  [] 92  Resp:  [17-20] 17  BP: (124-170)/(57-98) 128/74  Oxygen Therapy  SpO2: 93 %  Pulse Oximetry Type: Intermittent  Device (Oxygen Therapy): room air  Flowsheet Rows      First Filed Value   Admission Height  162.6 cm (64\") Documented at 09/14/2020 0601   Admission Weight  116 kg (255 lb 4.7 oz) Documented at 09/14/2020 0601        Intake & Output (last 3 days)     None        Lines, Drains & Airways    Active LDAs     Name:   Placement date:   Placement time:   Site:   Days:    Peripheral IV 09/14/20 1045 Anterior;Right Forearm   09/14/20    1045    Forearm   less than 1                  Physical Exam:    Physical Exam  Constitutional:       General: She is not in acute distress.     Appearance: Normal appearance. She is not ill-appearing.   HENT:      Head: Normocephalic and atraumatic.      Nose: No congestion or rhinorrhea.      Mouth/Throat:      Mouth: Mucous membranes are moist.      Pharynx: No oropharyngeal exudate or posterior oropharyngeal erythema.   Eyes:      General: No scleral icterus.     Extraocular Movements: Extraocular movements intact.   Neck:      Musculoskeletal: Normal range of motion and neck supple. No neck " rigidity or muscular tenderness.   Cardiovascular:      Rate and Rhythm: Normal rate and regular rhythm.      Heart sounds: No murmur. No friction rub. No gallop.    Pulmonary:      Effort: Pulmonary effort is normal. No respiratory distress.      Breath sounds: Normal breath sounds. No wheezing or rales.   Abdominal:      General: Bowel sounds are normal. There is no distension.      Palpations: Abdomen is soft.      Tenderness: There is abdominal tenderness.   Musculoskeletal: Normal range of motion.         General: Tenderness (left flank tender to palpation) present.   Skin:     General: Skin is warm.      Coloration: Skin is not jaundiced.      Findings: No bruising.   Neurological:      General: No focal deficit present.      Mental Status: She is alert and oriented to person, place, and time.      Cranial Nerves: No cranial nerve deficit.   Psychiatric:         Mood and Affect: Mood normal.         Behavior: Behavior normal.         Thought Content: Thought content normal.               Procedures:    Procedure(s):  CYSTOSCOPY URETEROSCOPY RETROGRADE PYELOGRAM HOLMIUM LASER STENT INSERTION          Results Review:     I reviewed the patient's new clinical results.      Lab Results (last 24 hours)     Procedure Component Value Units Date/Time    COVID PRE-OP / PRE-PROCEDURE SCREENING ORDER (NO ISOLATION) - Swab, Nasopharynx [[id number]]  (Normal) Collected: 09/14/20 1238    Specimen: Swab from Nasopharynx Updated: 09/14/20 6493    Narrative:      The following orders were created for panel order COVID PRE-OP / PRE-PROCEDURE SCREENING ORDER (NO ISOLATION) - Swab, Nasopharynx.  Procedure                               Abnormality         Status                     ---------                               -----------         ------                     Respiratory Panel PCR w/...[[id number]]  Normal              Final result                 Please view results for these tests on the individual orders.    Respiratory  Panel PCR w/COVID-19(SARS-CoV-2) [facility] In-House, NP Swab in UTM/VTM, 3-4 HR TAT - Swab, Nasopharynx [[id number]]  (Normal) Collected: 09/14/20 1238    Specimen: Swab from Nasopharynx Updated: 09/14/20 1458     ADENOVIRUS, PCR Not Detected     Coronavirus 229E Not Detected     Coronavirus HKU1 Not Detected     Coronavirus NL63 Not Detected     Coronavirus OC43 Not Detected     COVID19 Not Detected     Human Metapneumovirus Not Detected     Human Rhinovirus/Enterovirus Not Detected     Influenza A PCR Not Detected     Influenza A H1 Not Detected     Influenza A H1 2009 PCR Not Detected     Influenza A H3 Not Detected     Influenza B PCR Not Detected     Parainfluenza Virus 1 Not Detected     Parainfluenza Virus 2 Not Detected     Parainfluenza Virus 3 Not Detected     Parainfluenza Virus 4 Not Detected     RSV, PCR Not Detected     Bordetella pertussis pcr Not Detected     Bordetella parapertussis PCR Not Detected     Chlamydophila pneumoniae PCR Not Detected     Mycoplasma pneumo by PCR Not Detected    Narrative:      Fact sheet for providers: https://docs.[employer]/wp-content/uploads/MME2179-3679-AT5.1-EUA-Provider-Fact-Sheet-3.pdf    Fact sheet for patients: https://docs.[employer]/wp-content/uploads/OPD9540-2437-OV9.1-EUA-Patient-Fact-Sheet-1.pdf    BUN [[id number]]  (Normal) Collected: 09/14/20 0614    Specimen: Blood Updated: 09/14/20 0739     BUN 17 mg/dL     Basic Metabolic Panel [[id number]]  (Abnormal) Collected: 09/14/20 0614    Specimen: Blood Updated: 09/14/20 0727     Glucose 125 mg/dL      BUN --     Comment: Testing performed by alternate method        Creatinine 0.89 mg/dL      Sodium 140 mmol/L      Potassium 4.2 mmol/L      Chloride 106 mmol/L      CO2 22.0 mmol/L      Calcium 9.2 mg/dL      eGFR Non African Amer 64 mL/min/1.73      BUN/Creatinine Ratio --     Comment: Testing not performed        Anion Gap 12.0 mmol/L     Narrative:      GFR Normal >60  Chronic Kidney Disease  <60  Kidney Failure <15      [place] Draw [[id number]] Collected: 09/14/20 0614    Specimen: Blood Updated: 09/14/20 0715    Narrative:      The following orders were created for panel order [place] Draw.  Procedure                               Abnormality         Status                     ---------                               -----------         ------                     Light Blue Top[[id number]]                                   Final result               Green Top (Gel)[[id number]]                                  Final result               Lavender Top[[id number]]                                     Final result               Gold Top - SST[[id number]]                                   Final result                 Please view results for these tests on the individual orders.    Light Blue Top [[id number]] Collected: 09/14/20 0614    Specimen: Blood Updated: 09/14/20 0715     Extra Tube hold for add-on     Comment: Auto resulted       Gold Top - SST [[id number]] Collected: 09/14/20 0614    Specimen: Blood Updated: 09/14/20 0715     Extra Tube Hold for add-ons.     Comment: Auto resulted.       Green Top (Gel) [[id number]] Collected: 09/14/20 0614    Specimen: Blood Updated: 09/14/20 0715     Extra Tube Hold for add-ons.     Comment: Auto resulted.       Lavender Top [[id number]] Collected: 09/14/20 0614    Specimen: Blood Updated: 09/14/20 0715     Extra Tube hold for add-on     Comment: Auto resulted       Urinalysis With Culture If Indicated - Urine, Clean Catch [[id number]]  (Abnormal) Collected: 09/14/20 0632    Specimen: Urine, Clean Catch Updated: 09/14/20 0643     Color, UA Yellow     Appearance, UA Clear     pH, UA 5.5     Specific Gravity, UA 1.021     Glucose, UA Negative     Ketones, UA Negative     Bilirubin, UA Negative     Blood, UA Moderate (2+)     Protein, UA Negative     Leuk Esterase, UA Small (1+)     Nitrite, UA Negative     Urobilinogen, UA 0.2 E.U./dL    Urinalysis, Microscopic Only - Urine,  Clean Catch [[id number]]  (Abnormal) Collected: 09/14/20 0632    Specimen: Urine, Clean Catch Updated: 09/14/20 0643     RBC, UA Too Numerous to Count /HPF      WBC, UA 6-12 /HPF      Bacteria, UA None Seen /HPF      Squamous Epithelial Cells, UA 0-2 /HPF      Hyaline Casts, UA None Seen /LPF      Methodology Automated Microscopy    Urine Culture - Urine, Urine, Clean Catch [[id number]] Collected: 09/14/20 0632    Specimen: Urine, Clean Catch Updated: 09/14/20 0643    CBC & Differential [[id number]]  (Abnormal) Collected: 09/14/20 0614    Specimen: Blood Updated: 09/14/20 0637    Narrative:      The following orders were created for panel order CBC & Differential.  Procedure                               Abnormality         Status                     ---------                               -----------         ------                     CBC Auto Differential[[id number]]        Abnormal            Final result                 Please view results for these tests on the individual orders.    CBC Auto Differential [[id number]]  (Abnormal) Collected: 09/14/20 0614    Specimen: Blood Updated: 09/14/20 0637     WBC 10.80 10*3/mm3      RBC 5.35 10*6/mm3      Hemoglobin 15.4 g/dL      Hematocrit 47.7 %      MCV 89.1 fL      MCH 28.9 pg      MCHC 32.4 g/dL      RDW 14.8 %      RDW-SD 46.4 fl      MPV 7.6 fL      Platelets 213 10*3/mm3      Neutrophil % 83.0 %      Lymphocyte % 8.3 %      Monocyte % 7.1 %      Eosinophil % 1.0 %      Basophil % 0.6 %      Neutrophils, Absolute 9.00 10*3/mm3      Lymphocytes, Absolute 0.90 10*3/mm3      Monocytes, Absolute 0.80 10*3/mm3      Eosinophils, Absolute 0.10 10*3/mm3      Basophils, Absolute 0.10 10*3/mm3      nRBC 0.0 /100 WBC         No results found for: HGBA1C            No results found for: LIPASE  Lab Results   Component Value Date    CHLPL 160 09/04/2019    TRIG 115 09/04/2019    HDL 48 09/04/2019    LDL 89 09/04/2019       No results found for: INTRAOP, PREDX, FINALDX,  COMDX    Microbiology Results (last 10 days)     Procedure Component Value - Date/Time    COVID PRE-OP / PRE-PROCEDURE SCREENING ORDER (NO ISOLATION) - Swab, Nasopharynx [[id number]]  (Normal) Collected: 09/14/20 1238    Lab Status: Final result Specimen: Swab from Nasopharynx Updated: 09/14/20 6401    Narrative:      The following orders were created for panel order COVID PRE-OP / PRE-PROCEDURE SCREENING ORDER (NO ISOLATION) - Swab, Nasopharynx.  Procedure                               Abnormality         Status                     ---------                               -----------         ------                     Respiratory Panel PCR w/...[[id number]]  Normal              Final result                 Please view results for these tests on the individual orders.    Respiratory Panel PCR w/COVID-19(SARS-CoV-2) [facility] In-House, NP Swab in UTM/VTM, 3-4 HR TAT - Swab, Nasopharynx [[id number]]  (Normal) Collected: 09/14/20 1238    Lab Status: Final result Specimen: Swab from Nasopharynx Updated: 09/14/20 1457     ADENOVIRUS, PCR Not Detected     Coronavirus 229E Not Detected     Coronavirus HKU1 Not Detected     Coronavirus NL63 Not Detected     Coronavirus OC43 Not Detected     COVID19 Not Detected     Human Metapneumovirus Not Detected     Human Rhinovirus/Enterovirus Not Detected     Influenza A PCR Not Detected     Influenza A H1 Not Detected     Influenza A H1 2009 PCR Not Detected     Influenza A H3 Not Detected     Influenza B PCR Not Detected     Parainfluenza Virus 1 Not Detected     Parainfluenza Virus 2 Not Detected     Parainfluenza Virus 3 Not Detected     Parainfluenza Virus 4 Not Detected     RSV, PCR Not Detected     Bordetella pertussis pcr Not Detected     Bordetella parapertussis PCR Not Detected     Chlamydophila pneumoniae PCR Not Detected     Mycoplasma pneumo by PCR Not Detected    Narrative:      Fact sheet for providers:  https://docs.[employer]wp-content/uploads/LDH2428-4691-JD0.1-EUA-Provider-Fact-Sheet-3.pdf    Fact sheet for patients: https://docs.[employer]wp-content/uploads/SKL7802-1352-FY4.1-EUA-Patient-Fact-Sheet-1.pdf          ECG/EMG Results (most recent)     Procedure Component Value Units Date/Time    ECG 12 Lead [[id number]] Collected: 09/14/20 1641     Updated: 09/14/20 1648    Narrative:      HEART RATE= 101  bpm  RR Interval= 592  ms  NH Interval= 186  ms  P Horizontal Axis= -1  deg  P Front Axis= 50  deg  QRSD Interval= 87  ms  QT Interval= 345  ms  QRS Axis= 68  deg  T Wave Axis= -2  deg  - OTHERWISE NORMAL ECG -  Sinus tachycardia  When compared with ECG of 25-Mar-2020 10:09:52,  No significant change  Electronically Signed By:   Date and Time of Study: 2020-09-14 16:41:10               Results for orders placed during the hospital encounter of 05/25/16   Adult transthoracic echo complete with contrast    Narrative · Left ventricular wall thickness is consistent with borderline concentric   hypertrophy.  · All left ventricular wall segments contract normally.  · Left ventricular function is normal. Estimated EF = 65%.  · Right ventricular cavity is mildly dilated.  · Mildly reduced right ventricular systolic function noted.          Ct Abdomen Pelvis With Contrast    Result Date: 9/14/2020   1. 6 x 3 mm obstructing left ureterovesical junction stone with mild left hydronephrosis. 2. Nonobstructing left renal stone. 3. Left renal cyst. 4. Uncomplicated cholelithiasis. 5. Bibasilar bandlike atelectatic changes. 6. Chronic L1 compression fracture.    Electronically Signed By-Dr. Dominique Prasad MD On:9/14/2020 8:27 AM This report was finalized on [id number] by Dr. Dominique Prasad MD.          Xrays, labs reviewed personally by physician.    Medication Review:   I have reviewed the patient's current medication list      Scheduled Meds  aspirin, 81 mg, Oral, Daily  DULoxetine, 60 mg, Oral, Daily  sodium  chloride, 10 mL, Intravenous, Q12H  tamsulosin, 0.4 mg, Oral, BID        Meds Infusions  dextrose 5 % and sodium chloride 0.45 %, 125 mL/hr, Last Rate: 125 mL/hr (09/14/20 1616)        Meds PRN  •  acetaminophen **OR** acetaminophen **OR** acetaminophen  •  aluminum-magnesium hydroxide-simethicone  •  bisacodyl  •  bisacodyl  •  cyclobenzaprine  •  HYDROcodone-acetaminophen  •  influenza vaccine  •  ketorolac  •  magnesium hydroxide  •  meclizine  •  melatonin  •  ondansetron **OR** ondansetron  •  sodium chloride  •  sodium chloride        Assessment/Plan   Assessment/Plan     Active Hospital Problems    Diagnosis  POA   • **Ureteral calculus, left [N20.1]  Yes   • Acute left flank pain [R10.9]  Yes   • Sleep apnea [G47.30]  Yes   • Mild single current episode of major depressive disorder (CMS/HCC) [F32.0]  Yes   • Chronic midline low back pain with bilateral sciatica [M54.41, M54.42, G89.29]  Yes      Resolved Hospital Problems   No resolved problems to display.       MEDICAL DECISION MAKING COMPLEXITY BY PROBLEM:      #Nephrolithiasis    - left sided, 6x3mm at UVJ with mild left hydronephrosis    - nonobstructing    - Urology consulted, plans for surgery tomorrow if patient does not pass    - IVF and flomax    - Pain control    - UA reveals trace trace wbcs and trace leukocytes, refrain from abx at this time    - urine culture pending    - Toradol PRN    #L1 fracture    - Chronic    - Will Monitor    #Atelectasis    - Chronic, stable    - Patient on room air without diistress    #Low back pain    -Chronic, stable    - Hold home mobic    #DEVONTE    -CPAP QHS    #Depression    -Continue home Cymbalta    #DVT prophylaxis    -SCDs    VTE Prophylaxis -   Mechanical Order History:      Ordered        09/14/20 0957  Place Sequential Compression Device  Once         09/14/20 0957  Maintain Sequential Compression Device  Continuous                 Pharmalogical Order History:     None            Code Status -   Code Status  and Medical Interventions:   Ordered at: 09/14/20 0957     Level Of Support Discussed With:    Patient     Code Status:    CPR     Medical Interventions (Level of Support Prior to Arrest):    Full       This patient has been examined wearing appropriate Personal Protective Equipment and discussed with Patient. 09/14/20        Discharge Planning  Possible discharge home tomorrow after stone removed          Electronically signed by Domo Jaeger DO, 09/14/20, 18:32 EDT.  [facility]ist Team

## 2020-09-14 NOTE — PAT
Inpt added to surgery schedule for 9/15/2020 with Dr. Johnson.  Spoke with nurse Eliseo, ordered an EKG, urine cx is pending, BMP and CBC are wnl.

## 2020-09-14 NOTE — PLAN OF CARE
Goal Outcome Evaluation:         Patient still complains of left sided flank pain. Treated per MAR. Plans for cysto tomorrow with stone removal and stent placement if patient does not pass stone tonight. We are to continue straining urine.  Patient from home with plans to return home when discharged. No needs at this time. Will continue to monitor.

## 2020-09-14 NOTE — ED NOTES
Pt c/o L flank pain since last night, reports hx kidney stones     Lulu Shaffer, WILLIE  09/14/20 0619

## 2020-09-14 NOTE — CONSULTS
"Urology Consult Note    Patient:Jannet Fierro :1955  Room:220/1  Admit Date2020  Age:65 y.o.     SEX:female     DOS:2020     MR:[id number]     Visit:[id number]       Attending: Domo Jaeger DO  Referring Provider: Dr Jaeger  Reason for Consultation: L ureteral calculus    Patient Care Team:  Sandra Roe MD as PCP - General (Family Medicine)  Ammy José MD as Consulting Physician (Pulmonary Disease)    Chief complaint L flank pain    Subjective .     History of present illness:  66 yo woman with h/o stones though none recently. Pt reports severe L flank pain since early this AM. Radiates to R LQ. Improved with IV pain med. Associated nausea and dry heaves. No voiding complaits.    CT shows 6X3mm stone at L UVJ with mild hydro. Small L renal calculus and mod L renal cyst.    Review of Systems  10 point review of systems were reviewed and are negative except for:  Constitution:  positive for See HPI    History  Past Medical History:   Diagnosis Date   • Back pain    • Cervical radiculitis 2020   • Chronic kidney disease     stones   • Chronic midline low back pain without sciatica 2017   • Compression fracture of first lumbar vertebra (CMS/HCC)      history   • Hard to intubate     be careful pt states she is to tell she has a small mouth   • Mild single current episode of major depressive disorder (CMS/HCC) 2017   • Neck pain    • Sleep apnea     cpap     Past Surgical History:   Procedure Laterality Date   • ANKLE SURGERY Right     hardware   • ANTERIOR CERVICAL DISCECTOMY W/ FUSION N/A 3/23/2020    Procedure: Cervical 4 to cervical 5 and cervical 5 to cervical 6 anterior cervical discectomy, fusion and instrumentation;  Surgeon: Segundo Thomas MD;  Location: [facility] OR;  Service: Neurosurgery;  Laterality: N/A;   • KIDNEY STONE SURGERY     • KNEE SURGERY Right     hardware    torn acl & tendons and ligaments with pin and \"bungee cord\"     Social History    "     Socioeconomic History   • Marital status:      Spouse name: Not on file   • Number of children: Not on file   • Years of education: Not on file   • Highest education level: Not on file   Tobacco Use   • Smoking status: Never Smoker   • Smokeless tobacco: Never Used   Substance and Sexual Activity   • Alcohol use: Yes     Comment: less than one drink once per month   • Drug use: Never   • Sexual activity: Defer     Family History   Problem Relation Age of Onset   • Heart attack Mother 71   • Hypothyroidism Mother    • Hypertension Father    • Skin cancer Father 91   • Thyroid disease Daughter 31   • Malig Hyperthermia Neg Hx      Allergy  Allergies   Allergen Reactions   • Methocarbamol Itching     Prior to Admission medications    Medication Sig Start Date End Date Taking? Authorizing Provider   aspirin (Aspirin Low Dose) 81 MG EC tablet Take 1 tablet by mouth Daily. 20  Yes Sandra Roe MD   DULoxetine (CYMBALTA) 60 MG capsule TAKE 1 CAPSULE BY MOUTH DAILY 20  Yes Sandra Roe MD   cyclobenzaprine (FLEXERIL) 10 MG tablet Take 1 tablet by mouth 2 (Two) Times a Day As Needed for Muscle Spasms. 20   Betina Jaramillo APRN   meclizine (ANTIVERT) 25 MG tablet Take 1 tablet by mouth 3 (Three) Times a Day As Needed for Dizziness (vertigo symptoms). 20   Sandra Roe MD   meloxicam (MOBIC) 15 MG tablet Take 1 tablet by mouth Daily As Needed for Moderate Pain . Take with food and water 20   Sandra Roe MD         Objective     tMax 24 hours:  Temp (24hrs), Av.7 °F (36.5 °C), Min:97.5 °F (36.4 °C), Max:97.9 °F (36.6 °C)    Vital Sign Ranges:  Temp:  [97.5 °F (36.4 °C)-97.9 °F (36.6 °C)] 97.5 °F (36.4 °C)  Heart Rate:  [] 92  Resp:  [17-20] 17  BP: (124-170)/(57-98) 128/74  Intake and Output Last 3 Shifts:  No intake/output data recorded.      Physical Exam:   General Appearance: alert, appears stated age and cooperative  Head: normocephalic, without obvious abnormality and  atraumatic  Eyes: lids and lashes normal, conjunctivae and sclerae normal, no icterus, no pallor and corneas clear  Lungs: respirations regular, respirations even and respirations unlabored  Heart: regular rhythm & normal rate  Abdomen: no guarding, no rebound tenderness and L CVA tenderness  Extremities: moves extremities well, no edema, no cyanosis and no redness  Skin: no bleeding, bruising or rash  Neurologic: Mental Status orientated to person, place, time and situation    Results Review:     Lab Results (last 24 hours)     Procedure Component Value Units Date/Time    COVID PRE-OP / PRE-PROCEDURE SCREENING ORDER (NO ISOLATION) - Swab, Nasopharynx [[id number]] Collected: 09/14/20 1238    Specimen: Swab from Nasopharynx Updated: 09/14/20 1346    Narrative:      The following orders were created for panel order COVID PRE-OP / PRE-PROCEDURE SCREENING ORDER (NO ISOLATION) - Swab, Nasopharynx.  Procedure                               Abnormality         Status                     ---------                               -----------         ------                     Respiratory Panel PCR w/...[[id number]]                      In process                   Please view results for these tests on the individual orders.    Respiratory Panel PCR w/COVID-19(SARS-CoV-2) [facility] In-House, NP Swab in UTM/VTM, 3-4 HR TAT - Swab, Nasopharynx [[id number]] Collected: 09/14/20 1238    Specimen: Swab from Nasopharynx Updated: 09/14/20 1346    BUN [[id number]]  (Normal) Collected: 09/14/20 0614    Specimen: Blood Updated: 09/14/20 0739     BUN 17 mg/dL     Basic Metabolic Panel [[id number]]  (Abnormal) Collected: 09/14/20 0614    Specimen: Blood Updated: 09/14/20 0727     Glucose 125 mg/dL      BUN --     Comment: Testing performed by alternate method        Creatinine 0.89 mg/dL      Sodium 140 mmol/L      Potassium 4.2 mmol/L      Chloride 106 mmol/L      CO2 22.0 mmol/L      Calcium 9.2 mg/dL      eGFR Non African Amer  64 mL/min/1.73      BUN/Creatinine Ratio --     Comment: Testing not performed        Anion Gap 12.0 mmol/L     Narrative:      GFR Normal >60  Chronic Kidney Disease <60  Kidney Failure <15      [place] Draw [[id number]] Collected: 09/14/20 0614    Specimen: Blood Updated: 09/14/20 0715    Narrative:      The following orders were created for panel order [place] Draw.  Procedure                               Abnormality         Status                     ---------                               -----------         ------                     Light Blue Top[[id number]]                                   Final result               Green Top (Gel)[[id number]]                                  Final result               Lavender Top[[id number]]                                     Final result               Gold Top - SST[[id number]]                                   Final result                 Please view results for these tests on the individual orders.    Light Blue Top [[id number]] Collected: 09/14/20 0614    Specimen: Blood Updated: 09/14/20 0715     Extra Tube hold for add-on     Comment: Auto resulted       Gold Top - SST [[id number]] Collected: 09/14/20 0614    Specimen: Blood Updated: 09/14/20 0715     Extra Tube Hold for add-ons.     Comment: Auto resulted.       Green Top (Gel) [[id number]] Collected: 09/14/20 0614    Specimen: Blood Updated: 09/14/20 0715     Extra Tube Hold for add-ons.     Comment: Auto resulted.       Lavender Top [[id number]] Collected: 09/14/20 0614    Specimen: Blood Updated: 09/14/20 0715     Extra Tube hold for add-on     Comment: Auto resulted       Urinalysis With Culture If Indicated - Urine, Clean Catch [[id number]]  (Abnormal) Collected: 09/14/20 0632    Specimen: Urine, Clean Catch Updated: 09/14/20 0643     Color, UA Yellow     Appearance, UA Clear     pH, UA 5.5     Specific Gravity, UA 1.021     Glucose, UA Negative     Ketones, UA Negative     Bilirubin, UA Negative     Blood, UA  Moderate (2+)     Protein, UA Negative     Leuk Esterase, UA Small (1+)     Nitrite, UA Negative     Urobilinogen, UA 0.2 E.U./dL    Urinalysis, Microscopic Only - Urine, Clean Catch [[id number]]  (Abnormal) Collected: 09/14/20 0632    Specimen: Urine, Clean Catch Updated: 09/14/20 0643     RBC, UA Too Numerous to Count /HPF      WBC, UA 6-12 /HPF      Bacteria, UA None Seen /HPF      Squamous Epithelial Cells, UA 0-2 /HPF      Hyaline Casts, UA None Seen /LPF      Methodology Automated Microscopy    Urine Culture - Urine, Urine, Clean Catch [[id number]] Collected: 09/14/20 0632    Specimen: Urine, Clean Catch Updated: 09/14/20 0643    CBC & Differential [[id number]]  (Abnormal) Collected: 09/14/20 0614    Specimen: Blood Updated: 09/14/20 0637    Narrative:      The following orders were created for panel order CBC & Differential.  Procedure                               Abnormality         Status                     ---------                               -----------         ------                     CBC Auto Differential[[id number]]        Abnormal            Final result                 Please view results for these tests on the individual orders.    CBC Auto Differential [[id number]]  (Abnormal) Collected: 09/14/20 0614    Specimen: Blood Updated: 09/14/20 0637     WBC 10.80 10*3/mm3      RBC 5.35 10*6/mm3      Hemoglobin 15.4 g/dL      Hematocrit 47.7 %      MCV 89.1 fL      MCH 28.9 pg      MCHC 32.4 g/dL      RDW 14.8 %      RDW-SD 46.4 fl      MPV 7.6 fL      Platelets 213 10*3/mm3      Neutrophil % 83.0 %      Lymphocyte % 8.3 %      Monocyte % 7.1 %      Eosinophil % 1.0 %      Basophil % 0.6 %      Neutrophils, Absolute 9.00 10*3/mm3      Lymphocytes, Absolute 0.90 10*3/mm3      Monocytes, Absolute 0.80 10*3/mm3      Eosinophils, Absolute 0.10 10*3/mm3      Basophils, Absolute 0.10 10*3/mm3      nRBC 0.0 /100 WBC          No results found for: URINECX     Imaging Results (Last 7 Days)     Procedure Component  Value Units Date/Time    CT Abdomen Pelvis With Contrast [[id number]] Collected: 09/14/20 0822     Updated: 09/14/20 0833    Narrative:      CT ABDOMEN PELVIS W CONTRAST-     Date of Exam: 9/14/2020 7:43 AM     Indication: left flank pain.  . HISTORY of kidney stones. Prior  lithotripsy.     Comparison: None available.     Technique: Contiguous axial CT images were obtained from the lung bases  to the pubic symphysis following uneventful administration of 100 cc  Isovue-370. intravenous and oral contrast. Sagittal and coronal  reconstructions were performed.  Automated exposure control and  iterative reconstruction methods were used.     FINDINGS:     6 x 3 mm obstructing stone is located at the left ureterovesical  junction resulting in mild left hydronephrosis. There is slightly  diminished enhancement of the left kidney compared to the right. A 2 to  3 mm stone is seen within the left mid kidney. No right renal stone is  identified. Urinary bladder is normal.     Small calcifications in the pelvis bilaterally are favored to represent  phleboliths.     A small layering gallstones are present. The liver, spleen, pancreas,  adrenals are normal. A cyst at the left upper renal pole measures 1.8  cm. The appendix is not visualized. No pericecal inflammation is seen.  There is a moderate stool burden in the cecum and ascending colon. No  evidence of active bowel inflammation or bowel obstruction. Small second  duodenal segment diverticulum is noted.     The uterus and rectum are within normal limits. No adenopathy or free  fluid is identified.     Bandlike opacities are present in the bilateral lower lobes, favored to  represent atelectasis. Heart size is normal. No basilar pleural  effusion. Tiny esophageal hiatal hernia.     Chronic appearing L1 compression fracture with 60% loss of vertebral  body height. There is approximately 3 mm bony retropulsion, but no  subluxation is seen. No acute osseous abnormalities are  identified.          Impression:         1. 6 x 3 mm obstructing left ureterovesical junction stone with mild  left hydronephrosis.  2. Nonobstructing left renal stone.  3. Left renal cyst.  4. Uncomplicated cholelithiasis.  5. Bibasilar bandlike atelectatic changes.  6. Chronic L1 compression fracture.           Electronically Signed By-Dr. Dominique Prasad MD On:9/14/2020 8:27 AM  This report was finalized on [id number] by Dr. Dominique Prasad MD.          Inpatient Meds:   Scheduled Meds:aspirin, 81 mg, Oral, Daily  DULoxetine, 60 mg, Oral, Daily  sodium chloride, 10 mL, Intravenous, Q12H  tamsulosin, 0.4 mg, Oral, BID       Continuous Infusions:dextrose 5 % and sodium chloride 0.45 %, 125 mL/hr       PRN Meds:.•  acetaminophen **OR** acetaminophen **OR** acetaminophen  •  aluminum-magnesium hydroxide-simethicone  •  bisacodyl  •  bisacodyl  •  cyclobenzaprine  •  HYDROcodone-acetaminophen  •  influenza vaccine  •  ketorolac  •  magnesium hydroxide  •  meclizine  •  melatonin  •  ondansetron **OR** ondansetron  •  sodium chloride  •  sodium chloride      Assessment/Plan     Principal Problem:    Ureteral calculus, left  Active Problems:    Chronic midline low back pain with bilateral sciatica    Mild single current episode of major depressive disorder (CMS/HCC)    Sleep apnea    Acute left flank pain    L ureteral calculus with obstruction  L renal calculus  L renal cyst    Plan  IV hydration  Flomax  Plan cysto, L uscope stone ext, laser litho, stent tomorrow AM if stone doew not pass - R/B/A d/w pt      I discussed the patients findings and my recommendations with patient and nursing staff    Thank you for this  consult    Hill Johnson MD  09/14/20  13:49 EDT

## 2020-09-14 NOTE — ED PROVIDER NOTES
"Subjective   65-year-old female complains of severe left flank pain rating to left anterior abdomen associated with nausea consistent with prior episodes of kidney stones no clear aggravating limiting factors, onset this morning at 12:30 AM.          Review of Systems   Gastrointestinal: Positive for abdominal pain and nausea.   Genitourinary: Positive for flank pain.   All other systems reviewed and are negative.      Past Medical History:   Diagnosis Date   • Back pain    • Cervical radiculitis 2/11/2020   • Chronic kidney disease     stones   • Chronic midline low back pain without sciatica 11/29/2017   • Compression fracture of first lumbar vertebra (CMS/HCC)     2015 history   • Hard to intubate     be careful pt states she is to tell she has a small mouth   • Mild single current episode of major depressive disorder (CMS/HCC) 11/29/2017   • Neck pain    • Sleep apnea     cpap       Allergies   Allergen Reactions   • Methocarbamol Itching       Past Surgical History:   Procedure Laterality Date   • ANKLE SURGERY Right     hardware   • ANTERIOR CERVICAL DISCECTOMY W/ FUSION N/A 3/23/2020    Procedure: Cervical 4 to cervical 5 and cervical 5 to cervical 6 anterior cervical discectomy, fusion and instrumentation;  Surgeon: Segundo Thomas MD;  Location: [facility];  Service: Neurosurgery;  Laterality: N/A;   • KIDNEY STONE SURGERY     • KNEE SURGERY Right     hardware    torn acl & tendons and ligaments with pin and \"bungee cord\"       Family History   Problem Relation Age of Onset   • Heart attack Mother 71   • Hypothyroidism Mother    • Hypertension Father    • Skin cancer Father 91   • Thyroid disease Daughter 31   • Malig Hyperthermia Neg Hx        Social History     Socioeconomic History   • Marital status:      Spouse name: Not on file   • Number of children: Not on file   • Years of education: Not on file   • Highest education level: Not on file   Tobacco Use   • Smoking status: Never Smoker "   • Smokeless tobacco: Never Used   Substance and Sexual Activity   • Alcohol use: Yes     Comment: less than one drink once per month   • Drug use: Never   • Sexual activity: Defer           Objective   Physical Exam  Constitutional:       Appearance: Normal appearance.   HENT:      Head: Normocephalic and atraumatic.      Mouth/Throat:      Mouth: Mucous membranes are moist.      Pharynx: Oropharynx is clear.   Eyes:      Conjunctiva/sclera: Conjunctivae normal.      Pupils: Pupils are equal, round, and reactive to light.   Neck:      Musculoskeletal: Normal range of motion and neck supple.   Cardiovascular:      Rate and Rhythm: Normal rate and regular rhythm.      Pulses: Normal pulses.      Heart sounds: Normal heart sounds.   Pulmonary:      Effort: Pulmonary effort is normal.      Breath sounds: Normal breath sounds.   Abdominal:      General: Bowel sounds are normal. There is no distension.      Palpations: Abdomen is soft.      Comments: Mild left-sided abdominal tenderness to palpation, no rebound or guarding   Musculoskeletal: Normal range of motion.   Skin:     General: Skin is warm and dry.      Capillary Refill: Capillary refill takes less than 2 seconds.   Neurological:      General: No focal deficit present.      Mental Status: She is alert and oriented to person, place, and time.   Psychiatric:         Mood and Affect: Mood normal.         Thought Content: Thought content normal.         Procedures           ED Course  ED Course as of Sep 14 0909   Mon Sep 14, 2020   0800 Patient care assumed from Dr. Vazquez at 0800, pending CT scan.    [MM]   0843 Patient reevaluated, states that she is having worsening left flank pain, states that her pain medicine is wearing off.  Discussed imaging results with patient at bedside, will consult urology    [MM]   0855 Spoke with Dr. Johnson, urology who requested patient to be placed on n.p.o. status that he would consult during admission    [MM]   0909 Spoke with  "MELITON Santana who agreed accept patient for hospitalist, Dr. Jaeger    [MM]      ED Course User Index  [MM] Yue Mcconnell PA    /64   Pulse 102   Temp 97.9 °F (36.6 °C) (Oral)   Resp 20   Ht 162.6 cm (64\")   Wt 116 kg (255 lb 4.7 oz)   LMP  (LMP Unknown)   SpO2 94%   BMI 43.82 kg/m²   Labs Reviewed   BASIC METABOLIC PANEL - Abnormal; Notable for the following components:       Result Value    Glucose 125 (*)     All other components within normal limits    Narrative:     GFR Normal >60  Chronic Kidney Disease <60  Kidney Failure <15     URINALYSIS W/ CULTURE IF INDICATED - Abnormal; Notable for the following components:    Blood, UA Moderate (2+) (*)     Leuk Esterase, UA Small (1+) (*)     All other components within normal limits   CBC WITH AUTO DIFFERENTIAL - Abnormal; Notable for the following components:    RBC 5.35 (*)     Hematocrit 47.7 (*)     Neutrophil % 83.0 (*)     Lymphocyte % 8.3 (*)     Neutrophils, Absolute 9.00 (*)     All other components within normal limits   URINALYSIS, MICROSCOPIC ONLY - Abnormal; Notable for the following components:    RBC, UA Too Numerous to Count (*)     WBC, UA 6-12 (*)     All other components within normal limits   BUN - Normal   URINE CULTURE   RAINBOW DRAW    Narrative:     The following orders were created for panel order [place] Draw.  Procedure                               Abnormality         Status                     ---------                               -----------         ------                     Light Blue Top[[id number]]                                   Final result               Green Top (Gel)[[id number]]                                  Final result               Lavender Top[[id number]]                                     Final result               Gold Top - SST[[id number]]                                   Final result                 Please view results for these tests on the individual orders.   LIGHT BLUE TOP   GREEN TOP   LAVENDER " [facility] [place]   CBC AND DIFFERENTIAL    Narrative:     The following orders were created for panel order CBC & Differential.  Procedure                               Abnormality         Status                     ---------                               -----------         ------                     CBC Auto Differential[[id number]]        Abnormal            Final result                 Please view results for these tests on the individual orders.     Medications   sodium chloride 0.9 % flush 10 mL (has no administration in time range)   lidocaine (XYLOCAINE) 1 % 150 mg in sodium chloride 0.9 % 100 mL IVPB (has no administration in time range)   ketorolac (TORADOL) injection 15 mg (has no administration in time range)   sodium chloride 0.9 % bolus 1,000 mL (0 mL Intravenous Stopped 9/14/20 0841)   Morphine sulfate (PF) injection 4 mg (4 mg Intravenous Given 9/14/20 0631)   ondansetron (ZOFRAN) injection 4 mg (4 mg Intravenous Given 9/14/20 0631)   HYDROmorphone (DILAUDID) injection 1 mg (1 mg Intravenous Given 9/14/20 0718)   iopamidol (ISOVUE-370) 76 % injection 100 mL (100 mL Intravenous Given 9/14/20 0813)     Ct Abdomen Pelvis With Contrast    Result Date: 9/14/2020   1. 6 x 3 mm obstructing left ureterovesical junction stone with mild left hydronephrosis. 2. Nonobstructing left renal stone. 3. Left renal cyst. 4. Uncomplicated cholelithiasis. 5. Bibasilar bandlike atelectatic changes. 6. Chronic L1 compression fracture.    Electronically Signed By-Dr. Dominique Prasad MD On:9/14/2020 8:27 AM This report was finalized on [id number] by Dr. Dominique Prasad MD.                                           MDM  Number of Diagnoses or Management Options  Left flank pain:   Nausea:   Ureteral calculus, left:   Diagnosis management comments: MEDICAL DECISION  Epic Chart Review:  Comorbidities: History of kidney stones  Differentials: Nephrolithiasis, UTI; this list is not all inclusive and does not constitute the  entirety of considered causes  Radiology interpretation:  Images reviewed by me and interpreted by radiologist,   CT abdomen pelvis shows 6 x 3 mm obstructing left UVJ stone with mild left hydronephrosis.  Nonobstructing left renal stone.  Uncomplicated cholelithiasis  Lab interpretation:  Labs viewed by me significant for, urinalysis 2+ blood, 1+ leukocytes, TNTC RBCs.  Urine culture pending.  BMP glucose 125.  CBC no leukocytosis, RBC slightly elevated 5.35.    Patient care initiated by Dr. Vazquez including lab work, imaging studies and physical exam.  Patient care assumed by myself, FLORIDALMA Houston at 0800 pending CT and disposition.  Appropriate PPE was worn during exam and throughout all encounters with the patient.  CT scan significant for 6 x 3 mm obstructing left UVJ stone with mild left hydronephrosis.  Patient was reevaluated, states that her pain has returned to 8/10, requesting more Dilaudid.  Will try Toradol and lidocaine infusion for pain.  Consult with urology, spoke with Dr. Johnson who recommended n.p.o., agreed with patient admission for pain control if unrelieved in the ER and he would consult during admission.  Patient is agreeable to plan of admission.  Consult placed to hospitalist, spoke with MELITON Santana who agreed accept patient for Dr. Jaeger.  Patient stable on admission.       Amount and/or Complexity of Data Reviewed  Clinical lab tests: reviewed and ordered  Tests in the radiology section of CPT®: ordered and reviewed    Patient Progress  Patient progress: stable      Final diagnoses:   Ureteral calculus, left   Nausea   Left flank pain            Yue Mcconnell PA  09/14/20 8885

## 2020-09-15 ENCOUNTER — ANESTHESIA (OUTPATIENT)
Dept: PERIOP | Facility: HOSPITAL | Age: 65
End: 2020-09-15

## 2020-09-15 ENCOUNTER — EPISODE CHANGES (OUTPATIENT)
Dept: CASE MANAGEMENT | Facility: OTHER | Age: 65
End: 2020-09-15

## 2020-09-15 ENCOUNTER — APPOINTMENT (OUTPATIENT)
Dept: GENERAL RADIOLOGY | Facility: HOSPITAL | Age: 65
End: 2020-09-15

## 2020-09-15 ENCOUNTER — READMISSION MANAGEMENT (OUTPATIENT)
Dept: CALL CENTER | Facility: HOSPITAL | Age: 65
End: 2020-09-15

## 2020-09-15 VITALS
RESPIRATION RATE: 18 BRPM | OXYGEN SATURATION: 91 % | HEIGHT: 64 IN | DIASTOLIC BLOOD PRESSURE: 62 MMHG | HEART RATE: 110 BPM | SYSTOLIC BLOOD PRESSURE: 140 MMHG | BODY MASS INDEX: 45.31 KG/M2 | WEIGHT: 265.4 LBS | TEMPERATURE: 97.4 F

## 2020-09-15 PROBLEM — N20.1 URETERAL CALCULUS, LEFT: Status: RESOLVED | Noted: 2020-09-14 | Resolved: 2020-09-15

## 2020-09-15 LAB
ANION GAP SERPL CALCULATED.3IONS-SCNC: 8 MMOL/L (ref 5–15)
BACTERIA SPEC AEROBE CULT: NO GROWTH
BASOPHILS # BLD AUTO: 0 10*3/MM3 (ref 0–0.2)
BASOPHILS NFR BLD AUTO: 0.7 % (ref 0–1.5)
BUN SERPL-MCNC: 11 MG/DL (ref 8–23)
BUN SERPL-MCNC: ABNORMAL MG/DL
BUN/CREAT SERPL: ABNORMAL
CALCIUM SPEC-SCNC: 8.3 MG/DL (ref 8.6–10.5)
CHLORIDE SERPL-SCNC: 105 MMOL/L (ref 98–107)
CO2 SERPL-SCNC: 23 MMOL/L (ref 22–29)
CREAT SERPL-MCNC: 1.14 MG/DL (ref 0.57–1)
DEPRECATED RDW RBC AUTO: 47.3 FL (ref 37–54)
EOSINOPHIL # BLD AUTO: 0.2 10*3/MM3 (ref 0–0.4)
EOSINOPHIL NFR BLD AUTO: 3.7 % (ref 0.3–6.2)
ERYTHROCYTE [DISTWIDTH] IN BLOOD BY AUTOMATED COUNT: 15 % (ref 12.3–15.4)
GFR SERPL CREATININE-BSD FRML MDRD: 48 ML/MIN/1.73
GLUCOSE SERPL-MCNC: 129 MG/DL (ref 65–99)
HCT VFR BLD AUTO: 39.4 % (ref 34–46.6)
HGB BLD-MCNC: 12.8 G/DL (ref 12–15.9)
LYMPHOCYTES # BLD AUTO: 0.9 10*3/MM3 (ref 0.7–3.1)
LYMPHOCYTES NFR BLD AUTO: 14.7 % (ref 19.6–45.3)
MCH RBC QN AUTO: 29 PG (ref 26.6–33)
MCHC RBC AUTO-ENTMCNC: 32.4 G/DL (ref 31.5–35.7)
MCV RBC AUTO: 89.5 FL (ref 79–97)
MONOCYTES # BLD AUTO: 0.6 10*3/MM3 (ref 0.1–0.9)
MONOCYTES NFR BLD AUTO: 9.3 % (ref 5–12)
NEUTROPHILS NFR BLD AUTO: 4.5 10*3/MM3 (ref 1.7–7)
NEUTROPHILS NFR BLD AUTO: 71.6 % (ref 42.7–76)
NRBC BLD AUTO-RTO: 0.1 /100 WBC (ref 0–0.2)
PLATELET # BLD AUTO: 150 10*3/MM3 (ref 140–450)
PMV BLD AUTO: 7.7 FL (ref 6–12)
POTASSIUM SERPL-SCNC: 3.7 MMOL/L (ref 3.5–5.2)
RBC # BLD AUTO: 4.41 10*6/MM3 (ref 3.77–5.28)
SODIUM SERPL-SCNC: 136 MMOL/L (ref 136–145)
WBC # BLD AUTO: 6.3 10*3/MM3 (ref 3.4–10.8)

## 2020-09-15 PROCEDURE — 80048 BASIC METABOLIC PNL TOTAL CA: CPT | Performed by: NURSE PRACTITIONER

## 2020-09-15 PROCEDURE — 25010000002 METOCLOPRAMIDE PER 10 MG: Performed by: ANESTHESIOLOGY

## 2020-09-15 PROCEDURE — 90686 IIV4 VACC NO PRSV 0.5 ML IM: CPT | Performed by: STUDENT IN AN ORGANIZED HEALTH CARE EDUCATION/TRAINING PROGRAM

## 2020-09-15 PROCEDURE — G0008 ADMIN INFLUENZA VIRUS VAC: HCPCS | Performed by: STUDENT IN AN ORGANIZED HEALTH CARE EDUCATION/TRAINING PROGRAM

## 2020-09-15 PROCEDURE — G0378 HOSPITAL OBSERVATION PER HR: HCPCS

## 2020-09-15 PROCEDURE — 93010 ELECTROCARDIOGRAM REPORT: CPT | Performed by: INTERNAL MEDICINE

## 2020-09-15 PROCEDURE — 25010000002 FENTANYL CITRATE (PF) 100 MCG/2ML SOLUTION: Performed by: ANESTHESIOLOGY

## 2020-09-15 PROCEDURE — 76000 FLUOROSCOPY <1 HR PHYS/QHP: CPT

## 2020-09-15 PROCEDURE — 25010000002 DEXAMETHASONE PER 1 MG: Performed by: ANESTHESIOLOGY

## 2020-09-15 PROCEDURE — 25010000002 ONDANSETRON PER 1 MG: Performed by: NURSE PRACTITIONER

## 2020-09-15 PROCEDURE — 85025 COMPLETE CBC W/AUTO DIFF WBC: CPT | Performed by: NURSE PRACTITIONER

## 2020-09-15 PROCEDURE — C1769 GUIDE WIRE: HCPCS | Performed by: UROLOGY

## 2020-09-15 PROCEDURE — C2617 STENT, NON-COR, TEM W/O DEL: HCPCS | Performed by: UROLOGY

## 2020-09-15 PROCEDURE — 99217 PR OBSERVATION CARE DISCHARGE MANAGEMENT: CPT | Performed by: STUDENT IN AN ORGANIZED HEALTH CARE EDUCATION/TRAINING PROGRAM

## 2020-09-15 PROCEDURE — 25010000002 KETOROLAC TROMETHAMINE PER 15 MG: Performed by: ANESTHESIOLOGY

## 2020-09-15 PROCEDURE — 25010000002 INFLUENZA VAC SPLIT QUAD 0.5 ML SUSPENSION PREFILLED SYRINGE: Performed by: STUDENT IN AN ORGANIZED HEALTH CARE EDUCATION/TRAINING PROGRAM

## 2020-09-15 PROCEDURE — 25010000002 MIDAZOLAM PER 1 MG: Performed by: ANESTHESIOLOGY

## 2020-09-15 DEVICE — URETERAL STENT
Type: IMPLANTABLE DEVICE | Site: URETER | Status: FUNCTIONAL
Brand: PERCUFLEX™ PLUS

## 2020-09-15 RX ORDER — ACETAMINOPHEN 325 MG/1
650 TABLET ORAL ONCE AS NEEDED
Status: DISCONTINUED | OUTPATIENT
Start: 2020-09-15 | End: 2020-09-15 | Stop reason: HOSPADM

## 2020-09-15 RX ORDER — METOCLOPRAMIDE HYDROCHLORIDE 5 MG/ML
INJECTION INTRAMUSCULAR; INTRAVENOUS AS NEEDED
Status: DISCONTINUED | OUTPATIENT
Start: 2020-09-15 | End: 2020-09-15 | Stop reason: SURG

## 2020-09-15 RX ORDER — DEXAMETHASONE SODIUM PHOSPHATE 4 MG/ML
INJECTION, SOLUTION INTRA-ARTICULAR; INTRALESIONAL; INTRAMUSCULAR; INTRAVENOUS; SOFT TISSUE AS NEEDED
Status: DISCONTINUED | OUTPATIENT
Start: 2020-09-15 | End: 2020-09-15 | Stop reason: SURG

## 2020-09-15 RX ORDER — SODIUM CHLORIDE, SODIUM LACTATE, POTASSIUM CHLORIDE, CALCIUM CHLORIDE 600; 310; 30; 20 MG/100ML; MG/100ML; MG/100ML; MG/100ML
9 INJECTION, SOLUTION INTRAVENOUS CONTINUOUS PRN
Status: DISCONTINUED | OUTPATIENT
Start: 2020-09-15 | End: 2020-09-15 | Stop reason: HOSPADM

## 2020-09-15 RX ORDER — KETOROLAC TROMETHAMINE 30 MG/ML
INJECTION, SOLUTION INTRAMUSCULAR; INTRAVENOUS AS NEEDED
Status: DISCONTINUED | OUTPATIENT
Start: 2020-09-15 | End: 2020-09-15 | Stop reason: SURG

## 2020-09-15 RX ORDER — CEPHALEXIN 500 MG/1
500 CAPSULE ORAL 3 TIMES DAILY
Qty: 30 CAPSULE | Refills: 0 | Status: SHIPPED | OUTPATIENT
Start: 2020-09-15 | End: 2020-09-25

## 2020-09-15 RX ORDER — FENTANYL CITRATE 50 UG/ML
50 INJECTION, SOLUTION INTRAMUSCULAR; INTRAVENOUS
Status: DISCONTINUED | OUTPATIENT
Start: 2020-09-15 | End: 2020-09-15 | Stop reason: HOSPADM

## 2020-09-15 RX ORDER — SODIUM CHLORIDE 0.9 % (FLUSH) 0.9 %
10 SYRINGE (ML) INJECTION AS NEEDED
Status: DISCONTINUED | OUTPATIENT
Start: 2020-09-15 | End: 2020-09-15 | Stop reason: HOSPADM

## 2020-09-15 RX ORDER — LIDOCAINE HYDROCHLORIDE 20 MG/ML
INJECTION, SOLUTION EPIDURAL; INFILTRATION; INTRACAUDAL; PERINEURAL AS NEEDED
Status: DISCONTINUED | OUTPATIENT
Start: 2020-09-15 | End: 2020-09-15 | Stop reason: SURG

## 2020-09-15 RX ORDER — ACETAMINOPHEN 650 MG/1
650 SUPPOSITORY RECTAL ONCE AS NEEDED
Status: DISCONTINUED | OUTPATIENT
Start: 2020-09-15 | End: 2020-09-15 | Stop reason: HOSPADM

## 2020-09-15 RX ORDER — HYDROMORPHONE HCL 110MG/55ML
0.5 PATIENT CONTROLLED ANALGESIA SYRINGE INTRAVENOUS
Status: DISCONTINUED | OUTPATIENT
Start: 2020-09-15 | End: 2020-09-15 | Stop reason: HOSPADM

## 2020-09-15 RX ORDER — FENTANYL CITRATE 50 UG/ML
INJECTION, SOLUTION INTRAMUSCULAR; INTRAVENOUS AS NEEDED
Status: DISCONTINUED | OUTPATIENT
Start: 2020-09-15 | End: 2020-09-15 | Stop reason: SURG

## 2020-09-15 RX ORDER — HYDROCODONE BITARTRATE AND ACETAMINOPHEN 7.5; 325 MG/1; MG/1
1 TABLET ORAL EVERY 6 HOURS PRN
Qty: 20 TABLET | Refills: 0 | Status: SHIPPED | OUTPATIENT
Start: 2020-09-15 | End: 2021-08-16 | Stop reason: HOSPADM

## 2020-09-15 RX ORDER — MIDAZOLAM HYDROCHLORIDE 1 MG/ML
INJECTION INTRAMUSCULAR; INTRAVENOUS AS NEEDED
Status: DISCONTINUED | OUTPATIENT
Start: 2020-09-15 | End: 2020-09-15 | Stop reason: SURG

## 2020-09-15 RX ADMIN — DEXAMETHASONE SODIUM PHOSPHATE 10 MG: 4 INJECTION, SOLUTION INTRAMUSCULAR; INTRAVENOUS at 11:44

## 2020-09-15 RX ADMIN — ONDANSETRON 4 MG: 2 INJECTION INTRAMUSCULAR; INTRAVENOUS at 11:48

## 2020-09-15 RX ADMIN — INFLUENZA VIRUS VACCINE 0.5 ML: 15; 15; 15; 15 SUSPENSION INTRAMUSCULAR at 14:22

## 2020-09-15 RX ADMIN — HYDROCODONE BITARTRATE AND ACETAMINOPHEN 1 TABLET: 7.5; 325 TABLET ORAL at 00:26

## 2020-09-15 RX ADMIN — FENTANYL CITRATE 50 MCG: 50 INJECTION, SOLUTION INTRAMUSCULAR; INTRAVENOUS at 11:29

## 2020-09-15 RX ADMIN — CEFAZOLIN SODIUM 2 G: 1 INJECTION, POWDER, FOR SOLUTION INTRAMUSCULAR; INTRAVENOUS at 11:35

## 2020-09-15 RX ADMIN — METOCLOPRAMIDE 10 MG: 5 INJECTION, SOLUTION INTRAMUSCULAR; INTRAVENOUS at 11:27

## 2020-09-15 RX ADMIN — SODIUM CHLORIDE, SODIUM LACTATE, POTASSIUM CHLORIDE, AND CALCIUM CHLORIDE: .6; .31; .03; .02 INJECTION, SOLUTION INTRAVENOUS at 11:57

## 2020-09-15 RX ADMIN — MIDAZOLAM 2 MG: 1 INJECTION INTRAMUSCULAR; INTRAVENOUS at 11:28

## 2020-09-15 RX ADMIN — KETOROLAC TROMETHAMINE 30 MG: 30 INJECTION, SOLUTION INTRAMUSCULAR at 11:49

## 2020-09-15 RX ADMIN — DEXTROSE AND SODIUM CHLORIDE 125 ML/HR: 5; 450 INJECTION, SOLUTION INTRAVENOUS at 00:26

## 2020-09-15 RX ADMIN — GLYCOPYRROLATE 0.2 MG: 0.2 INJECTION, SOLUTION INTRAMUSCULAR; INTRAVITREAL at 11:27

## 2020-09-15 RX ADMIN — LIDOCAINE HYDROCHLORIDE 100 MG: 20 INJECTION, SOLUTION EPIDURAL; INFILTRATION; INTRACAUDAL; PERINEURAL at 11:33

## 2020-09-15 RX ADMIN — FENTANYL CITRATE 50 MCG: 50 INJECTION, SOLUTION INTRAMUSCULAR; INTRAVENOUS at 11:32

## 2020-09-15 NOTE — PROGRESS NOTES
Discharge Planning Assessment   Robert     Patient Name: Jannet Fierro  MRN: [id number]  Today's Date: 9/15/2020    Admit Date: 9/14/2020    Discharge Needs Assessment     Row Name 09/15/20 1518       Living Environment    Lives With  alone    Unique Family Situation  Pt states she is independent.    Current Living Arrangements  home/apartment/condo    Primary Care Provided by  self    Provides Primary Care For  no one    Family Caregiver if Needed  none    Able to Return to Prior Arrangements  yes    Living Arrangement Comments  Daughter can transport home.       Resource/Environmental Concerns    Resource/Environmental Concerns  none    Transportation Concerns  car, none       Transition Planning    Patient/Family Anticipates Transition to  home    Patient/Family Anticipated Services at Transition  none    Transportation Anticipated  family or friend will provide       Discharge Needs Assessment    Readmission Within the Last 30 Days  no previous admission in last 30 days    Equipment Currently Used at Home  -- States she does not use any equipment at home.    Concerns to be Addressed  no discharge needs identified    Anticipated Changes Related to Illness  none    Equipment Needed After Discharge  none    Discharge Coordination/Progress  DC Plan: Return home - no needs.        Discharge Plan     Row Name 09/15/20 1521       Plan    Plan  DC Plan: Return home - no needs.        Continued Care and Services - Discharged on 9/15/2020 Admission date: 9/14/2020 - Discharge disposition: Home or Self Care   Coordination has not been started for this encounter.       Expected Discharge Date and Time     Expected Discharge Date Expected Discharge Time    Sep 15, 2020         Demographic Summary     Row Name 09/15/20 1517       General Information    Admission Type  observation    Arrived From  emergency department    Referral Source  admission list    Reason for Consult  discharge planning    Preferred Language  English      Used During This Interaction  cate Kim, RN

## 2020-09-15 NOTE — PLAN OF CARE
Goal Outcome Evaluation:  Plan of Care Reviewed With: patient  Progress: no change   Pt c/o left sided flank pain. Pt has been straining urine. Consent signed for possible cystoscopy. Pt is NPO. Will continue to monitor...

## 2020-09-15 NOTE — ANESTHESIA PREPROCEDURE EVALUATION
Anesthesia Evaluation     Patient summary reviewed and Nursing notes reviewed   history of anesthetic complications: difficult airway  NPO Solid Status: > 8 hours  NPO Liquid Status: < 2 hours           Airway   Mallampati: III  TM distance: >3 FB  Neck ROM: full  Possible difficult intubation and Small opening  Dental - normal exam     Pulmonary - normal exam   (+) sleep apnea,   Cardiovascular     ECG reviewed  PT is on anticoagulation therapy  Rhythm: regular  Rate: abnormal      ROS comment: 5/25/16Stess ECHO no ischemia,  · Left ventricular wall thickness is consistent with borderline concentric hypertrophy.  · All left ventricular wall segments contract normally.  · Left ventricular function is normal. Estimated EF = 65%.  · Right ventricular cavity is mildly dilated.  · Mildly RV systolic function noted.    9/14/20 ECG , no changes    Neuro/Psych  (+) numbness, psychiatric history Depression,     GI/Hepatic/Renal/Endo    (+) morbid obesity,  renal disease stones,     Musculoskeletal     (+) back pain, chronic pain, neck pain,   Abdominal   (+) obese,    Substance History      OB/GYN          Other      history of cancer                  Anesthesia Plan    ASA 3     general     intravenous induction     Anesthetic plan, all risks, benefits, and alternatives have been provided, discussed and informed consent has been obtained with: patient.

## 2020-09-15 NOTE — OP NOTE
CYSTOSCOPY URETEROSCOPY RETROGRADE PYELOGRAM HOLMIUM LASER STENT INSERTION  Procedure Report    Patient Name:  Jannet Fierro  YOB: 1955    Date of Surgery:  9/15/2020     Indications: 65-year-old woman with left flank pain.  She was found to have a 6 x 3 mm distal left ureteral calculus.  She now presents for treatment of this.    Pre-op Diagnosis:   Ureteral calculus, left [N20.1]       Post-Op Diagnosis Codes:     * Ureteral calculus, left [N20.1]    Procedure/CPT® Codes:      Procedure(s):  CYSTOSCOPY, left URETEROSCOPY, left STENT INSERTION    Staff:  Surgeon(s):  Hill Johnson MD            was responsible for performing the following activities: None and their skilled assistance was necessary for the success of this case.    Anesthesia: General    Estimated Blood Loss: none    Implants:    Nothing was implanted during the procedure    Specimen:          None      Findings: No stone found in ureter    Complications: None    Description of Procedure: Patient induced with general anesthesia.  Placed in dorsolithotomy position.  Prepped and draped in sterile fashion.  22 Jordanian cystoscope introduced under direct vision.  Urethra is within normal limits.  Bladder was normal throughout.  No tumor stone or foreign body.  The left ureteral orifice did look somewhat erythematous.  Guidewire was passed up the left ureter under fluoroscopy.  Dual-lumen catheter was used to dilate the distal ureter.  Rigid ureteroscope was then passed next to the wire.  This was passed all the way up to the ureteropelvic junction and no stone was seen.  It was felt that the stone had passed at some point even though it been straining her urine.  The indwelling wire was backloaded through the cystoscope.  A 6 Jordanian by 26 cm ureteral stent was then passed over the wire and up into the left kidney under fluoroscopy.  The wire was removed.  A good curl was seen in the left kidney under fluoroscopy.  A good curl was in  the bladder endoscopically.  Patient's bladder was emptied.  Cystoscope was removed.  Patient was taken out of the dorsolithotomy position and awakened from general anesthesia.  She was transported to the postanesthesia care unit in stable condition having tolerated the procedure well without any complications.    Patient will follow-up with Dr. Hill Johnson in 1 week for cystoscopy and stent removal as part of a planned staged procedure.      Hill Johnson MD     Date: 9/15/2020  Time: 11:51 EDT

## 2020-09-15 NOTE — PROGRESS NOTES
"Urology Progress Note    Patient Identification:  Name:  Jannet Fierro  Age:  65 y.o.  Sex:  female  :  1955  MRN:  [id number]    Chief Complaint: Still some left flank pain    History of Present Illness: Has not passed her left ureteral calculus    Problem List:  Patient Active Problem List   Diagnosis   • Chronic midline low back pain with bilateral sciatica   • Mild single current episode of major depressive disorder (CMS/HCC)   • Muscle spasm   • Multiple thyroid nodules   • Neck mass   • Other dysphagia   • Displaced trimalleolar fracture of right lower leg, subsequent encounter for closed fracture with routine healing   • Fracture of first lumbar vertebra due to fall of 12 feet (CMS/HCC)   • Cervical radiculitis   • Sleep apnea   • Tachycardia   • Visit for wound check   • Surgery follow-up examination   • Ureteral calculus, left   • Acute left flank pain     Past Medical History:  Past Medical History:   Diagnosis Date   • Back pain    • Cervical radiculitis 2020   • Chronic kidney disease     stones   • Chronic midline low back pain without sciatica 2017   • Compression fracture of first lumbar vertebra (CMS/HCC)      history   • Hard to intubate     be careful pt states she is to tell she has a small mouth   • Mild single current episode of major depressive disorder (CMS/HCC) 2017   • Neck pain    • Sleep apnea     cpap     Past Surgical History:  Past Surgical History:   Procedure Laterality Date   • ANKLE SURGERY Right     hardware   • ANTERIOR CERVICAL DISCECTOMY W/ FUSION N/A 3/23/2020    Procedure: Cervical 4 to cervical 5 and cervical 5 to cervical 6 anterior cervical discectomy, fusion and instrumentation;  Surgeon: Segundo Thomas MD;  Location: [facility];  Service: Neurosurgery;  Laterality: N/A;   • KIDNEY STONE SURGERY     • KNEE SURGERY Right     hardware    torn acl & tendons and ligaments with pin and \"bungee cord\"     Home Meds:  Medications Prior to " Admission   Medication Sig Dispense Refill Last Dose   • aspirin (Aspirin Low Dose) 81 MG EC tablet Take 1 tablet by mouth Daily. 90 tablet 1 9/13/2020 at Unknown time   • DULoxetine (CYMBALTA) 60 MG capsule TAKE 1 CAPSULE BY MOUTH DAILY 90 capsule 1 9/13/2020 at Unknown time   • cyclobenzaprine (FLEXERIL) 10 MG tablet Take 1 tablet by mouth 2 (Two) Times a Day As Needed for Muscle Spasms. 60 tablet 0    • meclizine (ANTIVERT) 25 MG tablet Take 1 tablet by mouth 3 (Three) Times a Day As Needed for Dizziness (vertigo symptoms). 30 tablet 1    • meloxicam (MOBIC) 15 MG tablet Take 1 tablet by mouth Daily As Needed for Moderate Pain . Take with food and water 90 tablet 0      Current Meds:    Current Facility-Administered Medications:   •  acetaminophen (TYLENOL) tablet 650 mg, 650 mg, Oral, Q4H PRN **OR** acetaminophen (TYLENOL) 160 MG/5ML solution 650 mg, 650 mg, Oral, Q4H PRN **OR** acetaminophen (TYLENOL) suppository 650 mg, 650 mg, Rectal, Q4H PRN, Achterberg, Esther, APRN  •  aluminum-magnesium hydroxide-simethicone (MAALOX MAX) 400-400-40 MG/5ML suspension 15 mL, 15 mL, Oral, Q6H PRN, Achterberg, Esther, APRN  •  aspirin EC tablet 81 mg, 81 mg, Oral, Daily, Achterberg, Esther, APRN  •  bisacodyl (DULCOLAX) EC tablet 5 mg, 5 mg, Oral, Daily PRN, Achterberg, Esther, APRN  •  bisacodyl (DULCOLAX) suppository 10 mg, 10 mg, Rectal, Daily PRN, Achterberg, Esther, APRN  •  cyclobenzaprine (FLEXERIL) tablet 10 mg, 10 mg, Oral, BID PRN, Achterberg, Esther, APRN  •  dextrose 5 % and sodium chloride 0.45 % infusion, 125 mL/hr, Intravenous, Continuous, Hill Johnson MD, Last Rate: 125 mL/hr at 09/15/20 0026, 125 mL/hr at 09/15/20 0026  •  DULoxetine (CYMBALTA) DR capsule 60 mg, 60 mg, Oral, Daily, Esther Dennison APRN, 60 mg at 09/14/20 1159  •  HYDROcodone-acetaminophen (NORCO) 7.5-325 MG per tablet 1 tablet, 1 tablet, Oral, Q6H PRN, Hill Johnson MD, 1 tablet at 09/15/20 0026  •  influenza vac  "split quad (FLUZONE,FLUARIX,AFLURIA) injection 0.5 mL, 0.5 mL, Intramuscular, During Hospitalization, AchterCari meltonle, APRN  •  ketorolac (TORADOL) injection 15 mg, 15 mg, Intravenous, Q6H PRN, Achterberg, Esther, APRN, 15 mg at 20  •  magnesium hydroxide (MILK OF MAGNESIA) suspension 2400 mg/10mL 10 mL, 10 mL, Oral, Daily PRN, Achterberg, Esther, APRN  •  meclizine (ANTIVERT) tablet 25 mg, 25 mg, Oral, TID PRN, Achterberg, Esther, APRN  •  melatonin tablet 5 mg, 5 mg, Oral, Nightly PRN, Achterberg, Esther, APRN, 5 mg at 20  •  ondansetron (ZOFRAN) tablet 4 mg, 4 mg, Oral, Q6H PRN **OR** ondansetron (ZOFRAN) injection 4 mg, 4 mg, Intravenous, Q6H PRN, Achterberg, Esther, APRN  •  sodium chloride 0.9 % flush 10 mL, 10 mL, Intravenous, PRN, Cesar Vazquez MD  •  sodium chloride 0.9 % flush 10 mL, 10 mL, Intravenous, Q12H, Achterberg, Esther, APRN, 10 mL at 20  •  sodium chloride 0.9 % flush 10 mL, 10 mL, Intravenous, PRN, Achterberg, Esther, APRN  •  tamsulosin (FLOMAX) 24 hr capsule 0.4 mg, 0.4 mg, Oral, BID, Hill Johnson MD, 0.4 mg at 20  Allergies:  Methocarbamol    Review of Systems no fevers or chills.  No congestion or nasal discharge.    Objective:  tMax 24 hours:  Temp (24hrs), Av.7 °F (36.5 °C), Min:97.5 °F (36.4 °C), Max:98 °F (36.7 °C)    Vital Sign Ranges:  Temp:  [97.5 °F (36.4 °C)-98 °F (36.7 °C)] 98 °F (36.7 °C)  Heart Rate:  [] 95  Resp:  [17-18] 18  BP: (119-152)/(57-98) 125/66  Intake and Output Last 3 Shifts:  No intake/output data recorded.    Exam:  /66 (BP Location: Left arm, Patient Position: Lying)   Pulse 95   Temp 98 °F (36.7 °C) (Oral)   Resp 18   Ht 162.6 cm (64\")   Wt 120 kg (265 lb 6.4 oz)   LMP  (LMP Unknown)   SpO2 94%   BMI 45.56 kg/m²    General Appearance:    Alert, cooperative, no acute distress, general         appearance is normal   Head:    Normocephalic, without obvious abnormality, " atraumatic   Eyes:            Pupils/Irises normal. Exterior inspection conjunctivae       and lids normal.   Ears:    Normal external inspection   Nose:   Exterior inspection of nose is normal   Throat:   Lips, mucosa, and tongue normal   Lungs:     Respirations unlabored; normal effort, no audible     abnormality   CV:   Regular rhythm and normal rate, no edema   Abdomen:     examination of the abdomen is normal with     no masses, tenderness, or distension    :        Data Review:  All labs (24hrs):    Lab Results (last 24 hours)     Procedure Component Value Units Date/Time    Basic Metabolic Panel [[id number]]  (Abnormal) Collected: 09/15/20 0508    Specimen: Blood Updated: 09/15/20 0611     Glucose 129 mg/dL      BUN --     Comment: Testing performed by alternate method        Creatinine 1.14 mg/dL      Sodium 136 mmol/L      Potassium 3.7 mmol/L      Chloride 105 mmol/L      CO2 23.0 mmol/L      Calcium 8.3 mg/dL      eGFR Non African Amer 48 mL/min/1.73      BUN/Creatinine Ratio --     Comment: Testing not performed        Anion Gap 8.0 mmol/L     Narrative:      GFR Normal >60  Chronic Kidney Disease <60  Kidney Failure <15      BUN [[id number]] Collected: 09/15/20 0508    Specimen: Blood Updated: 09/15/20 0611    CBC Auto Differential [[id number]]  (Abnormal) Collected: 09/15/20 0508    Specimen: Blood Updated: 09/15/20 0555     WBC 6.30 10*3/mm3      RBC 4.41 10*6/mm3      Hemoglobin 12.8 g/dL      Hematocrit 39.4 %      MCV 89.5 fL      MCH 29.0 pg      MCHC 32.4 g/dL      RDW 15.0 %      RDW-SD 47.3 fl      MPV 7.7 fL      Platelets 150 10*3/mm3      Neutrophil % 71.6 %      Lymphocyte % 14.7 %      Monocyte % 9.3 %      Eosinophil % 3.7 %      Basophil % 0.7 %      Neutrophils, Absolute 4.50 10*3/mm3      Lymphocytes, Absolute 0.90 10*3/mm3      Monocytes, Absolute 0.60 10*3/mm3      Eosinophils, Absolute 0.20 10*3/mm3      Basophils, Absolute 0.00 10*3/mm3      nRBC 0.1 /100 WBC     COVID PRE-OP /  PRE-PROCEDURE SCREENING ORDER (NO ISOLATION) - Swab, Nasopharynx [[id number]]  (Normal) Collected: 09/14/20 1238    Specimen: Swab from Nasopharynx Updated: 09/14/20 1458    Narrative:      The following orders were created for panel order COVID PRE-OP / PRE-PROCEDURE SCREENING ORDER (NO ISOLATION) - Swab, Nasopharynx.  Procedure                               Abnormality         Status                     ---------                               -----------         ------                     Respiratory Panel PCR w/...[[id number]]  Normal              Final result                 Please view results for these tests on the individual orders.    Respiratory Panel PCR w/COVID-19(SARS-CoV-2) [facility] In-House, NP Swab in UTM/VTM, 3-4 HR TAT - Swab, Nasopharynx [[id number]]  (Normal) Collected: 09/14/20 1238    Specimen: Swab from Nasopharynx Updated: 09/14/20 1458     ADENOVIRUS, PCR Not Detected     Coronavirus 229E Not Detected     Coronavirus HKU1 Not Detected     Coronavirus NL63 Not Detected     Coronavirus OC43 Not Detected     COVID19 Not Detected     Human Metapneumovirus Not Detected     Human Rhinovirus/Enterovirus Not Detected     Influenza A PCR Not Detected     Influenza A H1 Not Detected     Influenza A H1 2009 PCR Not Detected     Influenza A H3 Not Detected     Influenza B PCR Not Detected     Parainfluenza Virus 1 Not Detected     Parainfluenza Virus 2 Not Detected     Parainfluenza Virus 3 Not Detected     Parainfluenza Virus 4 Not Detected     RSV, PCR Not Detected     Bordetella pertussis pcr Not Detected     Bordetella parapertussis PCR Not Detected     Chlamydophila pneumoniae PCR Not Detected     Mycoplasma pneumo by PCR Not Detected    Narrative:      Fact sheet for providers: https://docs.[employer]/wp-content/uploads/EDE9874-7017-JH4.1-EUA-Provider-Fact-Sheet-3.pdf    Fact sheet for patients:  https://docs.[employer]/wp-content/uploads/KNU7747-0876-DE0.1-EUA-Patient-Fact-Sheet-1.pdf    BUN [[id number]]  (Normal) Collected: 09/14/20 0614    Specimen: Blood Updated: 09/14/20 0739     BUN 17 mg/dL     Basic Metabolic Panel [[id number]]  (Abnormal) Collected: 09/14/20 0614    Specimen: Blood Updated: 09/14/20 0727     Glucose 125 mg/dL      BUN --     Comment: Testing performed by alternate method        Creatinine 0.89 mg/dL      Sodium 140 mmol/L      Potassium 4.2 mmol/L      Chloride 106 mmol/L      CO2 22.0 mmol/L      Calcium 9.2 mg/dL      eGFR Non African Amer 64 mL/min/1.73      BUN/Creatinine Ratio --     Comment: Testing not performed        Anion Gap 12.0 mmol/L     Narrative:      GFR Normal >60  Chronic Kidney Disease <60  Kidney Failure <15      [place] Draw [[id number]] Collected: 09/14/20 0614    Specimen: Blood Updated: 09/14/20 0715    Narrative:      The following orders were created for panel order [place] Draw.  Procedure                               Abnormality         Status                     ---------                               -----------         ------                     Light Blue Top[[id number]]                                   Final result               Green Top (Gel)[[id number]]                                  Final result               Lavender Top[[id number]]                                     Final result               Gold Top - SST[[id number]]                                   Final result                 Please view results for these tests on the individual orders.    Light Blue Top [[id number]] Collected: 09/14/20 0614    Specimen: Blood Updated: 09/14/20 0715     Extra Tube hold for add-on     Comment: Auto resulted       Gold Top - SST [[id number]] Collected: 09/14/20 0614    Specimen: Blood Updated: 09/14/20 0715     Extra Tube Hold for add-ons.     Comment: Auto resulted.       Green Top (Gel) [[id number]] Collected: 09/14/20 0614    Specimen: Blood Updated:  09/14/20 0715     Extra Tube Hold for add-ons.     Comment: Auto resulted.       Lavender Top [[id number]] Collected: 09/14/20 0614    Specimen: Blood Updated: 09/14/20 0715     Extra Tube hold for add-on     Comment: Auto resulted           Radiology:   Imaging Results (Last 72 Hours)     Procedure Component Value Units Date/Time    CT Abdomen Pelvis With Contrast [[id number]] Collected: 09/14/20 0822     Updated: 09/14/20 0833    Narrative:      CT ABDOMEN PELVIS W CONTRAST-     Date of Exam: 9/14/2020 7:43 AM     Indication: left flank pain.  . HISTORY of kidney stones. Prior  lithotripsy.     Comparison: None available.     Technique: Contiguous axial CT images were obtained from the lung bases  to the pubic symphysis following uneventful administration of 100 cc  Isovue-370. intravenous and oral contrast. Sagittal and coronal  reconstructions were performed.  Automated exposure control and  iterative reconstruction methods were used.     FINDINGS:     6 x 3 mm obstructing stone is located at the left ureterovesical  junction resulting in mild left hydronephrosis. There is slightly  diminished enhancement of the left kidney compared to the right. A 2 to  3 mm stone is seen within the left mid kidney. No right renal stone is  identified. Urinary bladder is normal.     Small calcifications in the pelvis bilaterally are favored to represent  phleboliths.     A small layering gallstones are present. The liver, spleen, pancreas,  adrenals are normal. A cyst at the left upper renal pole measures 1.8  cm. The appendix is not visualized. No pericecal inflammation is seen.  There is a moderate stool burden in the cecum and ascending colon. No  evidence of active bowel inflammation or bowel obstruction. Small second  duodenal segment diverticulum is noted.     The uterus and rectum are within normal limits. No adenopathy or free  fluid is identified.     Bandlike opacities are present in the bilateral lower lobes, favored  to  represent atelectasis. Heart size is normal. No basilar pleural  effusion. Tiny esophageal hiatal hernia.     Chronic appearing L1 compression fracture with 60% loss of vertebral  body height. There is approximately 3 mm bony retropulsion, but no  subluxation is seen. No acute osseous abnormalities are identified.          Impression:         1. 6 x 3 mm obstructing left ureterovesical junction stone with mild  left hydronephrosis.  2. Nonobstructing left renal stone.  3. Left renal cyst.  4. Uncomplicated cholelithiasis.  5. Bibasilar bandlike atelectatic changes.  6. Chronic L1 compression fracture.           Electronically Signed By-Dr. Dominique Prasad MD On:9/14/2020 8:27 AM  This report was finalized on [id number] by Dr. Dominique Prasad MD.          Assessment/Plan:    Principal Problem:    Ureteral calculus, left  Active Problems:    Chronic midline low back pain with bilateral sciatica    Mild single current episode of major depressive disorder (CMS/HCC)    Sleep apnea    Acute left flank pain    Left ureteral calculus    Plan  Cystoscopy, left ureteroscopic stone basket extraction, placement left ureteral stent      Hill Johnson MD  9/15/2020  07:12 EDT

## 2020-09-15 NOTE — ANESTHESIA POSTPROCEDURE EVALUATION
Patient: Jannet DEE Kitchen    Procedure Summary     Date: 09/15/20 Room / Location: [facility] OR 09 / [facility] MAIN OR    Anesthesia Start: 1129 Anesthesia Stop: 1158    Procedure: CYSTOSCOPY URETEROSCOPY STENT INSERTION (Left ) Diagnosis:       Ureteral calculus, left      (Ureteral calculus, left [N20.1])    Surgeon: Hill Johnson MD Provider: Nikki Lewis DO    Anesthesia Type: general ASA Status: 3          Anesthesia Type: general    Vitals  Vitals Value Taken Time   /62 09/15/20 1244   Temp 97.4 °F (36.3 °C) 09/15/20 1244   Pulse 110 09/15/20 1244   Resp 18 09/15/20 1244   SpO2 91 % 09/15/20 1244           Post Anesthesia Care and Evaluation    Patient location during evaluation: PACU  Patient participation: complete - patient participated  Level of consciousness: awake  Pain score: 0  Pain management: adequate  Airway patency: patent  Anesthetic complications: No anesthetic complications  PONV Status: none  Cardiovascular status: acceptable  Respiratory status: acceptable  Hydration status: acceptable

## 2020-09-15 NOTE — ANESTHESIA PROCEDURE NOTES
Airway  Urgency: elective    Date/Time: 9/15/2020 11:34 AM  Difficult airway    General Information and Staff    Patient location during procedure: OR  Anesthesiologist: Nikki Lewis DO    Indications and Patient Condition  Indications for airway management: airway protection    Preoxygenated: yes  MILS maintained throughout  Mask difficulty assessment: 1 - vent by mask    Final Airway Details  Final airway type: supraglottic airway      Successful airway: LMA  Size 4    Number of attempts at approach: 1  Assessment: lips, teeth, and gum same as pre-op

## 2020-09-15 NOTE — DISCHARGE SUMMARY
[facility] Medicine Services  DISCHARGE SUMMARY        Prepared For PCP:  Sandra Roe MD    Patient Name: Jannet Fierro  : 1955  MRN: [id number]      Date of Admission:   2020    Date of Discharge:  9/15/2020    Length of stay:  LOS: 0 days     Hospital Course     Presenting Problem:   Nausea [R11.0]  Left flank pain [R10.9]  Ureteral calculus, left [N20.1]      Active Hospital Problems    Diagnosis  POA   • Acute left flank pain [R10.9]  Yes   • Sleep apnea [G47.30]  Yes   • Mild single current episode of major depressive disorder (CMS/HCC) [F32.0]  Yes   • Chronic midline low back pain with bilateral sciatica [M54.41, M54.42, G89.29]  Yes      Resolved Hospital Problems    Diagnosis Date Resolved POA   • **Ureteral calculus, left [N20.1] 09/15/2020 Yes           Hospital Course:  Jannet Fierro is a 65 y.o. female who presented to [facility] ED on 2020 due to left flank pain which was similar to a past kidney stone.  CT a/p revealed left 6x3mm obstructive UVJ stone with mild left hydronephrosis.  No evidence of UTI or infection.  Admitted with pain control, fluids, and urology consulted.  On 9/15/2020 Urology performed cystoscopy with left ureteroscopy but stone was not found and was believed to have passed.  A stent was inserted and patient returned to room.  She was sent home with pain meds and a 10 day course of Keflex, she is to follow up with Urology in one week for repeat cystoscopy and stent removal.  Patient was in agreement with plan and discharged home.          Recommendation for Outpatient Providers:     1. Repeat UA as outpatient  2. Stent removal and repeat cystoscopy in one week  3. Complete 10 day course of Keflex        Reasons For Change In Medications and Indications for New Medications:     as above    Day of Discharge     HPI:   2020: Patient seen and examined at bedside, pain improved.  Tolerating IVF, urology plans surgery tomorrow.           09/15/2020: cystoscopy, ureteroscopy performed today and stone not found.  Believed to have passed, stent inserted.  Patient stable for discharge home, follow up in one week for repeat cystoscopy and stent removal. 10 day course Keflex.  Patient in agreement with plan.    Vital Signs:   Temp:  [97.4 °F (36.3 °C)-99.4 °F (37.4 °C)] 97.4 °F (36.3 °C)  Heart Rate:  [] 110  Resp:  [12-20] 18  BP: (119-145)/(54-67) 140/62     Physical Exam:  Physical Exam  Constitutional:       General: She is not in acute distress.     Appearance: Normal appearance.   HENT:      Head: Normocephalic and atraumatic.      Nose: Nose normal.      Mouth/Throat:      Mouth: Mucous membranes are moist.   Eyes:      Extraocular Movements: Extraocular movements intact.      Pupils: Pupils are equal, round, and reactive to light.   Neck:      Musculoskeletal: Normal range of motion. No neck rigidity.   Cardiovascular:      Rate and Rhythm: Normal rate.      Heart sounds: No murmur. No friction rub. No gallop.    Pulmonary:      Effort: Pulmonary effort is normal. No respiratory distress.      Breath sounds: Normal breath sounds.   Abdominal:      General: Bowel sounds are normal. There is no distension.      Palpations: Abdomen is soft.      Tenderness: There is no abdominal tenderness.   Musculoskeletal: Normal range of motion.         General: No swelling or tenderness.   Skin:     General: Skin is warm.      Coloration: Skin is not jaundiced.      Findings: No bruising.   Neurological:      General: No focal deficit present.      Mental Status: She is alert and oriented to person, place, and time. Mental status is at baseline.   Psychiatric:         Mood and Affect: Mood normal.         Behavior: Behavior normal.         Thought Content: Thought content normal.         Pertinent  and/or Most Recent Results     Results from last 7 days   Lab Units 09/15/20  0508 09/14/20  0614   WBC 10*3/mm3 6.30 10.80   HEMOGLOBIN g/dL 12.8 15.4    HEMATOCRIT % 39.4 47.7*   PLATELETS 10*3/mm3 150 213   SODIUM mmol/L 136 140   POTASSIUM mmol/L 3.7 4.2   CHLORIDE mmol/L 105 106   CO2 mmol/L 23.0 22.0   BUN  11 17   CREATININE mg/dL 1.14* 0.89   GLUCOSE mg/dL 129* 125*   CALCIUM mg/dL 8.3* 9.2           Invalid input(s): PROT, LABALBU        Invalid input(s): TG, LDLCALC, LDLREALC        Brief Urine Lab Results  (Last result in the past 365 days)      Color   Clarity   Blood   Leuk Est   Nitrite   Protein   CREAT   Urine HCG        09/14/20 0632 Yellow Clear Moderate (2+) Small (1+) Negative Negative               Microbiology Results Abnormal     Procedure Component Value - Date/Time    Urine Culture - Urine, Urine, Clean Catch [[id number]]  (Normal) Collected: 09/14/20 0632    Lab Status: Final result Specimen: Urine, Clean Catch Updated: 09/15/20 0909     Urine Culture No growth    COVID PRE-OP / PRE-PROCEDURE SCREENING ORDER (NO ISOLATION) - Swab, Nasopharynx [[id number]]  (Normal) Collected: 09/14/20 1238    Lab Status: Final result Specimen: Swab from Nasopharynx Updated: 09/14/20 1458    Narrative:      The following orders were created for panel order COVID PRE-OP / PRE-PROCEDURE SCREENING ORDER (NO ISOLATION) - Swab, Nasopharynx.  Procedure                               Abnormality         Status                     ---------                               -----------         ------                     Respiratory Panel PCR w/...[[id number]]  Normal              Final result                 Please view results for these tests on the individual orders.    Respiratory Panel PCR w/COVID-19(SARS-CoV-2) [facility] In-House, NP Swab in UTM/VTM, 3-4 HR TAT - Swab, Nasopharynx [[id number]]  (Normal) Collected: 09/14/20 1238    Lab Status: Final result Specimen: Swab from Nasopharynx Updated: 09/14/20 1458     ADENOVIRUS, PCR Not Detected     Coronavirus 229E Not Detected     Coronavirus HKU1 Not Detected     Coronavirus NL63 Not Detected      Coronavirus OC43 Not Detected     COVID19 Not Detected     Human Metapneumovirus Not Detected     Human Rhinovirus/Enterovirus Not Detected     Influenza A PCR Not Detected     Influenza A H1 Not Detected     Influenza A H1 2009 PCR Not Detected     Influenza A H3 Not Detected     Influenza B PCR Not Detected     Parainfluenza Virus 1 Not Detected     Parainfluenza Virus 2 Not Detected     Parainfluenza Virus 3 Not Detected     Parainfluenza Virus 4 Not Detected     RSV, PCR Not Detected     Bordetella pertussis pcr Not Detected     Bordetella parapertussis PCR Not Detected     Chlamydophila pneumoniae PCR Not Detected     Mycoplasma pneumo by PCR Not Detected    Narrative:      Fact sheet for providers: https://docs.[employer]/wp-content/uploads/UAB5708-3526-PA6.1-EUA-Provider-Fact-Sheet-3.pdf    Fact sheet for patients: https://docs.[employer]/wp-content/uploads/LTA4961-5112-FV8.1-EUA-Patient-Fact-Sheet-1.pdf          Ct Abdomen Pelvis With Contrast    Result Date: 9/14/2020  Impression:  1. 6 x 3 mm obstructing left ureterovesical junction stone with mild left hydronephrosis. 2. Nonobstructing left renal stone. 3. Left renal cyst. 4. Uncomplicated cholelithiasis. 5. Bibasilar bandlike atelectatic changes. 6. Chronic L1 compression fracture.    Electronically Signed By-Dr. Dominique Prasad MD On:9/14/2020 8:27 AM This report was finalized on [id number] by Dr. Dominique Prasad MD.                Results for orders placed during the hospital encounter of 05/25/16   Adult transthoracic echo complete with contrast    Narrative · Left ventricular wall thickness is consistent with borderline concentric   hypertrophy.  · All left ventricular wall segments contract normally.  · Left ventricular function is normal. Estimated EF = 65%.  · Right ventricular cavity is mildly dilated.  · Mildly reduced right ventricular systolic function noted.                  Test Results Pending at Discharge    Repeat cystoscopy and  stent removal in one week    Procedures Performed  Procedure(s):  CYSTOSCOPY URETEROSCOPY STENT INSERTION         Consults:   Consults     Date and Time Order Name Status Description    9/14/2020 0901 Hospitalist (on-call MD unless specified) Completed     9/14/2020 0844 Urology (on-call MD unless specified) Completed             Discharge Details        Discharge Medications      New Medications      Instructions Start Date   cephalexin 500 MG capsule  Commonly known as: KEFLEX   500 mg, Oral, 3 Times Daily      HYDROcodone-acetaminophen 7.5-325 MG per tablet  Commonly known as: Norco   1 tablet, Oral, Every 6 Hours PRN         Continue These Medications      Instructions Start Date   aspirin 81 MG EC tablet  Commonly known as: Aspirin Low Dose   81 mg, Oral, Daily      cyclobenzaprine 10 MG tablet  Commonly known as: FLEXERIL   10 mg, Oral, 2 Times Daily PRN      DULoxetine 60 MG capsule  Commonly known as: CYMBALTA   60 mg, Oral, Daily      meclizine 25 MG tablet  Commonly known as: ANTIVERT   25 mg, Oral, 3 Times Daily PRN      meloxicam 15 MG tablet  Commonly known as: MOBIC   15 mg, Oral, Daily PRN, Take with food and water             Allergies   Allergen Reactions   • Methocarbamol Itching         Discharge Disposition:  Home or Self Care    Diet:  Hospital:  Diet Order   Procedures   • Diet Regular         Discharge Activity:   Activity Instructions     Activity as Tolerated              CODE STATUS:    Code Status and Medical Interventions:   Ordered at: 09/14/20 8632     Level Of Support Discussed With:    Patient     Code Status:    CPR     Medical Interventions (Level of Support Prior to Arrest):    Full         Follow-up Appointments  No future appointments.    Additional Instructions for the Follow-ups that You Need to Schedule     Call MD With Problems / Concerns   As directed      Instructions: Call [phone] or email [email or web address] for problems or concerns.    Order Comments:  Instructions: Call [phone] or email [email or web address] for problems or concerns.          Discharge Follow-up with PCP   As directed       Currently Documented PCP:    Sandra Roe MD    PCP Phone Number:    [phone]     Follow Up Details: Follow up with PCP within 2 weeks         Discharge Follow-up with Specified Provider: Dr. Hill Johnson; 1 Week   As directed      To: Dr. Hill Johnson    Follow Up: 1 Week    Follow Up Details: Cystoscopy and stent removal                 Condition on Discharge:      Stable      This patient has been examined wearing appropriate Personal Protective Equipment and discussed with Patient. 09/15/20      Electronically signed by Domo Jaeger DO, 09/15/20, 2:15 PM EDT.      Time: I spent  34  minutes on this discharge activity which included face-to-face encounter with the patient/reviewing the data in the system/coordination of the care with the nursing staff as well as consultants/documentation/entering orders.

## 2020-09-16 ENCOUNTER — TRANSITIONAL CARE MANAGEMENT TELEPHONE ENCOUNTER (OUTPATIENT)
Dept: CALL CENTER | Facility: HOSPITAL | Age: 65
End: 2020-09-16

## 2020-09-16 NOTE — OUTREACH NOTE
Call Center TCM Note      Responses   [facility] patient discharged from?  [facility]   COVID-19 Test Status  Negative   Does the patient have one of the following disease processes/diagnoses(primary or secondary)?  Other   TCM attempt successful?  Yes   Call start time  1453   Call end time  1456   Discharge diagnosis  Acute left flank pain,    Ureteral calculus   Meds reviewed with patient/caregiver?  Yes   Is the patient having any side effects they believe may be caused by any medication additions or changes?  No   Does the patient have all medications ordered at discharge?  Yes   Is the patient taking all medications as directed (includes completed medication regime)?  Yes   Comments regarding appointments  Pt will call urology to schedule appt for next week   Does the patient have a primary care provider?   Yes   Does the patient have an appointment with their PCP within 7 days of discharge?  No   What is preventing the patient from scheduling follow up appointments within 7 days of discharge?  -- [There was available appt with PCP for hospital d/c f/u until 10/5/20]   Has the patient kept scheduled appointments due by today?  N/A   Pulse Ox monitoring  None   Psychosocial issues?  No   Did the patient receive a copy of their discharge instructions?  Yes   Nursing interventions  Reviewed instructions with patient   What is the patient's perception of their health status since discharge?  Improving   Is the patient/caregiver able to teach back signs and symptoms related to disease process for when to call PCP?  Yes   Is the patient/caregiver able to teach back signs and symptoms related to disease process for when to call 911?  Yes   Is the patient/caregiver able to teach back the hierarchy of who to call/visit for symptoms/problems? PCP, Specialist, Home health nurse, Urgent Care, ED, 911  Yes   If the patient is a current smoker, are they able to teach back resources for cessation?  -- [Nonsmoker]   TCM call  completed?  Yes          Deonna Zepeda RN    9/16/2020, 14:56 EDT

## 2020-09-16 NOTE — OUTREACH NOTE
Prep Survey      Responses   [facility] patient discharged from?  [place]   Is LACE score < 7 ?  Yes   Eligibility  [facility]   Date of Admission  09/14/20   Date of Discharge  09/15/20   Discharge Disposition  Home or Self Care   Discharge diagnosis  Acute left flank pain,    Ureteral calculus   COVID-19 Test Status  Negative   Does the patient have one of the following disease processes/diagnoses(primary or secondary)?  Other   Does the patient have Home health ordered?  No   Is there a DME ordered?  No   Prep survey completed?  Yes          Debi Centeno RN

## 2020-09-16 NOTE — PROGRESS NOTES
Case Management Discharge Note                Selected Continued Care - Discharged on 9/15/2020 Admission date: 9/14/2020 - Discharge disposition:                 Final Discharge Disposition Code: 01 - home or self-care

## 2020-10-01 RX ORDER — DULOXETIN HYDROCHLORIDE 60 MG/1
60 CAPSULE, DELAYED RELEASE ORAL DAILY
Qty: 90 CAPSULE | Refills: 1 | Status: SHIPPED | OUTPATIENT
Start: 2020-10-01 | End: 2021-03-17

## 2020-10-19 RX ORDER — MELOXICAM 15 MG/1
TABLET ORAL
Qty: 30 TABLET | Refills: 0 | Status: SHIPPED | OUTPATIENT
Start: 2020-10-19 | End: 2020-11-17 | Stop reason: SDUPTHER

## 2020-10-26 ENCOUNTER — TRANSCRIBE ORDERS (OUTPATIENT)
Dept: ADMINISTRATIVE | Facility: HOSPITAL | Age: 65
End: 2020-10-26

## 2020-10-26 DIAGNOSIS — N13.30 HYDRONEPHROSIS, UNSPECIFIED HYDRONEPHROSIS TYPE: Primary | ICD-10-CM

## 2020-11-09 ENCOUNTER — HOSPITAL ENCOUNTER (OUTPATIENT)
Dept: GENERAL RADIOLOGY | Facility: HOSPITAL | Age: 65
Discharge: HOME | End: 2020-11-09
Admitting: UROLOGY

## 2020-11-09 DIAGNOSIS — N13.30 HYDRONEPHROSIS, UNSPECIFIED HYDRONEPHROSIS TYPE: ICD-10-CM

## 2020-11-09 LAB — CREAT BLDA-MCNC: 1.1 MG/DL (ref 0.6–1.3)

## 2020-11-09 PROCEDURE — 0 IOPAMIDOL PER 1 ML: Performed by: UROLOGY

## 2020-11-09 PROCEDURE — 74400 UROGRAPHY IV +-KUB TOMOG: CPT

## 2020-11-09 PROCEDURE — 82565 ASSAY OF CREATININE: CPT

## 2020-11-09 RX ADMIN — IOPAMIDOL 100 ML: 755 INJECTION, SOLUTION INTRAVENOUS at 11:15

## 2020-11-17 RX ORDER — MELOXICAM 15 MG/1
15 TABLET ORAL DAILY
Qty: 30 TABLET | Refills: 0 | Status: SHIPPED | OUTPATIENT
Start: 2020-11-17 | End: 2020-12-22 | Stop reason: SDUPTHER

## 2020-11-23 ENCOUNTER — TRANSCRIBE ORDERS (OUTPATIENT)
Dept: ADMINISTRATIVE | Facility: HOSPITAL | Age: 65
End: 2020-11-23

## 2020-11-23 DIAGNOSIS — E04.1 THYROID NODULE: Primary | ICD-10-CM

## 2020-12-02 RX ORDER — ASPIRIN 81 MG/1
81 TABLET ORAL DAILY
Qty: 90 TABLET | Refills: 1 | Status: SHIPPED | OUTPATIENT
Start: 2020-12-02 | End: 2021-03-17

## 2020-12-22 RX ORDER — MELOXICAM 15 MG/1
15 TABLET ORAL DAILY
Qty: 30 TABLET | Refills: 0 | Status: SHIPPED | OUTPATIENT
Start: 2020-12-22 | End: 2021-01-15

## 2021-01-13 ENCOUNTER — IMMUNIZATION (OUTPATIENT)
Dept: VACCINE CLINIC | Facility: HOSPITAL | Age: 66
End: 2021-01-13

## 2021-01-13 PROCEDURE — 91300 HC SARSCOV02 VAC 30MCG/0.3ML IM: CPT | Performed by: INTERNAL MEDICINE

## 2021-01-13 PROCEDURE — 0001A: CPT | Performed by: INTERNAL MEDICINE

## 2021-01-15 RX ORDER — MELOXICAM 15 MG/1
15 TABLET ORAL DAILY
Qty: 30 TABLET | Refills: 0 | Status: SHIPPED | OUTPATIENT
Start: 2021-01-15 | End: 2021-02-15

## 2021-01-18 ENCOUNTER — HOSPITAL ENCOUNTER (OUTPATIENT)
Dept: ULTRASOUND IMAGING | Facility: HOSPITAL | Age: 66
End: 2021-01-18

## 2021-02-03 ENCOUNTER — IMMUNIZATION (OUTPATIENT)
Dept: VACCINE CLINIC | Facility: HOSPITAL | Age: 66
End: 2021-02-03

## 2021-02-03 PROCEDURE — 91300 HC SARSCOV02 VAC 30MCG/0.3ML IM: CPT | Performed by: INTERNAL MEDICINE

## 2021-02-03 PROCEDURE — 0002A: CPT | Performed by: INTERNAL MEDICINE

## 2021-02-15 RX ORDER — MELOXICAM 15 MG/1
15 TABLET ORAL DAILY
Qty: 90 TABLET | Refills: 0 | Status: SHIPPED | OUTPATIENT
Start: 2021-02-15 | End: 2021-05-17

## 2021-03-17 RX ORDER — ASPIRIN 81 MG/1
TABLET ORAL
Qty: 90 TABLET | Refills: 0 | Status: SHIPPED | OUTPATIENT
Start: 2021-03-17 | End: 2021-08-16 | Stop reason: HOSPADM

## 2021-03-17 RX ORDER — DULOXETIN HYDROCHLORIDE 60 MG/1
60 CAPSULE, DELAYED RELEASE ORAL DAILY
Qty: 90 CAPSULE | Refills: 0 | Status: SHIPPED | OUTPATIENT
Start: 2021-03-17 | End: 2021-08-16 | Stop reason: HOSPADM

## 2021-04-10 ENCOUNTER — LAB (OUTPATIENT)
Dept: LAB | Facility: HOSPITAL | Age: 66
End: 2021-04-10

## 2021-04-10 ENCOUNTER — TRANSCRIBE ORDERS (OUTPATIENT)
Dept: ADMINISTRATIVE | Facility: HOSPITAL | Age: 66
End: 2021-04-10

## 2021-04-10 DIAGNOSIS — D64.89 OTHER SPECIFIED ANEMIAS: ICD-10-CM

## 2021-04-10 DIAGNOSIS — D64.89 OTHER SPECIFIED ANEMIAS: Primary | ICD-10-CM

## 2021-04-10 LAB — FERRITIN SERPL-MCNC: 122 NG/ML (ref 13–150)

## 2021-04-10 PROCEDURE — 36415 COLL VENOUS BLD VENIPUNCTURE: CPT

## 2021-04-10 PROCEDURE — 82728 ASSAY OF FERRITIN: CPT

## 2021-05-17 RX ORDER — MELOXICAM 15 MG/1
15 TABLET ORAL DAILY
Qty: 90 TABLET | Refills: 0 | Status: SHIPPED | OUTPATIENT
Start: 2021-05-17 | End: 2021-08-16 | Stop reason: HOSPADM

## 2021-06-03 DIAGNOSIS — Z76.89 ENCOUNTER FOR SUPPORT AND COORDINATION OF TRANSITION OF CARE: ICD-10-CM

## 2021-06-03 DIAGNOSIS — Z83.49 FAMILY HISTORY OF THYROID DISEASE: ICD-10-CM

## 2021-06-03 DIAGNOSIS — E04.2 MULTIPLE THYROID NODULES: Primary | ICD-10-CM

## 2021-08-11 RX ORDER — AMOXICILLIN 875 MG/1
TABLET, COATED ORAL
COMMUNITY
Start: 2021-03-25 | End: 2021-08-16 | Stop reason: HOSPADM

## 2021-08-11 RX ORDER — CEPHALEXIN 500 MG/1
CAPSULE ORAL
COMMUNITY
Start: 2020-11-23 | End: 2021-08-16 | Stop reason: HOSPADM

## 2021-08-11 RX ORDER — ROPINIROLE 2 MG/1
2 TABLET, FILM COATED ORAL
COMMUNITY
Start: 2021-04-06 | End: 2021-08-16 | Stop reason: HOSPADM

## 2021-08-16 ENCOUNTER — OFFICE VISIT (OUTPATIENT)
Dept: ENDOCRINOLOGY | Facility: CLINIC | Age: 66
End: 2021-08-16

## 2021-08-16 VITALS
WEIGHT: 242 LBS | TEMPERATURE: 96.9 F | DIASTOLIC BLOOD PRESSURE: 68 MMHG | HEIGHT: 64 IN | BODY MASS INDEX: 41.32 KG/M2 | SYSTOLIC BLOOD PRESSURE: 145 MMHG | OXYGEN SATURATION: 97 % | HEART RATE: 83 BPM

## 2021-08-16 DIAGNOSIS — G89.29 CHRONIC RIGHT SHOULDER PAIN: ICD-10-CM

## 2021-08-16 DIAGNOSIS — E04.2 MULTIPLE THYROID NODULES: Primary | ICD-10-CM

## 2021-08-16 DIAGNOSIS — M25.511 CHRONIC RIGHT SHOULDER PAIN: ICD-10-CM

## 2021-08-16 PROBLEM — G47.33 OBSTRUCTIVE SLEEP APNEA SYNDROME: Status: ACTIVE | Noted: 2017-02-21

## 2021-08-16 PROCEDURE — 99244 OFF/OP CNSLTJ NEW/EST MOD 40: CPT | Performed by: INTERNAL MEDICINE

## 2021-08-16 RX ORDER — HYDROCODONE BITARTRATE AND ACETAMINOPHEN 5; 325 MG/1; MG/1
1 TABLET ORAL EVERY 6 HOURS PRN
COMMUNITY
Start: 2021-08-12 | End: 2021-10-18

## 2021-08-16 RX ORDER — IBUPROFEN 800 MG/1
TABLET ORAL
COMMUNITY
Start: 2021-08-12 | End: 2021-10-18

## 2021-08-16 NOTE — PROGRESS NOTES
Endocrine Consult Outpatient  Referred by Dr. Sandra Roe for thyroid nodules consultation  Patient Care Team:  Sandra Roe MD as PCP - General (Family Medicine)  Ammy José MD as Consulting Physician (Pulmonary Disease)     Chief Complaint: Thyroid nodules        HPI: This is a 65-year-old female with history of fibromyalgia has been having some chronic generalized pain and was found to have some thyroid nodules has seen Dr. Balbuena an ENT specialist back at least couple of years ago.  She was found to have small subcentimeter nodules and no biopsy has been done.  She is now referred here for further evaluation management.  She is not taking any thyroid medications at this time.  There is no family history of thyroid cancer, no history of radiation exposure.  No trouble swallowing or choking or change in the voice or hoarseness.  No significant issues with fatigue and tiredness.  She has gained about 80 pounds of weight in the last 4 years but has stopped all her medications and feels like he has lost about 10 to 15 pounds of weight.    Data reviewed:  T4, Free (03/25/2020 15:03)   TSH (03/25/2020 15:03)   US Thyroid (08/12/2019 08:15)       Past Medical History:   Diagnosis Date   • Back pain    • Cervical radiculitis 2/11/2020   • Chronic kidney disease     stones   • Chronic midline low back pain without sciatica 11/29/2017   • Compression fracture of first lumbar vertebra (CMS/HCC)     2015 history   • Hard to intubate     be careful pt states she is to tell she has a small mouth   • Mild single current episode of major depressive disorder (CMS/HCC) 11/29/2017   • Neck pain    • Sleep apnea     cpap       Social History     Socioeconomic History   • Marital status:      Spouse name: Not on file   • Number of children: Not on file   • Years of education: Not on file   • Highest education level: Not on file   Tobacco Use   • Smoking status: Never Smoker   • Smokeless tobacco: Never Used   Vaping Use    • Vaping Use: Never used   Substance and Sexual Activity   • Alcohol use: Yes     Comment: less than one drink once per month   • Drug use: Never   • Sexual activity: Defer       Family History   Problem Relation Age of Onset   • Heart attack Mother 71   • Hypothyroidism Mother    • Hyperlipidemia Mother    • Hypertension Father    • Skin cancer Father 91   • Thyroid disease Daughter 31   • Malig Hyperthermia Neg Hx        Allergies   Allergen Reactions   • Methocarbamol Itching       ROS:   Constitutional:  Denies fatigue, tiredness.    Eyes:  Denies change in visual acuity   HENT:  Denies nasal congestion or sore throat   Respiratory: denies cough, shortness of breath.   Cardiovascular:  denies chest pain, edema   GI:  Denies abdominal pain, nausea, vomiting.    :  Denies dysuria   Musculoskeletal:  Denies back pain, admit right shoulder pain  Integument:  Denies dry skin, rash   Neurologic:  Denies headache, focal weakness or sensory changes   Endocrine:  Denies polyuria or polydipsia   Psychiatric:  Denies depression or anxiety      Vitals:    08/16/21 1354   BP: 145/68   Pulse: 83   Temp: 96.9 °F (36.1 °C)   SpO2: 97%     Body mass index is 41.54 kg/m².      Physical Exam:  GEN: NAD, conversant  EYES: EOMI, PERRL, no conjunctival erythema  NECK: no thyromegaly, full ROM   CV: RRR, no murmurs/rubs/gallops, no peripheral edema  LUNG: CTAB, no wheezes/rales/ronchi  SKIN: no rashes, no acanthosis  MSK: no deformities, full ROM of all extremities  NEURO: no tremors, DTR normal  PSYCH: AOX3, appropriate mood, affect normal      Results Review:     I reviewed the patient's new clinical results.    Lab Results   Component Value Date    GLUCOSE 129 (H) 09/15/2020    BUN  09/15/2020      Comment:      Testing performed by alternate method    BUN 11 09/15/2020    CREATININE 1.10 11/09/2020    EGFRIFNONA 48 (L) 09/15/2020    EGFRIFAFRI 104 09/04/2019    BCR  09/15/2020      Comment:      Testing not performed    K 3.7  09/15/2020    CO2 23.0 09/15/2020    CALCIUM 8.3 (L) 09/15/2020    PROTENTOTREF 7.2 09/04/2019    ALBUMIN 4.50 09/04/2019    LABIL2 1.7 09/04/2019    AST 23 09/04/2019    ALT 28 09/04/2019    TRIG 115 09/04/2019    HDL 48 09/04/2019    LDL 89 09/04/2019     No results found for: HGBA1C  Lab Results   Component Value Date    CREATININE 1.10 11/09/2020     Lab Results   Component Value Date    TSH 0.533 03/25/2020    FREET4 1.25 03/25/2020    THYROIDAB 7 03/13/2018       Medication Review: Reviewed.       Current Outpatient Medications:   •  HYDROcodone-acetaminophen (NORCO) 5-325 MG per tablet, Take 1 tablet by mouth Every 6 (Six) Hours As Needed. for pain, Disp: , Rfl:   •  ibuprofen (ADVIL,MOTRIN) 800 MG tablet, , Disp: , Rfl:     Assessment/Plan   Multiple thyroid nodules: This is a 65-year-old female who had ultrasound thyroid done back in 2019 showed small subcentimeter thyroid nodules.  I will recheck TSH with free T4, TPO antibodies and thyroid ultrasound for further evaluation and then make further recommendations.    Right shoulder pain: We will refer to Dr. Masoud Espinosa in for further evaluation.    Thank you very much for the consultation.           Coleen Richard MD FACE.

## 2021-08-16 NOTE — PATIENT INSTRUCTIONS
Please get your labs done today or next few days  Arrange for thyroid ultrasound in next few days  Arrange for consultation with Dr. Masoud Espinosa for your right shoulder pain.

## 2021-08-19 ENCOUNTER — HOSPITAL ENCOUNTER (OUTPATIENT)
Dept: ULTRASOUND IMAGING | Facility: HOSPITAL | Age: 66
Discharge: HOME | End: 2021-08-19
Admitting: INTERNAL MEDICINE

## 2021-08-19 DIAGNOSIS — G89.29 CHRONIC RIGHT SHOULDER PAIN: ICD-10-CM

## 2021-08-19 DIAGNOSIS — M25.511 CHRONIC RIGHT SHOULDER PAIN: ICD-10-CM

## 2021-08-19 DIAGNOSIS — E04.2 MULTIPLE THYROID NODULES: ICD-10-CM

## 2021-08-19 PROCEDURE — 76536 US EXAM OF HEAD AND NECK: CPT

## 2021-08-24 ENCOUNTER — LAB (OUTPATIENT)
Dept: LAB | Facility: HOSPITAL | Age: 66
End: 2021-08-24

## 2021-08-24 DIAGNOSIS — G89.29 CHRONIC RIGHT SHOULDER PAIN: ICD-10-CM

## 2021-08-24 DIAGNOSIS — M25.511 CHRONIC RIGHT SHOULDER PAIN: ICD-10-CM

## 2021-08-24 DIAGNOSIS — E04.2 MULTIPLE THYROID NODULES: ICD-10-CM

## 2021-08-24 LAB
T4 FREE SERPL-MCNC: 1.1 NG/DL (ref 0.93–1.7)
TSH SERPL DL<=0.05 MIU/L-ACNC: 3.19 UIU/ML (ref 0.27–4.2)

## 2021-08-24 PROCEDURE — 86376 MICROSOMAL ANTIBODY EACH: CPT

## 2021-08-24 PROCEDURE — 84439 ASSAY OF FREE THYROXINE: CPT

## 2021-08-24 PROCEDURE — 84443 ASSAY THYROID STIM HORMONE: CPT

## 2021-08-24 PROCEDURE — 36415 COLL VENOUS BLD VENIPUNCTURE: CPT

## 2021-08-25 LAB — THYROPEROXIDASE AB SERPL-ACNC: <8 IU/ML (ref 0–34)

## 2021-08-30 ENCOUNTER — OFFICE VISIT (OUTPATIENT)
Dept: ORTHOPEDIC SURGERY | Facility: CLINIC | Age: 66
End: 2021-08-30

## 2021-08-30 VITALS
HEART RATE: 89 BPM | BODY MASS INDEX: 41.32 KG/M2 | SYSTOLIC BLOOD PRESSURE: 120 MMHG | WEIGHT: 242 LBS | DIASTOLIC BLOOD PRESSURE: 78 MMHG | HEIGHT: 64 IN

## 2021-08-30 DIAGNOSIS — M25.511 ACUTE PAIN OF RIGHT SHOULDER: Primary | ICD-10-CM

## 2021-08-30 PROCEDURE — 99243 OFF/OP CNSLTJ NEW/EST LOW 30: CPT | Performed by: ORTHOPAEDIC SURGERY

## 2021-08-30 RX ORDER — CLINDAMYCIN HYDROCHLORIDE 150 MG/1
CAPSULE ORAL
COMMUNITY
Start: 2021-08-24 | End: 2021-11-16

## 2021-08-30 NOTE — PROGRESS NOTES
"     Patient ID: Jannet Fierro is a 65 y.o. female.    Chief Complaint:    Chief Complaint   Patient presents with   • Right Shoulder - Initial Evaluation       HPI:  Jannet is a 65-year-old female here with longstanding right shoulder pain referred from Dr. Richard.  There is no injury.  She has pain deep in the shoulder with referral to the deltoid and bicep.  She has a feeling of cold and numbness and burning all the way down to the hand.  She has a history of cervical surgery in 2018.  There has been treatment with her shoulder physical therapy and medication and ice and heat without significant relief  Past Medical History:   Diagnosis Date   • Back pain    • Cervical radiculitis 2/11/2020   • Chronic kidney disease     stones   • Chronic midline low back pain without sciatica 11/29/2017   • Compression fracture of first lumbar vertebra (CMS/[facility])     2015 history   • Hard to intubate     be careful pt states she is to tell she has a small mouth   • Mild single current episode of major depressive disorder (CMS/[facility]) 11/29/2017   • Neck pain    • Sleep apnea     cpap       Past Surgical History:   Procedure Laterality Date   • ANKLE SURGERY Right     hardware   • ANTERIOR CERVICAL DISCECTOMY W/ FUSION N/A 3/23/2020    Procedure: Cervical 4 to cervical 5 and cervical 5 to cervical 6 anterior cervical discectomy, fusion and instrumentation;  Surgeon: Segundo Thomas MD;  Location: [facility];  Service: Neurosurgery;  Laterality: N/A;   • CYSTOSCOPY, URETEROSCOPY, RETROGRADE PYELOGRAM, STENT INSERTION Left 9/15/2020    Procedure: CYSTOSCOPY URETEROSCOPY STENT INSERTION;  Surgeon: Hill Johnson MD;  Location: [facility];  Service: Urology;  Laterality: Left;   • KIDNEY STONE SURGERY     • KNEE SURGERY Right     hardware    torn acl & tendons and ligaments with pin and \"bungee cord\"       Family History   Problem Relation Age of Onset   • Heart attack Mother 71   • Hypothyroidism Mother    • " "Hyperlipidemia Mother    • Hypertension Father    • Skin cancer Father 91   • Thyroid disease Daughter 31   • Malig Hyperthermia Neg Hx           Social History     Occupational History   • Not on file   Tobacco Use   • Smoking status: Never Smoker   • Smokeless tobacco: Never Used   Vaping Use   • Vaping Use: Never used   Substance and Sexual Activity   • Alcohol use: Yes     Comment: less than one drink once per month   • Drug use: Never   • Sexual activity: Defer      Review of Systems   Cardiovascular: Negative for chest pain.   Musculoskeletal: Positive for arthralgias.       Objective:    /78   Pulse 89   Ht 162.6 cm (64\")   Wt 110 kg (242 lb)   LMP  (LMP Unknown)   BMI 41.54 kg/m²     Physical Examination:  Right shoulder demonstrates intact skin.  There is pain in the impingement area.  Passive elevation 160 abduction 130 external rotation 30 internal rotation S1.  She has pain and weakness on Speed, Harper, supraspinatus testing.  Belly press and liftoff are 4/5.  Sensory and motor exam are intact all distributions. Radial pulse is palpable and capillary refill is less than two seconds to all digits.    Imaging:  right Shoulder X-Ray  Indication: Longstanding right shoulder pain and stiffness  AP Y and Lateral views  Findings: Well-maintained joint spaces  no bony lesion  Soft tissues normal  normal joint spaces  Hardware appropriately positioned not applicable      no prior studies available for comparison.    Assessment:  Right shoulder pain and stiffness cuff tear versus frozen shoulder    Plan:  I recommend MRI, see me after imaging      Procedures         Disclaimer: Please note that areas of this note were completed with computer voice recognition software.  Quite often unanticipated grammatical, syntax, homophones, and other interpretive errors are inadvertently transcribed by the computer software. Please excuse any errors that have escaped final proofreading.  "

## 2021-08-30 NOTE — PATIENT INSTRUCTIONS
MRI follow-up instructions    Today at your office visit, Dr. Woo recommended an MRI (magnetic resonance imaging) to evaluate your joint pain.  This requires a precertification process, which our office will do, and then we will contact you when it is approved and go over scheduling options.  We typically recommend these to be performed at [facility] or [facility].  If for some reason it is performed elsewhere please arrange to have that facility give you a disc with your images on it so Dr. Woo can review it at your follow-up visit.    When checking out today we recommend making an appointment to go over your results in approximately two weeks.  If your MRI is done sooner than that we would be happy to schedule you sooner to go over your results, just contact us at [phone] or through the [employer] portal to let us know your MRI is completed.  Seeing you in person for the results gives us the best opportunity to look at your images together and explain the diagnosis and treatment options to best help you.

## 2021-09-21 ENCOUNTER — TELEPHONE (OUTPATIENT)
Dept: ORTHOPEDIC SURGERY | Facility: CLINIC | Age: 66
End: 2021-09-21

## 2021-09-21 NOTE — TELEPHONE ENCOUNTER
I CALLED AND SPOKE TO PATIENT AND SHE HAS CANCELED HER APPOINTMENT FOR TOMORROW AT [facility].  I OFFERED HER TO CALL [facility] AND DISCUSS THEIR BORE UNIT SIZE.  SHE STATED SHE WOULD LET ME KNOW IF SHE DECIDES TO HAVE IMAGING AT ANOTHER FACILITY.

## 2021-09-21 NOTE — TELEPHONE ENCOUNTER
----- Message from Jannet Fierro sent at 9/21/2021  7:31 AM EDT -----  Regarding: Non-Urgent Medical Question  Contact: [phone]  Dr. Woo,  As a retired Radiologic Technologist I know about MRI.  The last time I had an MRI, c-spine, I was very confined. My shoulders are as wide as the bore and as I age claustrophobia is a factor.  I do not believe I can keep my MRI apt tomorrow.  If there is an open bore Mri unit in [facility] I might try.  My apologies.  Jannet

## 2021-09-22 ENCOUNTER — APPOINTMENT (OUTPATIENT)
Dept: MRI IMAGING | Facility: HOSPITAL | Age: 66
End: 2021-09-22

## 2021-09-23 ENCOUNTER — TELEPHONE (OUTPATIENT)
Dept: ORTHOPEDIC SURGERY | Facility: CLINIC | Age: 66
End: 2021-09-23

## 2021-09-23 NOTE — TELEPHONE ENCOUNTER
Provider: CHANTELLE  Caller: RODOLFO BRO  Relationship to Patient: PATIENT  Pharmacy: N/A  Phone Number: [phone]  Reason for Call: PATIENT WOULD LIKE MRI ORDER (RT SHLDR) SENT TO [facility]  PH: [phone]

## 2021-09-24 NOTE — TELEPHONE ENCOUNTER
I spoke to patient and she is no longer working so her insurance has changed to Medicare and [facility].  I received copy of cards from patient.  Order was faxed to [facility].  Patient was advised to call [facility] and then call back here to obtain Dr Woo return appointment for MRI results.

## 2021-10-18 ENCOUNTER — OFFICE VISIT (OUTPATIENT)
Dept: ORTHOPEDIC SURGERY | Facility: CLINIC | Age: 66
End: 2021-10-18

## 2021-10-18 VITALS
WEIGHT: 242 LBS | BODY MASS INDEX: 41.32 KG/M2 | DIASTOLIC BLOOD PRESSURE: 76 MMHG | SYSTOLIC BLOOD PRESSURE: 121 MMHG | HEART RATE: 82 BPM | HEIGHT: 64 IN

## 2021-10-18 DIAGNOSIS — M75.111 PARTIAL NONTRAUMATIC TEAR OF RIGHT ROTATOR CUFF: Primary | ICD-10-CM

## 2021-10-18 PROCEDURE — 99214 OFFICE O/P EST MOD 30 MIN: CPT | Performed by: ORTHOPAEDIC SURGERY

## 2021-10-18 NOTE — PROGRESS NOTES
"     Patient ID: Jannet Fierro is a 66 y.o. female.  Right shoulder pain  Jannet returns with continued right shoulder pain.  Also has significant numbness and tingling to the hand    Review of Systems:  Right shoulder pain  Right hand tingling      Objective:    /76   Pulse 82   Ht 162.6 cm (64\")   Wt 110 kg (242 lb)   LMP  (LMP Unknown)   BMI 41.54 kg/m²     Physical Examination:      Right shoulder demonstrates intact skin.  There is pain in the impingement area.  Passive elevation 160 abduction 130 external rotation 30 internal rotation S1.  She has pain and weakness on Speed, [place], supraspinatus testing.  Belly press and liftoff are 4/5.  Sensory and motor exam are intact all distributions. Radial pulse is palpable and capillary refill is less than two seconds to all digits    Imaging:   MRI demonstrates 50 to 75% tear of the supraspinatus    Assessment:    Right shoulder partial cuff tear  Right arm tingling    Plan:   Treatment options with the shoulder were discussed.  She has had extensive nonoperative management without resolution of her pain.  Further options would involve right shoulder arthroscopy with debridement versus cuff repair.  Risks and benefits, specifically risks of bleeding, scar, infection, fracture, stiffness, retear, nerve, tendon or artery damage, the need for further surgery, DVT, and loss of life or limb and rehab were discussed. All questions were answered and addressed.  For the hand tingling I discussed possible work-up of her neck versus EMG      Procedures          Disclaimer: Please note that areas of this note were completed with computer voice recognition software.  Quite often unanticipated grammatical, syntax, homophones, and other interpretive errors are inadvertently transcribed by the computer software. Please excuse any errors that have escaped final proofreading.  "

## 2021-11-16 ENCOUNTER — OFFICE VISIT (OUTPATIENT)
Dept: FAMILY MEDICINE CLINIC | Facility: CLINIC | Age: 66
End: 2021-11-16

## 2021-11-16 VITALS
WEIGHT: 233.8 LBS | DIASTOLIC BLOOD PRESSURE: 79 MMHG | TEMPERATURE: 98.6 F | SYSTOLIC BLOOD PRESSURE: 141 MMHG | HEART RATE: 85 BPM | OXYGEN SATURATION: 97 % | BODY MASS INDEX: 39.91 KG/M2 | HEIGHT: 64 IN

## 2021-11-16 DIAGNOSIS — R42 DIZZINESS: Primary | ICD-10-CM

## 2021-11-16 DIAGNOSIS — H61.23 BILATERAL IMPACTED CERUMEN: ICD-10-CM

## 2021-11-16 DIAGNOSIS — Z78.0 POST-MENOPAUSAL: ICD-10-CM

## 2021-11-16 PROCEDURE — 99213 OFFICE O/P EST LOW 20 MIN: CPT | Performed by: FAMILY MEDICINE

## 2021-11-16 NOTE — PROGRESS NOTES
Norma Fierro is a 66 y.o. female.     Dizziness  This is a recurrent problem. The current episode started more than 1 month ago. The problem occurs intermittently. The problem has been unchanged. Associated symptoms include fatigue. Pertinent negatives include no abdominal pain, coughing, fever, nausea, vertigo or visual change. She has tried nothing for the symptoms.        The following portions of the patient's history were reviewed and updated as appropriate: past medical history, past social history, past surgical history and problem list.    Review of Systems   Constitutional: Positive for fatigue. Negative for activity change, appetite change and fever.   HENT: Positive for hearing loss. Negative for ear discharge, ear pain, postnasal drip and sinus pressure.    Respiratory: Negative for cough and shortness of breath.    Gastrointestinal: Negative for abdominal pain and nausea.   Musculoskeletal:        Right shoulder pain   Neurological: Positive for dizziness and light-headedness. Negative for vertigo and headache.   Psychiatric/Behavioral: The patient is not nervous/anxious.        Objective   Physical Exam  Vitals reviewed.   Constitutional:       General: She is not in acute distress.     Appearance: She is well-developed.   HENT:      Right Ear: Ear canal and external ear normal. There is impacted cerumen.      Left Ear: Ear canal and external ear normal. There is impacted cerumen.   Eyes:      Conjunctiva/sclera: Conjunctivae normal.   Neck:      Thyroid: No thyromegaly.   Pulmonary:      Breath sounds: Normal breath sounds. No wheezing.   Musculoskeletal:      Cervical back: Normal range of motion and neck supple.   Neurological:      Mental Status: She is alert and oriented to person, place, and time.       Vitals:    11/16/21 1358   BP: 141/79   Pulse: 85   Temp: 98.6 °F (37 °C)   SpO2: 97%     No current outpatient medications on file prior to visit.     No current  facility-administered medications on file prior to visit.           Assessment/Plan   Problems Addressed this Visit        ENT    Bilateral impacted cerumen    Relevant Orders    Ambulatory Referral to ENT (Otolaryngology)       Genitourinary and Reproductive     Post-menopausal    Relevant Orders    DEXA Bone Density Axial       Symptoms and Signs    Dizziness - Primary    Relevant Orders    Ambulatory Referral to ENT (Otolaryngology)      Diagnoses       Codes Comments    Dizziness    -  Primary ICD-10-CM: R42  ICD-9-CM: 780.4     Bilateral impacted cerumen     ICD-10-CM: H61.23  ICD-9-CM: 380.4     Post-menopausal     ICD-10-CM: Z78.0  ICD-9-CM: V49.81

## 2021-11-18 ENCOUNTER — PATIENT ROUNDING (BHMG ONLY) (OUTPATIENT)
Dept: FAMILY MEDICINE CLINIC | Facility: CLINIC | Age: 66
End: 2021-11-18

## 2021-11-18 NOTE — PROGRESS NOTES
November 18, 2021    Hello, may I speak with Jannet Fierro?    My name is MARIN  I am  with [facility]  [address]  [place] [address].    Before we get started may I verify your date of birth? 1955    I am calling to officially welcome you to our practice and ask about your recent visit. Is this a good time to talk? yes    Tell me about your visit with us. What things went well?  NO QUESTIONS, OTHER THAN  HOW LONG REFERRALS TOOK 3-5 BUSINESS DAYS. POSSIBLY LONGER W/ COVID SITUATION.       We're always looking for ways to make our patients' experiences even better. Do you have recommendations on ways we may improve?  no    Overall were you satisfied with your first visit to our practice? yes       I appreciate you taking the time to speak with me today. Is there anything else I can do for you? no      Thank you, and have a great day.

## 2021-11-21 PROBLEM — H61.23 BILATERAL IMPACTED CERUMEN: Status: ACTIVE | Noted: 2021-11-21

## 2021-11-21 PROBLEM — Z78.0 POST-MENOPAUSAL: Status: ACTIVE | Noted: 2021-11-21

## 2021-12-08 ENCOUNTER — PREP FOR SURGERY (OUTPATIENT)
Dept: OTHER | Facility: HOSPITAL | Age: 66
End: 2021-12-08

## 2021-12-08 DIAGNOSIS — N17.1 ACUTE KIDNEY FAILURE WITH ACUTE CORTICAL NECROSIS (HCC): ICD-10-CM

## 2021-12-08 DIAGNOSIS — R79.1 ABNORMAL COAGULATION PROFILE: ICD-10-CM

## 2021-12-08 DIAGNOSIS — M75.111 PARTIAL NONTRAUMATIC TEAR OF RIGHT ROTATOR CUFF: Primary | ICD-10-CM

## 2021-12-20 ENCOUNTER — HOSPITAL ENCOUNTER (OUTPATIENT)
Dept: BONE DENSITY | Facility: HOSPITAL | Age: 66
Discharge: HOME | End: 2021-12-20
Admitting: FAMILY MEDICINE

## 2021-12-20 PROCEDURE — 77080 DXA BONE DENSITY AXIAL: CPT

## 2021-12-27 ENCOUNTER — LAB (OUTPATIENT)
Dept: LAB | Facility: HOSPITAL | Age: 66
End: 2021-12-27

## 2021-12-27 ENCOUNTER — HOSPITAL ENCOUNTER (OUTPATIENT)
Dept: CARDIOLOGY | Facility: HOSPITAL | Age: 66
Discharge: HOME | End: 2021-12-27

## 2021-12-27 DIAGNOSIS — R79.1 ABNORMAL COAGULATION PROFILE: ICD-10-CM

## 2021-12-27 DIAGNOSIS — N17.1 ACUTE KIDNEY FAILURE WITH ACUTE CORTICAL NECROSIS (HCC): ICD-10-CM

## 2021-12-27 DIAGNOSIS — M75.111 PARTIAL NONTRAUMATIC TEAR OF RIGHT ROTATOR CUFF: ICD-10-CM

## 2021-12-27 LAB
ANION GAP SERPL CALCULATED.3IONS-SCNC: 7.4 MMOL/L (ref 5–15)
APTT PPP: 26.6 SECONDS (ref 24–31)
BASOPHILS # BLD AUTO: 0.06 10*3/MM3 (ref 0–0.2)
BASOPHILS NFR BLD AUTO: 0.8 % (ref 0–1.5)
BUN SERPL-MCNC: 11 MG/DL (ref 8–23)
BUN/CREAT SERPL: 13.6 (ref 7–25)
CALCIUM SPEC-SCNC: 9.7 MG/DL (ref 8.6–10.5)
CHLORIDE SERPL-SCNC: 101 MMOL/L (ref 98–107)
CO2 SERPL-SCNC: 30.6 MMOL/L (ref 22–29)
CREAT SERPL-MCNC: 0.81 MG/DL (ref 0.57–1)
DEPRECATED RDW RBC AUTO: 43.6 FL (ref 37–54)
EOSINOPHIL # BLD AUTO: 0.13 10*3/MM3 (ref 0–0.4)
EOSINOPHIL NFR BLD AUTO: 1.7 % (ref 0.3–6.2)
ERYTHROCYTE [DISTWIDTH] IN BLOOD BY AUTOMATED COUNT: 13.2 % (ref 12.3–15.4)
GFR SERPL CREATININE-BSD FRML MDRD: 71 ML/MIN/1.73
GLUCOSE SERPL-MCNC: 78 MG/DL (ref 65–99)
HCT VFR BLD AUTO: 47.3 % (ref 34–46.6)
HGB BLD-MCNC: 15.6 G/DL (ref 12–15.9)
IMM GRANULOCYTES # BLD AUTO: 0.01 10*3/MM3 (ref 0–0.05)
IMM GRANULOCYTES NFR BLD AUTO: 0.1 % (ref 0–0.5)
INR PPP: <0.93 (ref 0.93–1.1)
LYMPHOCYTES # BLD AUTO: 1.7 10*3/MM3 (ref 0.7–3.1)
LYMPHOCYTES NFR BLD AUTO: 22 % (ref 19.6–45.3)
MCH RBC QN AUTO: 29.8 PG (ref 26.6–33)
MCHC RBC AUTO-ENTMCNC: 33 G/DL (ref 31.5–35.7)
MCV RBC AUTO: 90.4 FL (ref 79–97)
MONOCYTES # BLD AUTO: 0.62 10*3/MM3 (ref 0.1–0.9)
MONOCYTES NFR BLD AUTO: 8 % (ref 5–12)
NEUTROPHILS NFR BLD AUTO: 5.21 10*3/MM3 (ref 1.7–7)
NEUTROPHILS NFR BLD AUTO: 67.4 % (ref 42.7–76)
NRBC BLD AUTO-RTO: 0 /100 WBC (ref 0–0.2)
PLATELET # BLD AUTO: 213 10*3/MM3 (ref 140–450)
PMV BLD AUTO: 10.4 FL (ref 6–12)
POTASSIUM SERPL-SCNC: 4 MMOL/L (ref 3.5–5.2)
PROTHROMBIN TIME: 10.3 SECONDS (ref 9.6–11.7)
RBC # BLD AUTO: 5.23 10*6/MM3 (ref 3.77–5.28)
SODIUM SERPL-SCNC: 139 MMOL/L (ref 136–145)
WBC NRBC COR # BLD: 7.73 10*3/MM3 (ref 3.4–10.8)

## 2021-12-27 PROCEDURE — 85730 THROMBOPLASTIN TIME PARTIAL: CPT

## 2021-12-27 PROCEDURE — 93010 ELECTROCARDIOGRAM REPORT: CPT | Performed by: INTERNAL MEDICINE

## 2021-12-27 PROCEDURE — 80048 BASIC METABOLIC PNL TOTAL CA: CPT

## 2021-12-27 PROCEDURE — 85025 COMPLETE CBC W/AUTO DIFF WBC: CPT

## 2021-12-27 PROCEDURE — 36415 COLL VENOUS BLD VENIPUNCTURE: CPT

## 2021-12-27 PROCEDURE — 93005 ELECTROCARDIOGRAM TRACING: CPT | Performed by: ORTHOPAEDIC SURGERY

## 2021-12-27 PROCEDURE — 85610 PROTHROMBIN TIME: CPT

## 2021-12-27 RX ORDER — MULTIPLE VITAMINS W/ MINERALS TAB 9MG-400MCG
1 TAB ORAL DAILY
COMMUNITY

## 2021-12-27 RX ORDER — IBUPROFEN 400 MG/1
400 TABLET ORAL EVERY 6 HOURS PRN
COMMUNITY
End: 2022-01-05 | Stop reason: HOSPADM

## 2021-12-28 LAB — QT INTERVAL: 341 MS

## 2022-01-03 ENCOUNTER — LAB (OUTPATIENT)
Dept: LAB | Facility: HOSPITAL | Age: 67
End: 2022-01-03

## 2022-01-03 DIAGNOSIS — M75.111 PARTIAL NONTRAUMATIC TEAR OF RIGHT ROTATOR CUFF: ICD-10-CM

## 2022-01-03 LAB — SARS-COV-2 ORF1AB RESP QL NAA+PROBE: NOT DETECTED

## 2022-01-03 PROCEDURE — U0004 COV-19 TEST NON-CDC HGH THRU: HCPCS

## 2022-01-03 PROCEDURE — U0005 INFEC AGEN DETEC AMPLI PROBE: HCPCS

## 2022-01-03 PROCEDURE — C9803 HOPD COVID-19 SPEC COLLECT: HCPCS

## 2022-01-03 RX ORDER — NAPROXEN 500 MG/1
500 TABLET ORAL 2 TIMES DAILY WITH MEALS
Qty: 28 TABLET | Refills: 0 | Status: SHIPPED | OUTPATIENT
Start: 2022-01-03 | End: 2022-02-21

## 2022-01-03 RX ORDER — OXYCODONE HYDROCHLORIDE AND ACETAMINOPHEN 5; 325 MG/1; MG/1
1 TABLET ORAL EVERY 6 HOURS PRN
Qty: 28 TABLET | Refills: 0 | Status: SHIPPED | OUTPATIENT
Start: 2022-01-03 | End: 2022-02-21

## 2022-01-03 RX ORDER — CEPHALEXIN 500 MG/1
500 CAPSULE ORAL 4 TIMES DAILY
Qty: 4 CAPSULE | Refills: 0 | Status: SHIPPED | OUTPATIENT
Start: 2022-01-03 | End: 2022-01-04

## 2022-01-03 RX ORDER — PROMETHAZINE HYDROCHLORIDE 12.5 MG/1
12.5 TABLET ORAL EVERY 6 HOURS PRN
Qty: 21 TABLET | Refills: 1 | Status: SHIPPED | OUTPATIENT
Start: 2022-01-03 | End: 2022-02-21

## 2022-01-03 NOTE — H&P
"[facility]   HISTORY AND PHYSICAL    Patient Name: Jannet Fierro  : 1955  MRN: [id number]  Primary Care Physician:  Latoya Richard MD  Date of admission: (Not on file)    Subjective   Subjective     Chief Complaint: Right shoulder pain    This is a 66-year-old female here with right shoulder pain presented for shoulder arthroscopy possible cuff repair        Review of Systems   Cardiovascular: Negative for chest pain.   Musculoskeletal: Positive for arthralgias.        Personal History     Past Medical History:   Diagnosis Date   • Back pain    • Cervical radiculitis 2020   • Chronic kidney disease     stones   • Chronic midline low back pain without sciatica 2017   • Compression fracture of first lumbar vertebra (HCC)      history   • Hard to intubate     be careful pt states she is to tell she has a small mouth   • Mild single current episode of major depressive disorder (HCC) 2017   • Neck pain    • Sleep apnea     cpap       Past Surgical History:   Procedure Laterality Date   • ANKLE SURGERY Right     hardware   • ANTERIOR CERVICAL DISCECTOMY W/ FUSION N/A 3/23/2020    Procedure: Cervical 4 to cervical 5 and cervical 5 to cervical 6 anterior cervical discectomy, fusion and instrumentation;  Surgeon: Segundo Thomas MD;  Location: [facility] OR;  Service: Neurosurgery;  Laterality: N/A;   • CYSTOSCOPY, URETEROSCOPY, RETROGRADE PYELOGRAM, STENT INSERTION Left 9/15/2020    Procedure: CYSTOSCOPY URETEROSCOPY STENT INSERTION;  Surgeon: Hill Johnson MD;  Location: [facility] OR;  Service: Urology;  Laterality: Left;   • KIDNEY STONE SURGERY     • KNEE SURGERY Right     hardware    torn acl & tendons and ligaments with pin and \"bungee cord\"       Family History: family history includes Heart attack (age of onset: 71) in her mother; Hyperlipidemia in her mother; Hypertension in her father; Hypothyroidism in her mother; Skin cancer (age of onset: 91) in her father; Thyroid " disease (age of onset: 31) in her daughter. Otherwise pertinent FHx was reviewed and not pertinent to current issue.    Social History:  reports that she has never smoked. She has never used smokeless tobacco. She reports current alcohol use. She reports that she does not use drugs.    Home Medications:  ibuprofen and multivitamin with minerals    Allergies:  Allergies   Allergen Reactions   • Methocarbamol Itching       Objective    Objective     Vitals:        Right shoulder demonstrates intact skin.  There is pain in the impingement area.  Passive elevation 160 abduction 130 external rotation 30 internal rotation S1.  She has pain and weakness on Speed, [place], supraspinatus testing.  Belly press and liftoff are 4/5.  Sensory and motor exam are intact all distributions. Radial pulse is palpable and capillary refill is less than two seconds to all digits     Imaging:   MRI demonstrates 50 to 75% tear of the supraspinatus     Assessment:    Right shoulder partial cuff tear  Right arm tingling     Plan:   Treatment options with the shoulder were discussed.  She has had extensive nonoperative management without resolution of her pain.  Further options would involve right shoulder arthroscopy with debridement versus cuff repair.  Risks and benefits, specifically risks of bleeding, scar, infection, fracture, stiffness, retear, nerve, tendon or artery damage, the need for further surgery, DVT, and loss of life or limb and rehab were discussed. All questions were answered and addressed.  For the hand tingling I discussed possible work-up of her neck versus EMG    Maged Woo MD

## 2022-01-04 NOTE — PAT
Call to pt and LVM that pt has time change to surg in am.  Arrival time 0915.  Call to Dtr yesenia and mallika arrival time 0915

## 2022-01-05 ENCOUNTER — ANESTHESIA EVENT (OUTPATIENT)
Dept: PERIOP | Facility: HOSPITAL | Age: 67
End: 2022-01-05

## 2022-01-05 ENCOUNTER — HOSPITAL ENCOUNTER (OUTPATIENT)
Facility: HOSPITAL | Age: 67
Setting detail: HOSPITAL OUTPATIENT SURGERY
Discharge: HOME | End: 2022-01-05
Attending: ORTHOPAEDIC SURGERY | Admitting: ORTHOPAEDIC SURGERY

## 2022-01-05 ENCOUNTER — ANESTHESIA (OUTPATIENT)
Dept: PERIOP | Facility: HOSPITAL | Age: 67
End: 2022-01-05

## 2022-01-05 VITALS
OXYGEN SATURATION: 93 % | HEART RATE: 88 BPM | SYSTOLIC BLOOD PRESSURE: 115 MMHG | BODY MASS INDEX: 38.89 KG/M2 | RESPIRATION RATE: 16 BRPM | TEMPERATURE: 97.7 F | WEIGHT: 227.8 LBS | HEIGHT: 64 IN | DIASTOLIC BLOOD PRESSURE: 61 MMHG

## 2022-01-05 DIAGNOSIS — M75.111 PARTIAL NONTRAUMATIC TEAR OF RIGHT ROTATOR CUFF: Primary | ICD-10-CM

## 2022-01-05 PROCEDURE — C1713 ANCHOR/SCREW BN/BN,TIS/BN: HCPCS | Performed by: ORTHOPAEDIC SURGERY

## 2022-01-05 PROCEDURE — 29827 SHO ARTHRS SRG RT8TR CUF RPR: CPT | Performed by: ORTHOPAEDIC SURGERY

## 2022-01-05 PROCEDURE — C1889 IMPLANT/INSERT DEVICE, NOC: HCPCS | Performed by: ORTHOPAEDIC SURGERY

## 2022-01-05 PROCEDURE — 25010000002 ROPIVACAINE PER 1 MG: Performed by: ANESTHESIOLOGY

## 2022-01-05 PROCEDURE — 25010000002 KETOROLAC TROMETHAMINE PER 15 MG: Performed by: ORTHOPAEDIC SURGERY

## 2022-01-05 PROCEDURE — 25010000002 DEXAMETHASONE PER 1 MG: Performed by: ANESTHESIOLOGY

## 2022-01-05 PROCEDURE — 0 LIDOCAINE 1 % SOLUTION 20 ML VIAL: Performed by: ORTHOPAEDIC SURGERY

## 2022-01-05 PROCEDURE — 25010000002 EPINEPHRINE PER 0.1 MG: Performed by: ORTHOPAEDIC SURGERY

## 2022-01-05 PROCEDURE — 25010000002 FENTANYL CITRATE (PF) 50 MCG/ML SOLUTION: Performed by: ANESTHESIOLOGY

## 2022-01-05 PROCEDURE — 76942 ECHO GUIDE FOR BIOPSY: CPT | Performed by: ORTHOPAEDIC SURGERY

## 2022-01-05 PROCEDURE — 25010000002 FENTANYL CITRATE (PF) 50 MCG/ML SOLUTION: Performed by: ANESTHESIOLOGIST ASSISTANT

## 2022-01-05 PROCEDURE — 29827 SHO ARTHRS SRG RT8TR CUF RPR: CPT

## 2022-01-05 PROCEDURE — 25010000002 PROPOFOL 10 MG/ML EMULSION: Performed by: ANESTHESIOLOGIST ASSISTANT

## 2022-01-05 PROCEDURE — 25010000002 ONDANSETRON PER 1 MG: Performed by: ANESTHESIOLOGIST ASSISTANT

## 2022-01-05 PROCEDURE — 25010000002 MIDAZOLAM PER 1 MG: Performed by: ANESTHESIOLOGY

## 2022-01-05 DEVICE — 2MM HI-FI® TAPE BLUE
Type: IMPLANTABLE DEVICE | Site: SHOULDER | Status: FUNCTIONAL
Brand: HI-FI

## 2022-01-05 DEVICE — HI-FI® PASSING LOOP
Type: IMPLANTABLE DEVICE | Site: SHOULDER | Status: FUNCTIONAL
Brand: HI-FI

## 2022-01-05 DEVICE — 5.5 MM ARGO KNOTLESS ANCHOR
Type: IMPLANTABLE DEVICE | Site: SHOULDER | Status: FUNCTIONAL
Brand: ARGO KNOTLESS

## 2022-01-05 RX ORDER — MIDAZOLAM HYDROCHLORIDE 1 MG/ML
INJECTION INTRAMUSCULAR; INTRAVENOUS
Status: COMPLETED | OUTPATIENT
Start: 2022-01-05 | End: 2022-01-05

## 2022-01-05 RX ORDER — ONDANSETRON 2 MG/ML
4 INJECTION INTRAMUSCULAR; INTRAVENOUS ONCE AS NEEDED
Status: DISCONTINUED | OUTPATIENT
Start: 2022-01-05 | End: 2022-01-05 | Stop reason: HOSPADM

## 2022-01-05 RX ORDER — DEXAMETHASONE SODIUM PHOSPHATE 4 MG/ML
INJECTION, SOLUTION INTRA-ARTICULAR; INTRALESIONAL; INTRAMUSCULAR; INTRAVENOUS; SOFT TISSUE AS NEEDED
Status: DISCONTINUED | OUTPATIENT
Start: 2022-01-05 | End: 2022-01-05 | Stop reason: SURG

## 2022-01-05 RX ORDER — FENTANYL CITRATE 50 UG/ML
50 INJECTION, SOLUTION INTRAMUSCULAR; INTRAVENOUS
Status: DISCONTINUED | OUTPATIENT
Start: 2022-01-05 | End: 2022-01-05 | Stop reason: HOSPADM

## 2022-01-05 RX ORDER — FENTANYL CITRATE 50 UG/ML
25 INJECTION, SOLUTION INTRAMUSCULAR; INTRAVENOUS
Status: DISCONTINUED | OUTPATIENT
Start: 2022-01-05 | End: 2022-01-05 | Stop reason: HOSPADM

## 2022-01-05 RX ORDER — MEPERIDINE HYDROCHLORIDE 25 MG/ML
12.5 INJECTION INTRAMUSCULAR; INTRAVENOUS; SUBCUTANEOUS
Status: DISCONTINUED | OUTPATIENT
Start: 2022-01-05 | End: 2022-01-05 | Stop reason: HOSPADM

## 2022-01-05 RX ORDER — LIDOCAINE HYDROCHLORIDE 10 MG/ML
INJECTION, SOLUTION EPIDURAL; INFILTRATION; INTRACAUDAL; PERINEURAL AS NEEDED
Status: DISCONTINUED | OUTPATIENT
Start: 2022-01-05 | End: 2022-01-05 | Stop reason: SURG

## 2022-01-05 RX ORDER — ROPIVACAINE HYDROCHLORIDE 5 MG/ML
INJECTION, SOLUTION EPIDURAL; INFILTRATION; PERINEURAL
Status: COMPLETED | OUTPATIENT
Start: 2022-01-05 | End: 2022-01-05

## 2022-01-05 RX ORDER — ONDANSETRON 2 MG/ML
INJECTION INTRAMUSCULAR; INTRAVENOUS AS NEEDED
Status: DISCONTINUED | OUTPATIENT
Start: 2022-01-05 | End: 2022-01-05 | Stop reason: SURG

## 2022-01-05 RX ORDER — IPRATROPIUM BROMIDE AND ALBUTEROL SULFATE 2.5; .5 MG/3ML; MG/3ML
3 SOLUTION RESPIRATORY (INHALATION) ONCE AS NEEDED
Status: DISCONTINUED | OUTPATIENT
Start: 2022-01-05 | End: 2022-01-05 | Stop reason: HOSPADM

## 2022-01-05 RX ORDER — ROCURONIUM BROMIDE 10 MG/ML
INJECTION, SOLUTION INTRAVENOUS AS NEEDED
Status: DISCONTINUED | OUTPATIENT
Start: 2022-01-05 | End: 2022-01-05 | Stop reason: SURG

## 2022-01-05 RX ORDER — SODIUM CHLORIDE 0.9 % (FLUSH) 0.9 %
10 SYRINGE (ML) INJECTION AS NEEDED
Status: DISCONTINUED | OUTPATIENT
Start: 2022-01-05 | End: 2022-01-05 | Stop reason: HOSPADM

## 2022-01-05 RX ORDER — LABETALOL HYDROCHLORIDE 5 MG/ML
5 INJECTION, SOLUTION INTRAVENOUS
Status: DISCONTINUED | OUTPATIENT
Start: 2022-01-05 | End: 2022-01-05 | Stop reason: HOSPADM

## 2022-01-05 RX ORDER — FENTANYL CITRATE 50 UG/ML
INJECTION, SOLUTION INTRAMUSCULAR; INTRAVENOUS
Status: COMPLETED | OUTPATIENT
Start: 2022-01-05 | End: 2022-01-05

## 2022-01-05 RX ORDER — PROPOFOL 10 MG/ML
VIAL (ML) INTRAVENOUS AS NEEDED
Status: DISCONTINUED | OUTPATIENT
Start: 2022-01-05 | End: 2022-01-05 | Stop reason: SURG

## 2022-01-05 RX ORDER — PROMETHAZINE HYDROCHLORIDE 25 MG/1
25 TABLET ORAL ONCE AS NEEDED
Status: DISCONTINUED | OUTPATIENT
Start: 2022-01-05 | End: 2022-01-05 | Stop reason: HOSPADM

## 2022-01-05 RX ORDER — SODIUM CHLORIDE 9 MG/ML
500 INJECTION, SOLUTION INTRAVENOUS CONTINUOUS
Status: DISCONTINUED | OUTPATIENT
Start: 2022-01-05 | End: 2022-01-05 | Stop reason: HOSPADM

## 2022-01-05 RX ORDER — NALOXONE HCL 0.4 MG/ML
0.4 VIAL (ML) INJECTION AS NEEDED
Status: DISCONTINUED | OUTPATIENT
Start: 2022-01-05 | End: 2022-01-05 | Stop reason: HOSPADM

## 2022-01-05 RX ORDER — DEXAMETHASONE SODIUM PHOSPHATE 4 MG/ML
INJECTION, SOLUTION INTRA-ARTICULAR; INTRALESIONAL; INTRAMUSCULAR; INTRAVENOUS; SOFT TISSUE
Status: COMPLETED | OUTPATIENT
Start: 2022-01-05 | End: 2022-01-05

## 2022-01-05 RX ORDER — MORPHINE SULFATE 4 MG/ML
4 INJECTION, SOLUTION INTRAMUSCULAR; INTRAVENOUS
Status: DISCONTINUED | OUTPATIENT
Start: 2022-01-05 | End: 2022-01-05 | Stop reason: HOSPADM

## 2022-01-05 RX ORDER — HYDROCODONE BITARTRATE AND ACETAMINOPHEN 5; 325 MG/1; MG/1
1 TABLET ORAL ONCE AS NEEDED
Status: DISCONTINUED | OUTPATIENT
Start: 2022-01-05 | End: 2022-01-05 | Stop reason: HOSPADM

## 2022-01-05 RX ORDER — FLUMAZENIL 0.1 MG/ML
0.2 INJECTION INTRAVENOUS AS NEEDED
Status: DISCONTINUED | OUTPATIENT
Start: 2022-01-05 | End: 2022-01-05 | Stop reason: HOSPADM

## 2022-01-05 RX ORDER — MORPHINE SULFATE 4 MG/ML
2 INJECTION, SOLUTION INTRAMUSCULAR; INTRAVENOUS
Status: DISCONTINUED | OUTPATIENT
Start: 2022-01-05 | End: 2022-01-05 | Stop reason: HOSPADM

## 2022-01-05 RX ORDER — MORPHINE SULFATE 4 MG/ML
1 INJECTION, SOLUTION INTRAMUSCULAR; INTRAVENOUS
Status: DISCONTINUED | OUTPATIENT
Start: 2022-01-05 | End: 2022-01-05 | Stop reason: HOSPADM

## 2022-01-05 RX ADMIN — DEXAMETHASONE SODIUM PHOSPHATE 4 MG: 4 INJECTION, SOLUTION INTRA-ARTICULAR; INTRALESIONAL; INTRAMUSCULAR; INTRAVENOUS; SOFT TISSUE at 13:02

## 2022-01-05 RX ADMIN — FENTANYL CITRATE 50 MCG: 50 INJECTION, SOLUTION INTRAMUSCULAR; INTRAVENOUS at 14:25

## 2022-01-05 RX ADMIN — SODIUM CHLORIDE 500 ML: 9 INJECTION, SOLUTION INTRAVENOUS at 10:33

## 2022-01-05 RX ADMIN — SUGAMMADEX 200 MG: 100 INJECTION, SOLUTION INTRAVENOUS at 13:31

## 2022-01-05 RX ADMIN — PROPOFOL 200 MG: 10 INJECTION, EMULSION INTRAVENOUS at 12:35

## 2022-01-05 RX ADMIN — DEXAMETHASONE SODIUM PHOSPHATE 4 MG: 4 INJECTION, SOLUTION INTRAMUSCULAR; INTRAVENOUS at 11:30

## 2022-01-05 RX ADMIN — CEFAZOLIN SODIUM 2 G: 1 INJECTION, POWDER, FOR SOLUTION INTRAMUSCULAR; INTRAVENOUS at 12:50

## 2022-01-05 RX ADMIN — ROCURONIUM BROMIDE 15 MG: 10 INJECTION INTRAVENOUS at 13:21

## 2022-01-05 RX ADMIN — MIDAZOLAM 2 MG: 1 INJECTION INTRAMUSCULAR; INTRAVENOUS at 11:30

## 2022-01-05 RX ADMIN — FENTANYL CITRATE 50 MCG: 50 INJECTION, SOLUTION INTRAMUSCULAR; INTRAVENOUS at 14:19

## 2022-01-05 RX ADMIN — FENTANYL CITRATE 100 MCG: 50 INJECTION, SOLUTION INTRAMUSCULAR; INTRAVENOUS at 11:30

## 2022-01-05 RX ADMIN — ONDANSETRON 4 MG: 2 INJECTION INTRAMUSCULAR; INTRAVENOUS at 13:24

## 2022-01-05 RX ADMIN — ROPIVACAINE HYDROCHLORIDE 30 ML: 5 INJECTION EPIDURAL; INFILTRATION; PERINEURAL at 11:30

## 2022-01-05 RX ADMIN — LIDOCAINE HYDROCHLORIDE 50 MG: 10 INJECTION, SOLUTION EPIDURAL; INFILTRATION; INTRACAUDAL; PERINEURAL at 12:35

## 2022-01-05 RX ADMIN — ROCURONIUM BROMIDE 35 MG: 10 INJECTION INTRAVENOUS at 12:35

## 2022-01-05 NOTE — ANESTHESIA PROCEDURE NOTES
Peripheral Block    Pre-sedation assessment completed: 1/5/2022 11:25 AM    Patient location during procedure: pre-op  Start time: 1/5/2022 11:25 AM  Stop time: 1/5/2022 11:30 AM  Reason for block: at surgeon's request and post-op pain management  Performed by  Anesthesiologist: Danielito Wren MD  Preanesthetic Checklist  Completed: patient identified, IV checked, site marked, risks and benefits discussed, surgical consent, monitors and equipment checked, pre-op evaluation and timeout performed  Prep:  Pt Position: supine  Sterile barriers:cap, gloves, mask and washed/disinfected hands  Prep: ChloraPrep  Patient monitoring: blood pressure monitoring, continuous pulse oximetry and EKG  Procedure    Sedation: yes    Guidance:ultrasound guided    ULTRASOUND INTERPRETATION. Using ultrasound guidance a gauge needle was placed in close proximity to the brachial plexus nerve, at which point, under ultrasound guidance anesthetic was injected in the area of the nerve and spread of the anesthesia was seen on ultrasound in close proximity thereto.  There were no abnormalities seen on ultrasound; a digital image was taken; and the patient tolerated the procedure with no complications. Images:still images obtained, printed/placed on chart    Laterality:right  Block Type:supraclavicular  Injection Technique:single-shot  Needle Type:echogenic  Needle Gauge:20 G  Resistance on Injection: none  Sedation medications used: midazolam (VERSED) injection, 2 mg  fentaNYL citrate (PF) (SUBLIMAZE) injection, 100 mcg  Medications Used: dexamethasone (DECADRON) injection, 4 mg  ropivacaine (NAROPIN) 0.5 % injection, 30 mL  Med administered at 1/5/2022 11:30 AM      Post Assessment  Injection Assessment: negative aspiration for heme, no paresthesia on injection and incremental injection  Patient Tolerance:comfortable throughout block  Complications:no

## 2022-01-05 NOTE — OP NOTE
SHOULDER ARTHROSCOPY WITH ROTATOR CUFF REPAIR  Procedure Report    Patient Name:  Jannet Fierro  YOB: 1955    Date of Surgery:  1/5/2022     Indications: This is a 66 y.o. female with pain to the right shoulder.  Imaging demonstrated rotator cuff tear.Treatment options were discussed.  They desired to proceed with shoulder arthroscopy with rotator cuff repair after discussing the risks including bleeding, scarring, infection, stiffness, nerve damage, tendon damage, artery damage, continued pain, DVT, loss of life or limb, and a need for further surgery.      Pre-op Diagnosis:   Partial nontraumatic tear of right rotator cuff [M75.111]       Post-op Diagnosis:    Right shoulder 90% supraspinatus tear    Procedure/CPT® Codes:12618    Procedure(s):  SHOULDER ARTHROSCOPY WITH ROTATOR CUFF REPAIR    Assistant: Inez Muñiz    was responsible for performing the following activities: Retraction, Suction, Irrigation, Suturing, Closing and Placing Dressing and their skilled assistance was necessary for the success of this case.         Anesthesia: General with Block    IV fluids: See anesthesia record    Estimated Blood Loss: minimal    Implants:    Implant Name Type Inv. Item Serial No.  Lot No. LRB No. Used Action   SUT TPE HIFI NONABS BERTA 2MM - [id number] Implant SUT TPE HIFI NONABS BERTA 2MM  [employer] [id number] Right 1 Implanted   SUT LP NONABS CUFFLINK HIFI NMBR2 - [id number] Implant SUT LP NONABS CUFFLINK HIFI NMBR2  [employer] [id number] Right 1 Implanted   SUT/ANCH THOMPSON KNOTLSS 5.5MM - [id number] Implant SUT/ANCH THOMPSON KNOTLSS 5.5MM  [employer] ANNIE [id number] Right 1 Implanted         Complications: None    Specimens:none    Description of Procedure: The patient's operative site was marked.  Regional anesthesia was administered.  They were brought to the operating room and placed  on the operating room table.  General anesthesia was administered. Antibiotics were dosed.  A timeout was taken,  confirming the correct operative site and procedure.  Exam under anesthesia indicated full range of motion and no instability.  They were placed in a semilateral position.  An axillary roll and SCDs were placed.  The right shoulder was prepped and draped in the standard surgical fashion.  The shoulder was injected with local anesthetic and was placed into traction.  A posterior portal was created.  A camera was inserted.  The glenoid chondral surface was intact.  The humerus surface was intact.  The subscapularis was intact.  The biceps was intact.  The labrum was intact.  The undersurface of the cuff demonstrated tearing of the supraspinatus anteriorly. A shaver was inserted.  The labrum was probed and intact, supraspinatus debrided tear depth measured 7 mm x 1.5 cm in length tagged with PDS suture.  The biceps was pulled into the shoulder and found to be intact.  The axillary pouch was free of synovitis or loose bodies. The instruments were placed in the subacromial space.  A full-thickness bursectomy was performed.  The CA ligament was intact.  No impingement was noted.  An accessory portal was created.  Dorsal surface of the cuff demonstrated fraying of the supraspinatus footprint, tear was completed measured 1.5 cm in length.   Tuberosity skeletonized and a microfracture performed.   Fiber tape suture as well as a fiber link suture were used to create a mattress and ripstop configuration. These were secured to a anchor off the lateral edge of the tuberosity restoring the tendon to its footprint.  t.  The instruments were removed.  The wounds were closed with suture and Steri-Strips.    30 mg of Toradol and lidocaine were injected into the deltoid  and a sterile dressing was applied to the rest of the shoulder.  They were placed in a sling and taken to the recovery room.  There were no complications.  I was present for all portions.  All counts were correct.  Good capillary refill was noted to the  hand.      Maged Woo MD     Date: 1/5/2022  Time: 13:48 EST

## 2022-01-05 NOTE — ANESTHESIA PROCEDURE NOTES
Airway  Urgency: elective    Date/Time: 1/5/2022 12:38 PM  Airway not difficult    General Information and Staff    Patient location during procedure: OR  Anesthesiologist: Danielito Wren MD  CRNA: Radha Fofana CAA    Indications and Patient Condition  Indications for airway management: airway protection    Preoxygenated: yes  MILS maintained throughout  Mask difficulty assessment: 1 - vent by mask    Final Airway Details  Final airway type: endotracheal airway      Successful airway: ETT  Cuffed: yes   Successful intubation technique: direct laryngoscopy  Facilitating devices/methods: intubating stylet  Endotracheal tube insertion site: oral  Blade: Hugh  Blade size: 3  ETT size (mm): 7.0  Cormack-Lehane Classification: grade IIa - partial view of glottis  Placement verified by: chest auscultation and capnometry   Measured from: teeth  ETT/EBT  to teeth (cm): 22  Number of attempts at approach: 1  Assessment: lips, teeth, and gum same as pre-op and atraumatic intubation    Additional Comments  Planned Glidescope use per patient history. Glidescope malfunction. Gentle DL with MAC 3, minimal neck movement. Pt was a grade 2a view. ETT placed atraumatically on first attempt.

## 2022-01-05 NOTE — ANESTHESIA POSTPROCEDURE EVALUATION
Patient: Jannet Fierro    Procedure Summary     Date: 01/05/22 Room / Location: [facility] OR [address] [place] [address] MAIN OR    Anesthesia Start: 1230 Anesthesia Stop: 1350    Procedure: SHOULDER ARTHROSCOPY WITH ROTATOR CUFF REPAIR (Right Shoulder) Diagnosis:       Partial nontraumatic tear of right rotator cuff      (Partial nontraumatic tear of right rotator cuff [M75.111])    Surgeons: Maged Woo MD Provider: Danielito Wren MD    Anesthesia Type: general with block ASA Status: 3          Anesthesia Type: general with block    Vitals  Vitals Value Taken Time   /57 01/05/22 1443   Temp 97.7 °F (36.5 °C) 01/05/22 1440   Pulse 85 01/05/22 1446   Resp     SpO2 87 % 01/05/22 1446   Vitals shown include unvalidated device data.        Post Anesthesia Care and Evaluation    Patient location during evaluation: PACU  Patient participation: complete - patient participated  Level of consciousness: awake  Pain scale: See nurse's notes for pain score.  Pain management: adequate  Airway patency: patent  Anesthetic complications: No anesthetic complications  PONV Status: none  Cardiovascular status: acceptable  Respiratory status: acceptable  Hydration status: acceptable    Comments: Patient seen and examined postoperatively; vital signs stable; SpO2 greater than or equal to 90%; cardiopulmonary status stable; nausea/vomiting adequately controlled; pain adequately controlled; no apparent anesthesia complications; patient discharged from anesthesia care when discharge criteria were met

## 2022-01-05 NOTE — ANESTHESIA PREPROCEDURE EVALUATION
Anesthesia Evaluation     Patient summary reviewed and Nursing notes reviewed   history of anesthetic complications: difficult airway  NPO Solid Status: > 8 hours  NPO Liquid Status: > 8 hours           Airway   Mallampati: II  TM distance: >3 FB  Neck ROM: full  Possible difficult intubation  Dental - normal exam     Pulmonary - normal exam   (+) sleep apnea,   Cardiovascular - negative cardio ROS and normal exam        Neuro/Psych  (+) psychiatric history Depression,     GI/Hepatic/Renal/Endo    (+)   renal disease CRI,     Musculoskeletal (-) negative ROS    Abdominal  - normal exam    Bowel sounds: normal.   Substance History - negative use     OB/GYN negative ob/gyn ROS         Other                        Anesthesia Plan    ASA 3     general with block     intravenous induction     Anesthetic plan, all risks, benefits, and alternatives have been provided, discussed and informed consent has been obtained with: patient.    Plan discussed with CAA and CRNA.

## 2022-01-06 ENCOUNTER — OFFICE VISIT (OUTPATIENT)
Dept: ORTHOPEDIC SURGERY | Facility: CLINIC | Age: 67
End: 2022-01-06

## 2022-01-06 VITALS
WEIGHT: 227 LBS | BODY MASS INDEX: 38.76 KG/M2 | DIASTOLIC BLOOD PRESSURE: 72 MMHG | HEART RATE: 90 BPM | SYSTOLIC BLOOD PRESSURE: 118 MMHG | HEIGHT: 64 IN

## 2022-01-06 DIAGNOSIS — Z47.89 ORTHOPEDIC AFTERCARE: Primary | ICD-10-CM

## 2022-01-06 PROCEDURE — 99024 POSTOP FOLLOW-UP VISIT: CPT | Performed by: ORTHOPAEDIC SURGERY

## 2022-01-06 NOTE — PROGRESS NOTES
"     Patient ID: Jannet Fierro is a 66 y.o. female.  1/5/22 right shoulder arthroscopy supraspinatus repair  Pain controlled    Objective:    /72 (BP Location: Left arm, Patient Position: Sitting, Cuff Size: Large Adult)   Pulse 90   Ht 162.6 cm (64\")   Wt 103 kg (227 lb)   LMP  (LMP Unknown)   BMI 38.96 kg/m²     Physical Examination:  Wounds are well approximated without infection.  Sensory and motor exam are intact in all distributions. Radial pulse is palpable and capillary refill is less than two seconds to all digits.      Imaging:      Assessment:  Doing well after cuff repair    Plan:  Wounds were cleaned and redressed. Restrictions discussed.  Begin therapy and see me in 4 weeks.  Remove dressings in two weeks.    Procedures  "

## 2022-01-06 NOTE — PATIENT INSTRUCTIONS
Shoulder Arthroscopy: Post- Operative Visit Objectives    1) Review the operative findings, procedures and photos.  2) Make sure medications are effective and not causing problems.  a) Pain: Oxycodone or hydrocodone is for pain. You may take 1 tablet every 6 hours as necessary.  Some patients don’t require the use of these…in those circumstances just use Tylenol extra-strength 1 or 2 tablets every 4-6 hours.   b) Naproxen 500 mg. For pain and inflammation.  You should take 1 every twice a day.  Do not use Aleve, Ibuprofen, Motrin or Advil during this time.  If you have had any problems stop taking these medicines and please tell us!  c) Keflex (cephalexin). This is an antibiotic to be taken as a preventive medicine.  Take 1 pill every 6 hours for 1 day. If you have a penicillin allergy this will be replaced by other options.  3) Wound Care:  a) Today we will change your dressings and cover your wounds with a plastic covering called Tegaderm. This will allow you to shower immediately.  Remove the tegaderm and underlying dressings in two weeks then ok to get wet in a shower. No Baths or swimming until 4 weeks after surgery.  Keep a towel on the dressing while applying ice.  4) Exercises and Physical Therapy Remember the protocol is 3 phases:  a) Healing (6 wks)  Continue the use of the sling for 6 weeks; depending on procedure utilized this may be shorter. You can do gentle range of motion exercise of the elbow and wrist immediately with the arm at the side.  Formal physical therapy will usually start in two weeks.    b) Rehabilitative (6 wks) You begin a more aggressive phase of physical therapy at the 6 week matias. Light strengthening and a continued emphasis on protecting the repair are important at this stage.  c) Restorative (4 wks)  Back to your sports and job activities gradually   5) Follow Up appointments Schedule Follow up visits as directed usually in 4 weeks. Physical therapy will be ordered today and you  should receive a phone call or can schedule yourself if appropriate.  6) Notes  Make sure you have all necessary notes and documentation for school or work.  7) Issues: Remember our goal is to make this process smooth and easy if there is any thing you need please ask us or call back [phone]  8)

## 2022-01-24 ENCOUNTER — TREATMENT (OUTPATIENT)
Dept: PHYSICAL THERAPY | Facility: CLINIC | Age: 67
End: 2022-01-24

## 2022-01-24 DIAGNOSIS — Z98.890 S/P RIGHT ROTATOR CUFF REPAIR: Primary | ICD-10-CM

## 2022-01-24 PROCEDURE — 97161 PT EVAL LOW COMPLEX 20 MIN: CPT

## 2022-01-24 PROCEDURE — 97140 MANUAL THERAPY 1/> REGIONS: CPT

## 2022-01-24 NOTE — PROGRESS NOTES
Physical Therapy Initial Evaluation and Plan of Care    Patient: Jannet Fierro   : 1955  Diagnosis/ICD-10 Code:  S/P right rotator cuff repair [Z98.890]  Referring practitioner: Maged Woo, *  Date of Initial Visit: 2022  Today's Date: 2022  Patient seen for 1 sessions           Subjective Questionnaire: QuickDASH: 36% functional limitation      Subjective Evaluation    History of Present Illness  Onset date: Patient reported onset of shoulder pain and  impaired ROM.  Date of surgery: 2022    Subjective comment: Patient reported she experienced (R) cold hand and (R) shoulder pain and limited ROM for years   Patient Occupation: Retired from healthcare IT and radiology   Precautions and Work Restrictions: No strengthening/lifting at this time.  Sling wear for 4-6 weeks.  Surgeon's protocol providedQuality of life: good    Pain  Current pain ratin  At best pain ratin  At worst pain ratin  Location: posterior right  shoulder   Quality: dull ache and cramping  Relieving factors: medications and ice (Aspirin or Ibuprofen as needed.  No presciption  pain medication for 10 days)  Exacerbated by: using (R) hand to cut or chop; attempting to reach with right arm.  Progression: improved    Social Support  Lives in: one-story house (stairs or ramp entrance)  Lives with: alone    Hand dominance: right    Patient Goals  Patient goals for therapy: increased strength, decreased pain, increased motion, independence with ADLs/IADLs and return to sport/leisure activities  Patient goal: Pt wants to be able to bake, cook; household activities as cooking and cleaning; enjoy cards and games with family       PMHx: Past medical history and other relevant information reviewed via Epic/EMR    Objective          Palpation     Right Tenderness of the supraspinatus.     Tenderness     Right Shoulder  Tenderness in the AC joint.     Active Range of Motion   Left Shoulder   Flexion: 142 degrees    Abduction: 164 degrees   External rotation 45°: WFL  Internal rotation 45°: WFL    Left Wrist   Wrist flexion: WFL  Wrist extension: WFL    Right Wrist   Wrist flexion: WFL  Wrist extension: WFl    Passive Range of Motion   Left Shoulder   Normal passive range of motion    Right Shoulder   Flexion: 80 degrees   Abduction: 74 degrees   External rotation 45°: 8 degrees   Internal rotation 45°: 47 degrees     Strength/Myotome Testing     Left Shoulder   Normal muscle strength    Left Elbow   Flexion: 4  Extension: 4    Right Elbow   Flexion: 5  Extension: 5    Left Wrist/Hand   Wrist extension: 5  Wrist flexion: 5    Right Wrist/Hand   Wrist extension: 5  Wrist flexion: 5    Additional Strength Details  Right shoulder strength not assess at evaluation as per surgeon protocol guidelines          Assessment & Plan     Assessment  Impairments: abnormal or restricted ROM, activity intolerance, impaired physical strength, lacks appropriate home exercise program and pain with function  Functional Limitations: carrying objects, lifting, sleeping, pushing, uncomfortable because of pain, moving in bed, reaching behind back, reaching overhead and unable to perform repetitive tasks  Assessment details: Pt would benefit from skilled care for the listed deficits of the right shoulder s/p RCR. Patient presents with impaired (R) shoulder PROM, impaired (R) elbow strength.  (R) shoulder AROM and strength not assessed at this time as in accordance with surgeon's protocol guidelines.  Patient is currently limited in ability to perform self care ADLs and household tasks with use of (R) UE resulting in compensation via (L) UE.  Recommend skilled therapy services to address (R) UE impairments to maximize functional outcomes.           Barriers to therapy: Chronic neck and low back pain   Prognosis: good    Goals  Plan Goals: SHORT TERM GOALS: Time for goal achievement: 4 weeks  1. Patient to be compliant with their initial HEP.  2.  Patient Independent with AAROM shoulder ex. Pulley or cane.  3. Patient has improved PROM  Shoulder flex = 125; abd = 90; ER/IR = 75 deg for progression to next phase.      LONG TERM GOALS: Time for goal achievement: 2 months  1. Pt score < 20 % perceived disability on DASH.  2. Pt has functional AROM shoulder flexion/abduction without pain.  3. Pt has 5/5 right UE strength for chores around the house or work.  4. Pt reports she is ready for DC to [facility] for self management of right shoulder condition.    Plan  Therapy options: will be seen for skilled therapy services  Planned modality interventions: cryotherapy, TENS and electrical stimulation/Russian stimulation  Planned therapy interventions: flexibility, functional ROM exercises, home exercise program, joint mobilization, manual therapy, neuromuscular re-education, strengthening, stretching and therapeutic activities  Other planned therapy interventions: Therapeutic exercise  Frequency: 2x week  Duration in visits: 16  Duration in weeks: 8  Treatment plan discussed with: patient        History # of Personal Factors and/or Comorbidities: LOW (0)  Examination of Body System(s): # of elements: LOW (1-2)  Clinical Presentation: STABLE   Clinical Decision Making: LOW       Timed:         Manual Therapy:    10     mins  64060;     Therapeutic Exercise:    0     mins  03658;     Neuromuscular Candice:    0    mins  95493;    Therapeutic Activity:     0     mins  48290;     Gait Trainin     mins  64434;     Ultrasound:     0     mins  94536;    Ionto                               0    mins   10463  Self Care                       0     mins   82536  Aquatic                          0     mins 76607      Un-Timed:  Electrical Stimulation:    0     mins  19975 ( );  Dry Needling     0     mins self-pay  Traction     0     mins 83823  Low Eval     35     Mins  61336  Mod Eval     0     Mins  52898  High Eval                       0     Mins   09840  Re-Eval                           0    mins  44870        Timed Treatment:   10    mins   Total Treatment:     45    mins    PT SIGNATURE: Oliva Rosario PT   [place] License#: [id number]      Electronically signed by Oliva Rosario PT, 01/24/22, 3:28 PM EST      Medicare Initial Certification  Certification Period:  1/24/2022 thru 4/23/2022  I certify that the therapy services are furnished while this patient is under my care.  The services outlined above are required by this patient, and will be reviewed every 90 days.       Physician Signature:__________________________________________________    PHYSICIAN: Maged Woo MD      DATE:     Please sign and return via fax to [phone].. Thank you, [facility].

## 2022-01-26 ENCOUNTER — TELEPHONE (OUTPATIENT)
Dept: ORTHOPEDIC SURGERY | Facility: CLINIC | Age: 67
End: 2022-01-26

## 2022-01-26 NOTE — TELEPHONE ENCOUNTER
Oliva was advised at 1/6/2022 Appt the patient was advised to remove bandages 2 wks from surgery date.

## 2022-01-31 ENCOUNTER — TREATMENT (OUTPATIENT)
Dept: PHYSICAL THERAPY | Facility: CLINIC | Age: 67
End: 2022-01-31

## 2022-01-31 DIAGNOSIS — Z98.890 S/P RIGHT ROTATOR CUFF REPAIR: Primary | ICD-10-CM

## 2022-01-31 PROCEDURE — 97110 THERAPEUTIC EXERCISES: CPT

## 2022-01-31 PROCEDURE — G0283 ELEC STIM OTHER THAN WOUND: HCPCS

## 2022-01-31 PROCEDURE — 97140 MANUAL THERAPY 1/> REGIONS: CPT

## 2022-01-31 NOTE — PROGRESS NOTES
Physical Therapy Daily Progress Note     Diagnosis Plan   1. S/P right rotator cuff repair         VISIT#: 2    Subjective   Jannet Fierro reports: Patient reported she is doing well today.  Patient removed dressing prior to therapy session.      Objective     See Exercise, Manual, and Modality Logs for complete treatment.     Patient Education: Pt education on scapular retraction, bicep curl and tricep extension to perform at home     Assessment/Plan Patient denied elevated pain level from time of arrival to end of treatment.  Patient rated (R) shoulder pain level as 3/10.  IFC utilized for pain modulation .  Patient denied contratindicatios to application of e-stim      Progress per Plan of Care.  Will advance as appropriate and per MD protocol            Timed:         Manual Therapy:    20     mins  50390;     Therapeutic Exercise:    13     mins  84238;     Neuromuscular Candice:    0    mins  03721;    Therapeutic Activity:     0     mins  80472;     Gait Trainin     mins  71461;     Ultrasound:     0     mins  29586;    Ionto                               0    mins   24511  Self Care                       0     mins   20198  Canalith Repos               0    mins  4209  Aquatic                          0     mins 83466    Un-Timed:  Electrical Stimulation:    12    mins  13385 ( );  Dry Needling     0    mins self-pay  Traction     0     mins 49912  Low Eval     0     Mins  16080  Mod Eval     0     Mins  95465  High Eval                       0     Mins  52914  Re-Eval                           0    mins  46435    Timed Treatment:   33   mins   Total Treatment:     45   mins    Oliva Rosario, PT PT, DPT, [id number]

## 2022-02-02 ENCOUNTER — TREATMENT (OUTPATIENT)
Dept: PHYSICAL THERAPY | Facility: CLINIC | Age: 67
End: 2022-02-02

## 2022-02-02 DIAGNOSIS — Z98.890 S/P RIGHT ROTATOR CUFF REPAIR: Primary | ICD-10-CM

## 2022-02-02 PROCEDURE — G0283 ELEC STIM OTHER THAN WOUND: HCPCS

## 2022-02-02 PROCEDURE — 97110 THERAPEUTIC EXERCISES: CPT

## 2022-02-02 PROCEDURE — 97140 MANUAL THERAPY 1/> REGIONS: CPT

## 2022-02-02 NOTE — PROGRESS NOTES
Physical Therapy Daily Progress Note     Diagnosis Plan   1. S/P right rotator cuff repair         VISIT#: 3    Subjective   Jannet Fierro reports: (R) shoulder pain as 4/10 upon arrival to therapy.  Patient reported compliance with current HEP.       Objective     See Exercise, Manual, and Modality Logs for complete treatment.     Patient Education:    Assessment/Plan:  Patient reported soreness of arm and shoulder following lightly resisted wrist strengthening with UE supported.  TENs applied for pain modulation following  completion of this activity .  Recommend continued skilled therapy services to address deficits , advancing as per MD protocol to maximize functional (I).        Progress per Plan of Care            Timed:         Manual Therapy:    20     mins  97296;     Therapeutic Exercise:    10     mins  48000;     Neuromuscular Candice:    0    mins  87279;    Therapeutic Activity:     0     mins  89872;     Gait Trainin     mins  39535;     Ultrasound:     0     mins  30596;    Ionto                               0    mins   81166  Self Care                       0     mins   25007  Canalith Repos               0    mins  4209  Aquatic                          0     mins 79238    Un-Timed:  Electrical Stimulation:    15     mins  11140 ( );  Dry Needling     0     mins self-pay  Traction     0     mins 42029  Low Eval     0     Mins  34599  Mod Eval     0     Mins  64120  High Eval                       0     Mins  50472  Re-Eval                           00    mins  98037    Timed Treatment:   30   mins   Total Treatment:     45   mins    Oliva Rosario, PT PT, DPT, [id number]

## 2022-02-08 ENCOUNTER — TREATMENT (OUTPATIENT)
Dept: PHYSICAL THERAPY | Facility: CLINIC | Age: 67
End: 2022-02-08

## 2022-02-08 DIAGNOSIS — Z98.890 S/P RIGHT ROTATOR CUFF REPAIR: Primary | ICD-10-CM

## 2022-02-08 PROCEDURE — G0283 ELEC STIM OTHER THAN WOUND: HCPCS

## 2022-02-08 PROCEDURE — 97140 MANUAL THERAPY 1/> REGIONS: CPT

## 2022-02-08 PROCEDURE — 97110 THERAPEUTIC EXERCISES: CPT

## 2022-02-08 NOTE — PROGRESS NOTES
Physical Therapy Daily Progress Note     Diagnosis Plan   1. S/P right rotator cuff repair         VISIT#: 4    Subjective   Jannet Fierro reports : she has been experiencing constant pain and soreness at (R) shoulder and arm       Objective     See Exercise, Manual, and Modality Logs for complete treatment.     Patient Education:    Assessment/Plan :  Patient demonstrates guarding during (R) shoulder PROM, requiring cueing for relaxation.  Added AAROM activities including pulleys and ball roll out this date.  Patient c/o fatigue/soreness.  IFC utilized at end of activity for pain modulation.  Recommend continued skilled therapy services to address remaining deficits and advance per surgeon's protocol guidelines.       Progress per Plan of Care            Timed:         Manual Therapy:    20     mins  86302;     Therapeutic Exercise:    10     mins  57928;     Neuromuscular Candice:    0    mins  41950;    Therapeutic Activity:     0     mins  98834;     Gait Trainin     mins  58455;     Ultrasound:     0     mins  31537;    Ionto                               0    mins   66644  Self Care                       0     mins   53372  Canalith Repos               0    mins  4209  Aquatic                          0     mins 34725    Un-Timed:  Electrical Stimulation:    15     mins  74364 ( );  Dry Needling     0     mins self-pay  Traction     0     mins 40527  Low Eval     0     Mins  76015  Mod Eval     0     Mins  79361  High Eval                       0     Mins  58058  Re-Eval                           0    mins  37441    Timed Treatment:   30   mins   Total Treatment:     45   mins    Oliva Rosario, PT PT, DPT, [id number]

## 2022-02-10 ENCOUNTER — TREATMENT (OUTPATIENT)
Dept: PHYSICAL THERAPY | Facility: CLINIC | Age: 67
End: 2022-02-10

## 2022-02-10 DIAGNOSIS — Z98.890 S/P RIGHT ROTATOR CUFF REPAIR: Primary | ICD-10-CM

## 2022-02-10 PROCEDURE — G0283 ELEC STIM OTHER THAN WOUND: HCPCS

## 2022-02-10 PROCEDURE — 97140 MANUAL THERAPY 1/> REGIONS: CPT

## 2022-02-10 PROCEDURE — 97110 THERAPEUTIC EXERCISES: CPT

## 2022-02-10 NOTE — PROGRESS NOTES
Physical Therapy Daily Progress Note     Diagnosis Plan   1. S/P right rotator cuff repair         VISIT#: 5    Subjective   Jannet Fierro reports: shoulder is feeling a little bit better    Objective     See Exercise, Manual, and Modality Logs for complete treatment.     Patient Education:Verbal cueing and visual demo for improved relaxation and technique during pendulum    Assessment/Plan  Patient tolerated treatment session well with cueing for relaxation. Patient reported compliance with current HEP.      Progress per Plan of Care.  Will advance activities as per MD protocol guidelines.             Timed:         Manual Therapy:    20     mins  19291;     Therapeutic Exercise:    10     mins  44894;     Neuromuscular Candice:    0    mins  37311;    Therapeutic Activity:     0     mins  27209;     Gait Trainin     mins  35751;     Ultrasound:     0     mins  91023;    Ionto                               0    mins   33703  Self Care                       0     mins   38060  Canalith Repos               0    mins  4209  Aquatic                          0     mins 35297    Un-Timed:  Electrical Stimulation:    15     mins  45308 ( );  Dry Needling     0     mins self-pay  Traction     0     mins 88216  Low Eval     0     Mins  40075  Mod Eval     0     Mins  89420  High Eval                       0     Mins  28962  Re-Eval                           0    mins  75517    Timed Treatment:   30   mins   Total Treatment:     45   mins    Oliva Rosario, PT PT, DPT, [id number]

## 2022-02-15 ENCOUNTER — TREATMENT (OUTPATIENT)
Dept: PHYSICAL THERAPY | Facility: CLINIC | Age: 67
End: 2022-02-15

## 2022-02-15 DIAGNOSIS — Z98.890 S/P RIGHT ROTATOR CUFF REPAIR: Primary | ICD-10-CM

## 2022-02-15 PROCEDURE — 97140 MANUAL THERAPY 1/> REGIONS: CPT

## 2022-02-15 PROCEDURE — 97110 THERAPEUTIC EXERCISES: CPT

## 2022-02-15 NOTE — PROGRESS NOTES
Physical Therapy Daily Progress Note     Diagnosis Plan   1. S/P right rotator cuff repair         VISIT#: 6    Subjective   Jannet Fierro reports:current pain level at (R) shoulder as 2-3/10      Objective     See Exercise, Manual, and Modality Logs for complete treatment.     Patient Education: Therapist educated pt on set up of home TENS unit she recently ordered and brought with her to therapy.     Assessment/Plan:  Patient reported sensation of instability of (R) shoulder.  Isometric shoulder strengthening is unable to be initiated at this time per surgeon's protocol.  Patient displays improved PROM flexion .  Recommend continued therapy to address remaining deficits to maximize functional (I) with progression as per MD protocol guidelines .      Progress per Plan of Care            Timed:         Manual Therapy:   25     mins  00441;     Therapeutic Exercise:    15     mins  86627;     Neuromuscular Candice:    0    mins  80635;    Therapeutic Activity:     0     mins  97199;     Gait Trainin     mins  78670;     Ultrasound:     0     mins  36241;    Ionto                               0    mins   98038  Self Care                       0     mins   60047  Canalith Repos               0    mins  4209  Aquatic                          0     mins 72047    Un-Timed:  Electrical Stimulation:    0     mins  10260 ( );  Dry Needling     0     mins self-pay  Traction     0     mins 02757  Low Eval     0     Mins  42216  Mod Eval     0     Mins  65291  High Eval                       0     Mins  40231  Re-Eval                           0    mins  38808    Timed Treatment:   30   mins   Total Treatment:     45   mins    Oliva Rosario, PT PT, DPT, [id number]

## 2022-02-17 ENCOUNTER — TREATMENT (OUTPATIENT)
Dept: PHYSICAL THERAPY | Facility: CLINIC | Age: 67
End: 2022-02-17

## 2022-02-17 DIAGNOSIS — Z98.890 S/P RIGHT ROTATOR CUFF REPAIR: Primary | ICD-10-CM

## 2022-02-17 PROCEDURE — 97140 MANUAL THERAPY 1/> REGIONS: CPT

## 2022-02-17 PROCEDURE — 97110 THERAPEUTIC EXERCISES: CPT

## 2022-02-17 NOTE — PROGRESS NOTES
Physical Therapy Daily Progress Note     Diagnosis Plan   1. S/P right rotator cuff repair         VISIT#: 7    Subjective   Jannet Kitchen reports: increased (R) shoulder soreness and pain this date.  Patient reported rainy/damp weather may be contributing.      Objective     See Exercise, Manual, and Modality Logs for complete treatment.     Patient Education: Verbal cueing to avoid shoulder hiking/elevation.  Therapist also instructed patient to use home TENs unit following therapy session this date.  Patient verbalized understanding.     Assessment/Plan:  Patient demonstrates elevation position of (R) shoulder with guarding via upper trap.  STM and manual stretching utilized this date to address.  Patient encouraged to perform neck ROM and stretching of neck musculature at home .       Progress per Plan of Care            Timed:         Manual Therapy:    30     mins  67806;     Therapeutic Exercise:    15     mins  47511;     Neuromuscular Candice:    0    mins  05099;    Therapeutic Activity:     0     mins  95263;     Gait Trainin     mins  77849;     Ultrasound:     0     mins  62967;    Ionto                               0    mins   98996  Self Care                       0     mins   43340  Canalith Repos               0    mins  4209  Aquatic                          0     mins 45702    Un-Timed:  Electrical Stimulation:    0     mins  48332 ( );  Dry Needling     0     mins self-pay  Traction     0     mins 48935  Low Eval     0     Mins  35906  Mod Eval     0     Mins  72389  High Eval                       0     Mins  76258  Re-Eval                           0    mins  69012    Timed Treatment:   45   mins   Total Treatment:     45   mins    Oliva Rosario, PT PT, DPT, [id number]

## 2022-02-21 ENCOUNTER — OFFICE VISIT (OUTPATIENT)
Dept: ORTHOPEDIC SURGERY | Facility: CLINIC | Age: 67
End: 2022-02-21

## 2022-02-21 VITALS
DIASTOLIC BLOOD PRESSURE: 74 MMHG | HEART RATE: 93 BPM | HEIGHT: 64 IN | SYSTOLIC BLOOD PRESSURE: 124 MMHG | BODY MASS INDEX: 38.76 KG/M2 | WEIGHT: 227 LBS

## 2022-02-21 DIAGNOSIS — Z47.89 ORTHOPEDIC AFTERCARE: Primary | ICD-10-CM

## 2022-02-21 PROCEDURE — 99024 POSTOP FOLLOW-UP VISIT: CPT | Performed by: ORTHOPAEDIC SURGERY

## 2022-02-21 NOTE — PROGRESS NOTES
Patient ID: Jannet Fierro is a 66 y.o. female.  1/5/22 right shoulder arthroscopy supraspinatus repair  Pain controlled      Objective:    LMP  (LMP Unknown)     Physical Examination:    Incisions are healed passive elevation 130 abduction 120 external rotation 30    Imaging:      Assessment:  Doing well after cuff repair    Plan:  Restrictions discussed. Continue physical therapy. See me in 8 weeks. Sling for 2 weeks.    Procedures

## 2022-02-22 ENCOUNTER — TREATMENT (OUTPATIENT)
Dept: PHYSICAL THERAPY | Facility: CLINIC | Age: 67
End: 2022-02-22

## 2022-02-22 DIAGNOSIS — Z98.890 S/P RIGHT ROTATOR CUFF REPAIR: Primary | ICD-10-CM

## 2022-02-22 PROCEDURE — 97110 THERAPEUTIC EXERCISES: CPT

## 2022-02-22 PROCEDURE — 97140 MANUAL THERAPY 1/> REGIONS: CPT

## 2022-02-22 NOTE — PROGRESS NOTES
Physical Therapy Daily Progress Note     Diagnosis Plan   1. S/P right rotator cuff repair         VISIT#: 8    Subjective   Jannet Abdiarvind reports: (R) shoulder is stiff and sore today which patient attributes to the weather      Objective     See Exercise, Manual, and Modality Logs for complete treatment.     Patient Education:    Assessment/Plan:  Pt reported fatigue at conclusion of treatment.  Per patient, surgeon stated she is to continue to per protocol and to wait to perform  AROM and strengthening until appropriate date per guidelines.  Patient is using TENs unit at home for pain control      Progress per Plan of Care            Timed:         Manual Therapy:    15     mins  32156;     Therapeutic Exercise:    30     mins  47007;     Neuromuscular Candice:    0    mins  15674;    Therapeutic Activity:     0     mins  72742;     Gait Trainin     mins  50801;     Ultrasound:     0     mins  40730;    Ionto                               0    mins   59378  Self Care                       0     mins   50838  Canalith Repos               0    mins  4209  Aquatic                          0     mins 72978    Un-Timed:  Electrical Stimulation:    0     mins  23244 (MC );  Dry Needling     0     mins self-pay  Traction     0     mins 49538  Low Eval     0     Mins  39365  Mod Eval     0     Mins  44595  High Eval                       0     Mins  28875  Re-Eval                           0    mins  70045    Timed Treatment:   45   mins   Total Treatment:     45   mins    Oliva Rosario, PT PT, DPT, [id number]

## 2022-02-24 ENCOUNTER — TREATMENT (OUTPATIENT)
Dept: PHYSICAL THERAPY | Facility: CLINIC | Age: 67
End: 2022-02-24

## 2022-02-24 DIAGNOSIS — Z98.890 S/P RIGHT ROTATOR CUFF REPAIR: Primary | ICD-10-CM

## 2022-02-24 PROCEDURE — 97140 MANUAL THERAPY 1/> REGIONS: CPT

## 2022-02-24 PROCEDURE — 97110 THERAPEUTIC EXERCISES: CPT

## 2022-02-24 NOTE — PROGRESS NOTES
Physical Therapy  Progress Note/Reasessment      Patient: Jannet Fierro   : 1955  Diagnosis/ICD-10 Code:  S/P right rotator cuff repair [Z98.890]  Referring practitioner: Maged Woo, *  Date of Initial Visit: Type: THERAPY  Noted: 2022  Today's Date: 2022  Patient seen for 9 sessions      Subjective:   Visit Diagnosis:    ICD-10-CM ICD-9-CM   1. S/P right rotator cuff repair  Z98.890 V45.89       Jannet Fierro reports: she had difficulty sleeping last evening due to pain at upper trap  Subjective Questionnaire: QuickDASH: raw score 40; indicates 65.9% impairment  Clinical Progress: improved  Home Program Compliance: Yes  Treatment has included: therapeutic exercise; manual therapy; e-stim; TENS    Subjective   Pain  Current pain ratin  At best pain ratin  At worst pain ratin  Location: Right upper and anterior shoulder  Objective   Right Shoulder   Flexion:115 degrees   Abduction: 70degrees   External rotation 45°: 34 degrees   Assessment/Plan     Goals  Plan Goals: SHORT TERM GOALS: Time for goal achievement: 4 weeks  1. Patient to be compliant with their initial HEP.: MET  2. Patient Independent with AAROM shoulder ex. Pulley or cane.:  MET  3. Patient has improved PROM  Shoulder flex = 125; abd = 90; ER/IR = 75 deg for progression to next phase; PROGRESSING, with exception of abd.      LONG TERM GOALS: Time for goal achievement: 2 months  1. Pt score < 20 % perceived disability on DASH.: INCONSISTENT, may be related to pt's comprehension of questionnaire at initial evaluation  2. Pt has functional AROM shoulder flexion/abduction without pain.: NOT ASSESSED- AROM not assessed as per protocol guidelines.   3. Pt has 5/5 right UE strength for chores around the house or work.: NOT ASSESSED - strength not initiated to date as per protocol guidelines.   4. Pt reports she is ready for DC to Independent [facility] for self management of right shoulder condition.;  PROGRESSING    Progress toward previous goals: Partially Met    Goals  Short-term goals (STG): 2/3  Long-term goals (LTG): 0/4      Recommendations: Continue as planned  Timeframe: 6 weeks  Prognosis to achieve goals: good    Timed:         Manual Therapy:    30     mins  00565;     Therapeutic Exercise:    15     mins  95646;     Neuromuscular Candice:    0    mins  92354;    Therapeutic Activity:     0     mins  14489;     Gait Trainin     mins  92901;     Ultrasound:     0     mins  56956;    Ionto                               0    mins   72570  Self Care                       0     mins   06797  Aquatic                          0     mins 13157      Un-Timed:  Electrical Stimulation:    0     mins  98790 ( );  Dry Needling     0     mins self-pay  Traction     0     mins 16298  Low Eval     0     Mins  90678  Mod Eval     0     Mins  82820  High Eval                       0     Mins  08839  Re-Eval                           0    mins  14543      Timed Treatment:   45   mins   Total Treatment:     45   mins      PT Signature: Oliva Rosario PT  [place] License #: [id number]

## 2022-03-01 ENCOUNTER — TREATMENT (OUTPATIENT)
Dept: PHYSICAL THERAPY | Facility: CLINIC | Age: 67
End: 2022-03-01

## 2022-03-01 DIAGNOSIS — Z98.890 S/P RIGHT ROTATOR CUFF REPAIR: Primary | ICD-10-CM

## 2022-03-01 PROCEDURE — 97140 MANUAL THERAPY 1/> REGIONS: CPT

## 2022-03-01 PROCEDURE — 97110 THERAPEUTIC EXERCISES: CPT

## 2022-03-01 NOTE — PROGRESS NOTES
Physical Therapy Daily Progress Note     Diagnosis Plan   1. S/P right rotator cuff repair         VISIT#: 10    Subjective   Jannet Fierro reports: current pain level as 2/10 at right shoulder.  Reported shoulder is slowly improving.     Objective     See Exercise, Manual, and Modality Logs for complete treatment.     Patient Education:    Assessment/Plan :  Patient demonstrates impaired (R) scapular strength as evidenced by limited scapular retraction .  Patient displays improved tissue mobility at (R) UT during stretching and STM; however, remains limited versus (L) side.       Progress per Plan of Care            Timed:         Manual Therapy:    20     mins  85825;     Therapeutic Exercise:    25     mins  02995;     Neuromuscular Candice:    0    mins  56133;    Therapeutic Activity:     0     mins  15496;     Gait Trainin     mins  89491;     Ultrasound:     0     mins  51118;    Ionto                               0    mins   59335  Self Care                       0     mins   99885  Canalith Repos               0    mins  4209  Aquatic                          0     mins 02867    Un-Timed:  Electrical Stimulation:    0     mins  14144 (MC );  Dry Needling     0     mins self-pay  Traction     0     mins 14705  Low Eval     0     Mins  79332  Mod Eval     0     Mins  44792  High Eval                       0     Mins  86000  Re-Eval                           0    mins  81154    Timed Treatment:   45   mins   Total Treatment:     45   mins    Oliva Rosario, PT PT, DPT, [id number]

## 2022-03-03 ENCOUNTER — TREATMENT (OUTPATIENT)
Dept: PHYSICAL THERAPY | Facility: CLINIC | Age: 67
End: 2022-03-03

## 2022-03-03 DIAGNOSIS — Z98.890 S/P RIGHT ROTATOR CUFF REPAIR: Primary | ICD-10-CM

## 2022-03-03 PROCEDURE — 97110 THERAPEUTIC EXERCISES: CPT

## 2022-03-03 PROCEDURE — 97140 MANUAL THERAPY 1/> REGIONS: CPT

## 2022-03-03 NOTE — PROGRESS NOTES
Physical Therapy Daily Progress Note     Diagnosis Plan   1. S/P right rotator cuff repair         VISIT#: 11    Subjective   Jannet Fierro reports: right shoulder pain rated as 4/10. Patient reported she had difficulty sleeping on Tuesday night secondary to pain and soreness.  Patient is using ice, TENs and Ibuprofen at home for pain .    Objective     See Exercise, Manual, and Modality Logs for complete treatment.     Patient Education: Verbal cueing to avoid shoulder hiking and compensatory strategies.    Assessment/Plan:  Added ER stretching with hand supported on wall, pt reported significant stretching and fatigue following activity.  Will advance activities as per surgeon's protocol       Progress per Plan of Care       ADDENDUM: time error correction.  Removed un-timed duration as these were entered in error.      Timed:         Manual Therapy:    15     mins  62972;     Therapeutic Exercise:    30     mins  45007;     Neuromuscular Candice:    0    mins  82501;    Therapeutic Activity:     0     mins  88637;     Gait Trainin     mins  40827;     Ultrasound:     0     mins  94072;    Ionto                               0    mins   84033  Self Care                       0     mins   61696  Canalith Repos               0    mins  4209  Aquatic                          0     mins 11024    Un-Timed:  Electrical Stimulation:    0     mins  64134 ( );  Dry Needling     0     mins self-pay  Traction     0     mins 23706  Low Eval     0     Mins  97565  Mod Eval     0     Mins  62139  High Eval                       0     Mins  89200  Re-Eval                           0    mins  24693    Timed Treatment:   45   mins   Total Treatment:     45   mins    Oliva Rosario, PT PT, DPT, [id number]

## 2022-03-08 ENCOUNTER — TREATMENT (OUTPATIENT)
Dept: PHYSICAL THERAPY | Facility: CLINIC | Age: 67
End: 2022-03-08

## 2022-03-08 DIAGNOSIS — Z98.890 S/P RIGHT ROTATOR CUFF REPAIR: Primary | ICD-10-CM

## 2022-03-08 PROCEDURE — 97110 THERAPEUTIC EXERCISES: CPT

## 2022-03-08 PROCEDURE — 97140 MANUAL THERAPY 1/> REGIONS: CPT

## 2022-03-08 NOTE — PROGRESS NOTES
Physical Therapy Daily Progress Note     Diagnosis Plan   1. S/P right rotator cuff repair         VISIT#: 12    Subjective   Jannet Fierro reports: she is doing ok.  Rated current pain level at (R) shoulder as 4/10.     Objective     See Exercise, Manual, and Modality Logs for complete treatment.     Patient Education:    Assessment/Plan:  Added isometric strengthening for shoulder and lower trapezius as per MD protocol guidelines .  Patient reported muscular fatigue at conclusion of treatment and slight elevation in pain to 5/10 from 4/10 at beginning of treatment.  Verbal and tactile cueing provided to limit compensatory strategies such as hiking shoulder.       Progress per Plan of Care and Progress strengthening /stabilization /functional activity            Timed:         Manual Therapy:    15     mins  02134;     Therapeutic Exercise:    30     mins  89640;     Neuromuscular Candice:    0    mins  29277;    Therapeutic Activity:     0     mins  64333;     Gait Trainin     mins  58588;     Ultrasound:     0     mins  36912;    Ionto                               0    mins   94501  Self Care                       0     mins   11317  Canalith Repos               0    mins  4209  Aquatic                          0     mins 81006    Un-Timed:  Electrical Stimulation:    0     mins  41795 ( );  Dry Needling     0     mins self-pay  Traction     0     mins 93634  Low Eval     0     Mins  06881  Mod Eval     0     Mins  94169  High Eval                       0     Mins  51701  Re-Eval                           0    mins  43827    Timed Treatment:   45   mins   Total Treatment:     45   mins    Oliva Rosario, PT PT, DPT, [id number]

## 2022-03-10 ENCOUNTER — TREATMENT (OUTPATIENT)
Dept: PHYSICAL THERAPY | Facility: CLINIC | Age: 67
End: 2022-03-10

## 2022-03-10 DIAGNOSIS — Z98.890 S/P RIGHT ROTATOR CUFF REPAIR: Primary | ICD-10-CM

## 2022-03-10 PROCEDURE — 97140 MANUAL THERAPY 1/> REGIONS: CPT

## 2022-03-10 PROCEDURE — 97110 THERAPEUTIC EXERCISES: CPT

## 2022-03-10 NOTE — PROGRESS NOTES
Physical Therapy Daily Progress Note     Diagnosis Plan   1. S/P right rotator cuff repair         VISIT#: 13    Subjective   Jannet Fierro reports: pain as 4/10 at (R) shoulder .  Pt reported she experienced muscular soreness following previous session with additional of isometric strengthening.     Objective     See Exercise, Manual, and Modality Logs for complete treatment.     Patient Education:  Reviewed proper positioning and to avoid compensatory strategies such as shoulder elevation/hiking.  Pt demonstrated improved technique with reminders.     Assessment/Plan:  Patient continues to feel restricted in all planes of motion. Limited in PROM in all planes. Patient reported she suffered with impaired motion of (R) shoulder for many years but feels function is slowly improving. Recommend continued therapy services to address remaining deficits with progression of activities as per protocol guidelines.      Progress per Plan of Care and Progress strengthening /stabilization /functional activity            Timed:         Manual Therapy:    25     mins  13141;     Therapeutic Exercise:    20     mins  21573;     Neuromuscular Candice:    0    mins  58850;    Therapeutic Activity:     0     mins  41287;     Gait Trainin     mins  72358;     Ultrasound:     0     mins  01905;    Ionto                               0    mins   80831  Self Care                       0     mins   78605  Canalith Repos               0    mins  4209  Aquatic                          0     mins 92102    Un-Timed:  Electrical Stimulation:    0     mins  56116 ( );  Dry Needling     0     mins self-pay  Traction     0     mins 05661  Low Eval     0     Mins  93380  Mod Eval     0     Mins  30377  High Eval                       0     Mins  74633  Re-Eval                           0    mins  68230    Timed Treatment:   45   mins   Total Treatment:     45   mins    Oliva Rosario, PT PT, DPT, [id number]

## 2022-03-15 ENCOUNTER — TREATMENT (OUTPATIENT)
Dept: PHYSICAL THERAPY | Facility: CLINIC | Age: 67
End: 2022-03-15

## 2022-03-15 DIAGNOSIS — Z98.890 S/P RIGHT ROTATOR CUFF REPAIR: Primary | ICD-10-CM

## 2022-03-15 PROCEDURE — 97140 MANUAL THERAPY 1/> REGIONS: CPT

## 2022-03-15 PROCEDURE — 97110 THERAPEUTIC EXERCISES: CPT

## 2022-03-15 NOTE — PROGRESS NOTES
Physical Therapy Daily Progress Note     Diagnosis Plan   1. S/P right rotator cuff repair         VISIT#: 14    Subjective   Jannet Fierro reports:       Objective   AROM:  Right shoulder flexion: 101 degrees  Right shoulder abduction: 65 degrees  See Exercise, Manual, and Modality Logs for complete treatment.     Patient Education:  Access Code: [email or web address]  URL: [email or web address]  Date: 03/15/2022  Prepared by: Oliva Rosario    Exercises  Isometric Shoulder Flexion at Wall - 1 x daily - 5 x weekly - 2 sets - 10 reps - 3 hold  Standing Isometric Shoulder Internal Rotation at Doorway - 1 x daily - 5 x weekly - 2 sets - 10 reps - 3 hold  Isometric Shoulder Abduction at Wall - 1 x daily - 5 x weekly - 2 sets - 10 reps - 3 hold  Isometric Shoulder External Rotation at Wall - 1 x daily - 5 x weekly - 2 sets - 10 reps - 3 hold      Assessment/Plan:  During shoulder ER stretch on wall, pt reported elevated pain and nausea.  Activity stopped.  Patient was allow to rest and symptoms resolved.  Added sidelying ER and shoulder flexion as well as lateral raises this date.  Instructed pt to perform activities in full can position as per protocol guidelines.  Patient continues to be limited in ROM by tissue restriction and pain .      Progress per Plan of Care            Timed:         Manual Therapy:    20     mins  16317;     Therapeutic Exercise:    25     mins  31405;     Neuromuscular Candice:    0    mins  08349;    Therapeutic Activity:     0     mins  07838;     Gait Trainin     mins  43461;     Ultrasound:     0     mins  26880;    Ionto                               0    mins   12386  Self Care                       0     mins   90625  Canalith Repos               0    mins  4209  Aquatic                          0     mins 49414    Un-Timed:  Electrical Stimulation:    0     mins  93060 ( );  Dry Needling     0     mins self-pay  Traction     0     mins 86451  Low Eval     0     Mins  83052  Mod  Eval     0     Mins  16615  High Eval                       0     Mins  94358  Re-Eval                           0    mins  08753    Timed Treatment:   45   mins   Total Treatment:     45   mins    Oliva Rosario, PT PT, DPT, [id number]

## 2022-03-17 ENCOUNTER — TREATMENT (OUTPATIENT)
Dept: PHYSICAL THERAPY | Facility: CLINIC | Age: 67
End: 2022-03-17

## 2022-03-17 DIAGNOSIS — Z98.890 S/P RIGHT ROTATOR CUFF REPAIR: Primary | ICD-10-CM

## 2022-03-17 PROCEDURE — 97140 MANUAL THERAPY 1/> REGIONS: CPT

## 2022-03-17 PROCEDURE — 97110 THERAPEUTIC EXERCISES: CPT

## 2022-03-17 NOTE — PROGRESS NOTES
Physical Therapy Daily Progress Note     Diagnosis Plan   1. S/P right rotator cuff repair         VISIT#: 15    Subjective   Jannet Fierro reports: her shoulder is sore and continues to experience difficulty sleeping.  Patient reported her neck is tight.  Pt expressed frustration to slow progress in ROM    Objective     See Exercise, Manual, and Modality Logs for complete treatment.     Patient Education:  Pt is compliant with current HEP and use of home TENs unit for pain modulation.   Assessment/Plan  Added seated trunk extension AROM this date to address general mobility and flexibility restrictions.  Pt demonstrates significant limitation in (R) ER P/AROM.    Progress per Plan of Care            Timed:         Manual Therapy:    25     mins  08547;     Therapeutic Exercise:    20     mins  59153;     Neuromuscular Candice:    0    mins  92838;    Therapeutic Activity:     0     mins  40863;     Gait Trainin     mins  67500;     Ultrasound:     0     mins  80065;    Ionto                               0    mins   59800  Self Care                       0     mins   52065  Canalith Repos               0    mins  4209  Aquatic                          0     mins 08236    Un-Timed:  Electrical Stimulation:    0     mins  79543 ( );  Dry Needling     0     mins self-pay  Traction     0     mins 83535  Low Eval     0     Mins  64510  Mod Eval     0     Mins  58909  High Eval                       0     Mins  08423  Re-Eval                           0    mins  52337    Timed Treatment:   45   mins   Total Treatment:     45   mins    Oliva Rosario, PT PT, DPT, [id number]

## 2022-03-22 ENCOUNTER — TREATMENT (OUTPATIENT)
Dept: PHYSICAL THERAPY | Facility: CLINIC | Age: 67
End: 2022-03-22

## 2022-03-22 DIAGNOSIS — Z98.890 S/P RIGHT ROTATOR CUFF REPAIR: Primary | ICD-10-CM

## 2022-03-22 PROCEDURE — 97110 THERAPEUTIC EXERCISES: CPT

## 2022-03-22 PROCEDURE — 97140 MANUAL THERAPY 1/> REGIONS: CPT

## 2022-03-22 NOTE — PROGRESS NOTES
Physical Therapy  Progress Note/Reasessment      Patient: Jannet Fierro   : 1955  Diagnosis/ICD-10 Code:  S/P right rotator cuff repair [Z98.890]  Referring practitioner: Maged Woo, *  Date of Initial Visit: Type: THERAPY  Noted: 2022  Today's Date: 3/22/2022  Patient seen for 16 sessions      Subjective:   Visit Diagnosis:    ICD-10-CM ICD-9-CM   1. S/P right rotator cuff repair  Z98.890 V45.89       Jannet Fierro reports: current pain level as 2-3/10 at (R) shoulder.  Patient report she would like to make today final treatment session and would like updated HEP.  Patient reported she will perform HEP for several weeks and then follow up with therapist via email or phone call .  Subjective Questionnaire: QuickDASH: QuickDASH: raw score 40; indicates 65.9% impairment  Clinical Progress: improved  Home Program Compliance: Yes  Treatment has included: therapeutic exercise; manual therapy; e-stim; pt has home TENs unit.    Subjective   Patient reported she is gradually getting better.  Patient stated she does not believe she will have full ROM as she has not had for a long time.  Patient requested to receive updated HEP this date and D/C with HEP.   Objective   Pain  Current pain rating: 3  At best pain ratin  At worst pain ratin  Location: Right upper and anterior shoulder  Objective   Right Shoulder   Flexion:107 degrees (seated); AAROM : 125 deg with assist   Abduction: 80 degrees   External rotation 45°: 37degrees   STRENGTH:   Right shoulder:  Flexion :3+/5  Abduction: 3-/5  ER: 2+/5  IR : 4/5   Assessment/Plan   Patient demonstrates impaired AROM/PROM (R) shoulder flex/abd/ER.  Patient also demonstrates impaired functional strength and strength upon MMT.  Patient c/o pain with resistance.  Activities have been prorgessed as per surgeon's protocol guidelines.  Patient was issued updated HEP handout with progression as per protocol  Guidelines.  D/C as per pt request this date.    Progress toward previous goals: Partially Met    Goals  Goals  Plan Goals: SHORT TERM GOALS: Time for goal achievement: 4 weeks  1. Patient to be compliant with their initial HEP.: MET  2. Patient Independent with AAROM shoulder ex. Pulley or cane.:  MET  3. Patient has improved PROM  Shoulder flex = 125; abd = 90; ER/IR = 75 deg for progression to next phase; PROGRESSING, with exception of abd.      LONG TERM GOALS: Time for goal achievement: 2 months  1. Pt score < 20 % perceived disability on DASH.:NO CHANGE SINCE LAST PROGRESS NOTE ON   2. Pt has functional AROM shoulder flexion/abduction without pain.: PROGRESSING, pt continues to c/o pain with end of available range AROM  3. Pt has 5/5 right UE strength for chores around the house or work.: PROGRESSING   4. Pt reports she is ready for DC to [facility] for self management of right shoulder condition.; MET  Short-term goals (STG): 2/3  Long-term goals (LTG):        Recommendations: Discharge  Timeframe: D/C  Prognosis to achieve goals: fair    Timed:         Manual Therapy:    15     mins  27814;     Therapeutic Exercise:    30     mins  61312;     Neuromuscular Candice:    0    mins  91523;    Therapeutic Activity:     0     mins  75319;     Gait Trainin     mins  79304;     Ultrasound:     0     mins  71646;    Ionto                               0    mins   96748  Self Care                       0     mins   00971  Aquatic                          0     mins 22209      Un-Timed:  Electrical Stimulation:    0     mins  63681 ( );  Dry Needling     0     mins self-pay  Traction     0     mins 87544  Low Eval     0     Mins  48882  Mod Eval     0     Mins  96480  High Eval                       0     Mins  76953  Re-Eval                           0    mins  22554      Timed Treatment:   45   mins   Total Treatment:     45   mins      PT Signature: Oliva Rosario, PT  [place] License #: [id number]

## 2022-04-21 ENCOUNTER — OFFICE VISIT (OUTPATIENT)
Dept: ORTHOPEDIC SURGERY | Facility: CLINIC | Age: 67
End: 2022-04-21

## 2022-04-21 VITALS
HEART RATE: 80 BPM | BODY MASS INDEX: 38.76 KG/M2 | SYSTOLIC BLOOD PRESSURE: 119 MMHG | HEIGHT: 64 IN | DIASTOLIC BLOOD PRESSURE: 73 MMHG | WEIGHT: 227 LBS

## 2022-04-21 DIAGNOSIS — M75.111 PARTIAL NONTRAUMATIC TEAR OF RIGHT ROTATOR CUFF: Primary | ICD-10-CM

## 2022-04-21 PROCEDURE — 99212 OFFICE O/P EST SF 10 MIN: CPT | Performed by: ORTHOPAEDIC SURGERY

## 2022-05-23 ENCOUNTER — OFFICE VISIT (OUTPATIENT)
Dept: FAMILY MEDICINE CLINIC | Facility: CLINIC | Age: 67
End: 2022-05-23

## 2022-05-23 ENCOUNTER — TELEPHONE (OUTPATIENT)
Dept: FAMILY MEDICINE CLINIC | Facility: CLINIC | Age: 67
End: 2022-05-23

## 2022-05-23 VITALS
HEART RATE: 87 BPM | WEIGHT: 226.8 LBS | OXYGEN SATURATION: 97 % | SYSTOLIC BLOOD PRESSURE: 124 MMHG | DIASTOLIC BLOOD PRESSURE: 73 MMHG | BODY MASS INDEX: 38.72 KG/M2 | RESPIRATION RATE: 16 BRPM | HEIGHT: 64 IN | TEMPERATURE: 96.9 F

## 2022-05-23 DIAGNOSIS — M25.571 RIGHT ANKLE PAIN, UNSPECIFIED CHRONICITY: Primary | ICD-10-CM

## 2022-05-23 PROCEDURE — 99213 OFFICE O/P EST LOW 20 MIN: CPT | Performed by: FAMILY MEDICINE

## 2022-05-23 NOTE — PROGRESS NOTES
Subjective   Jannet Fierro is a 66 y.o. female.       66-year-old female patient present with complaint of right ankle pain.  Symptoms noted for 2 to 3 weeks ago.  The patient had a history of a right ankle injury in 2016 and she is  s/p surgery due to fracture.  The quality of the pain is described as aching and shooting. The pain is at a severity of 8/10. The pain is moderate. Pertinent negatives include no loss of sensation or muscle weakness. She has tried acetaminophen/ibuprofen for the symptoms. The treatment provided mild relief.          The following portions of the patient's history were reviewed and updated as appropriate: past medical history, past social history, past surgical history and problem list.    Review of Systems   Musculoskeletal:        Right ankle pain   Skin: Negative for color change, skin lesions and wound.       Objective   Physical Exam  Vitals reviewed.   Constitutional:       General: She is not in acute distress.  Musculoskeletal:      Right ankle: No swelling or ecchymosis. Tenderness present. Decreased range of motion.   Neurological:      Mental Status: She is alert and oriented to person, place, and time.       Vitals:    05/23/22 1438   BP: 124/73   Pulse: 87   Resp: 16   Temp: 96.9 °F (36.1 °C)   SpO2: 97%     Current Outpatient Medications on File Prior to Visit   Medication Sig Dispense Refill   • multivitamin with minerals tablet tablet Take 1 tablet by mouth Daily.       No current facility-administered medications on file prior to visit.         Assessment & Plan   Problems Addressed this Visit        Musculoskeletal and Injuries    Right ankle pain - Primary     Advise conservative management take Tylenol or ibuprofen as needed and activity as tolerated.  Refer to orthopedic surgeon for further management.           Relevant Orders    Ambulatory Referral to Orthopedic Surgery      Diagnoses       Codes Comments    Right ankle pain, unspecified chronicity    -  Primary  ICD-10-CM: M25.571  ICD-9-CM: 719.47                  Answers for HPI/ROS submitted by the patient on 5/23/2022  Please describe your symptoms.: 2016 rt trimalleolar fx and surgery.  The medial screw is painful and sometimes ankle is not able to take my weight without severe pain.  Have you had these symptoms before?: Yes  How long have you been having these symptoms?: Greater than 2 weeks  Please describe any probable cause for these symptoms. : Unknown.  Did screw break?  What is the primary reason for your visit?: Other

## 2022-05-23 NOTE — ASSESSMENT & PLAN NOTE
Advise conservative management take Tylenol or ibuprofen as needed and activity as tolerated.  Refer to orthopedic surgeon for further management.

## 2022-05-26 ENCOUNTER — OFFICE VISIT (OUTPATIENT)
Dept: PODIATRY | Facility: CLINIC | Age: 67
End: 2022-05-26

## 2022-05-26 DIAGNOSIS — M19.171 POST-TRAUMATIC ARTHRITIS OF ANKLE, RIGHT: ICD-10-CM

## 2022-05-26 DIAGNOSIS — M25.571 CHRONIC PAIN OF RIGHT ANKLE: Primary | ICD-10-CM

## 2022-05-26 DIAGNOSIS — G89.29 CHRONIC PAIN OF RIGHT ANKLE: Primary | ICD-10-CM

## 2022-05-26 PROCEDURE — 99203 OFFICE O/P NEW LOW 30 MIN: CPT | Performed by: PODIATRIST

## 2022-05-26 NOTE — PROGRESS NOTES
05/26/2022  Foot and Ankle Surgery - New Patient   Provider: Dr. Jeremy Lewis DPM  Location: [facility]    Subjective:  Jannet Fierro is a 66 y.o. female.     Chief Complaint   Patient presents with   • Right Ankle - Pain     Previous ankle surgery 12/2016  Last pcp dr richard 5/23/2022       HPI: The patient presents due to right ankle pain. She states that she tripped and fell and had the ankle repaired at the end of 2016. She reports that she has always had right malleolus tenderness and reports that the screw head is there. The patient reports that she is here because she saw her primary care physician, Dr. Richard on 05/23/2022 due to occasionally being unable to put any weight on the ankle if she gets up during the night for the past 2 to 3 weeks. She notes that it does not happen every night. She is not experiencing much pain today. The patient is concerned if there are issues related to the screw that was put in place or if it may be broken. She reports that she also has an L1 compression fracture and uses a sock aid at home. She states that she can't bend to get things with both hands, but can bend using one hand.     Allergies   Allergen Reactions   • Methocarbamol Itching       Past Medical History:   Diagnosis Date   • Allergic 03/03/2016    Environmental, air allergies   • Back pain    • Cervical radiculitis 02/11/2020   • Chronic kidney disease     stones   • Chronic midline low back pain without sciatica 11/29/2017   • Compression fracture of first lumbar vertebra (HCC)     2015 history   • Hard to intubate     be careful pt states she is to tell she has a small mouth   • HL (hearing loss) 8-2019    Hearing aids   • Hypertension 2016    Took low dose aspirin   • Kidney stone 2007    Lithotripsy   • Mild single current episode of major depressive disorder (HCC) 11/29/2017   • Neck pain    • Pneumonia     several times   • Sleep apnea     cpap       Past Surgical History:   Procedure  "Laterality Date   • ANKLE SURGERY Right     hardware   • ANTERIOR CERVICAL DISCECTOMY W/ FUSION N/A 3/23/2020    Procedure: Cervical 4 to cervical 5 and cervical 5 to cervical 6 anterior cervical discectomy, fusion and instrumentation;  Surgeon: Segundo Thomas MD;  Location: [facility];  Service: Neurosurgery;  Laterality: N/A;   • CYSTOSCOPY, URETEROSCOPY, RETROGRADE PYELOGRAM, STENT INSERTION Left 9/15/2020    Procedure: CYSTOSCOPY URETEROSCOPY STENT INSERTION;  Surgeon: Hill Johnson MD;  Location: [facility];  Service: Urology;  Laterality: Left;   • KIDNEY STONE SURGERY     • KNEE SURGERY Right     hardware    torn acl & tendons and ligaments with pin and \"bungee cord\"   • SHOULDER ARTHROSCOPY W/ ROTATOR CUFF REPAIR Right 1/5/2022    Procedure: SHOULDER ARTHROSCOPY WITH ROTATOR CUFF REPAIR;  Surgeon: Maged Woo MD;  Location: [facility];  Service: Orthopedics;  Laterality: Right;       Family History   Problem Relation Age of Onset   • Heart attack Mother 71   • Hypothyroidism Mother    • Hyperlipidemia Mother    • Hypertension Father    • Skin cancer Father 91   • Thyroid disease Daughter 31   • Cancer Brother         Glioblastoma, astrocytoma   • Malig Hyperthermia Neg Hx        Social History     Socioeconomic History   • Marital status:    Tobacco Use   • Smoking status: Never Smoker   • Smokeless tobacco: Never Used   Vaping Use   • Vaping Use: Never used   Substance and Sexual Activity   • Alcohol use: Yes     Comment: less than one drink once per month   • Drug use: Never   • Sexual activity: Not Currently     Partners: Male        Current Outpatient Medications on File Prior to Visit   Medication Sig Dispense Refill   • multivitamin with minerals tablet tablet Take 1 tablet by mouth Daily.       No current facility-administered medications on file prior to visit.       Review of Systems:  General: Denies fever, chills, fatigue, and weakness.  Eyes: Denies vision " loss, blurry vision, and excessive redness.  ENT: Denies hearing issues and difficulty swallowing.  Cardiovascular: Denies palpitations, chest pain, or syncopal episodes.  Respiratory: Denies shortness of breath, wheezing, and coughing.  GI: Denies abdominal pain, nausea, and vomiting.   : Denies frequency, hematuria, and urgency.  Musculoskeletal: + Right ankle pain  Derm: Denies rash, open wounds, or suspicious lesions.  Neuro: Denies headaches, numbness, loss of coordination, and tremors.  Psych: Denies anxiety and depression.  Endocrine: Denies temperature intolerance and changes in appetite.  Heme: Denies bleeding disorders or abnormal bruising.     Objective   LMP  (LMP Unknown)     Foot/Ankle Exam:       General:   Appearance: appears stated age and healthy    Orientation: AAOx3    Affect: appropriate      VASCULAR      Right Foot Vascularity   Normal vascular exam    Dorsalis pedis:  2+  Posterior tibial:  2+  Skin Temperature: warm    Edema Grading:  None  CFT:  < 3 seconds  Pedal Hair Growth:  Present  Varicosities: none       Left Foot Vascularity   Normal vascular exam    Dorsalis pedis:  2+  Posterior tibial:  2+  Skin Temperature: warm    Edema Grading:  None  CFT:  < 3 seconds  Pedal Hair Growth:  Present  Varicosities: none        NEUROLOGIC     Right Foot Neurologic   Light touch sensation:  Normal  Hot/Cold sensation: normal    Achilles reflex:  2+     Left Foot Neurologic   Light touch sensation:  Normal  Hot/cold sensation: normal    Achilles reflex:  2+     MUSCULOSKELETAL      Right Foot Musculoskeletal   Ecchymosis:  None  Arch:  Normal     Left Foot Musculoskeletal   Ecchymosis:  None  Arch:  Normal     MUSCLE STRENGTH     Right Foot Muscle Strength   Normal strength    Foot dorsiflexion:  5  Foot plantar flexion:  5  Foot inversion:  5  Foot eversion:  5     Left Foot Muscle Strength   Normal strength    Foot dorsiflexion:  5  Foot plantar flexion:  5  Foot inversion:  5  Foot eversion:   5     DERMATOLOGIC     Right Foot Dermatologic   Skin: skin intact    Nails comment:  Nails 1-5     Left Foot Dermatologic   Skin: skin intact    Nails comment:  Nails 1-5     TESTS     Right Foot Tests   Anterior drawer: negative    Varus tilt: negative       Left Foot Tests   Anterior drawer: negative    Varus tilt: negative        Right Foot Additional Comments Right ankle: Range of motion is supple and crepitus free.   Mild swelling to the perimalar region.   Discomfort with palpation to the anterolateral aspect of the ankle.   No proximal fibular pain.   No progressive deformity or instability.      Assessment & Plan   Diagnoses and all orders for this visit:    1. Chronic pain of right ankle (Primary)  -     XR Ankle 3+ View Right    2. Post-traumatic arthritis of ankle, right      Patient presents for chronic pain involving her right ankle.  She explains that the symptoms are relatively mild.  Imaging was reviewed and discussed with patient.  Findings are consistent with mild degenerative changes likely secondary to her previous ankle fracture. I reassured that I did not see any issues related to her hardware in her ankle. I recommend proper supportive shoes and inserts. Given that she has back issues, I do recommend low impact stretching exercises to work on range of motion and to keep her mobile. If she continues to have more frequency in inflammation, we can consider a steroid injection. She will continue RICE and we did discuss proper use of OTC anti-inflammatories if tolerated. Follow up with me in 6 weeks for reevaluation.  Greater than 30 minutes was spent before, during, and after evaluation for patient care.    Orders Placed This Encounter   Procedures   • XR Ankle 3+ View Right     Order Specific Question:   Reason for Exam:     Answer:   right ankle pain previous surgery 2016 rm 10 WB     Order Specific Question:   Does this patient have a diabetic monitoring/medication delivering device on?      Answer:   No     Order Specific Question:   Release to patient     Answer:   Immediate        Note is dictated utilizing voice recognition software. Unfortunately this leads to occasional typographical errors. I apologize in advance if the situation occurs. If questions occur please do not hesitate to call our office.    Transcribed from ambient dictation for CHRISTOPHER Lewis DPM by MIO HUNG.  05/26/22   17:00 EDT    Patient verbalized consent to the visit recording.

## 2022-05-27 ENCOUNTER — PATIENT ROUNDING (BHMG ONLY) (OUTPATIENT)
Dept: PODIATRY | Facility: CLINIC | Age: 67
End: 2022-05-27

## 2022-05-27 NOTE — PROGRESS NOTES
A My-Chart message has been sent to the patient for PATIENT ROUNDING with [facility]

## 2024-01-06 ENCOUNTER — APPOINTMENT (OUTPATIENT)
Dept: CT IMAGING | Facility: HOSPITAL | Age: 69
End: 2024-01-06
Payer: MEDICARE

## 2024-01-06 ENCOUNTER — HOSPITAL ENCOUNTER (EMERGENCY)
Facility: HOSPITAL | Age: 69
Discharge: HOME | End: 2024-01-06
Attending: EMERGENCY MEDICINE
Payer: MEDICARE

## 2024-01-06 VITALS
SYSTOLIC BLOOD PRESSURE: 120 MMHG | WEIGHT: 200 LBS | BODY MASS INDEX: 34.15 KG/M2 | HEIGHT: 64 IN | DIASTOLIC BLOOD PRESSURE: 68 MMHG | OXYGEN SATURATION: 95 % | RESPIRATION RATE: 18 BRPM | TEMPERATURE: 97.5 F | HEART RATE: 82 BPM

## 2024-01-06 DIAGNOSIS — N23 URETERAL COLIC: Primary | ICD-10-CM

## 2024-01-06 DIAGNOSIS — N20.1 URETERAL CALCULUS: ICD-10-CM

## 2024-01-06 LAB
BACTERIA UR QL AUTO: ABNORMAL /HPF
BILIRUB UR QL STRIP: NEGATIVE
CLARITY UR: CLEAR
COLOR UR: YELLOW
GLUCOSE UR STRIP-MCNC: NEGATIVE MG/DL
HGB UR QL STRIP.AUTO: ABNORMAL
HYALINE CASTS UR QL AUTO: ABNORMAL /LPF
KETONES UR QL STRIP: ABNORMAL
LEUKOCYTE ESTERASE UR QL STRIP.AUTO: ABNORMAL
NITRITE UR QL STRIP: NEGATIVE
PH UR STRIP.AUTO: 6.5 [PH] (ref 5–8)
PROT UR QL STRIP: ABNORMAL
RBC # UR STRIP: ABNORMAL /HPF
REF LAB TEST METHOD: ABNORMAL
RENAL EPI CELLS #/AREA URNS HPF: ABNORMAL /HPF
SP GR UR STRIP: 1.02 (ref 1–1.03)
SQUAMOUS #/AREA URNS HPF: ABNORMAL /HPF
TRANS CELLS #/AREA URNS HPF: ABNORMAL /HPF
UROBILINOGEN UR QL STRIP: ABNORMAL
WBC # UR STRIP: ABNORMAL /HPF

## 2024-01-06 PROCEDURE — 25010000002 MORPHINE PER 10 MG: Performed by: EMERGENCY MEDICINE

## 2024-01-06 PROCEDURE — 96374 THER/PROPH/DIAG INJ IV PUSH: CPT

## 2024-01-06 PROCEDURE — 74176 CT ABD & PELVIS W/O CONTRAST: CPT

## 2024-01-06 PROCEDURE — 96375 TX/PRO/DX INJ NEW DRUG ADDON: CPT

## 2024-01-06 PROCEDURE — 25010000002 ONDANSETRON PER 1 MG: Performed by: EMERGENCY MEDICINE

## 2024-01-06 PROCEDURE — 99284 EMERGENCY DEPT VISIT MOD MDM: CPT

## 2024-01-06 PROCEDURE — 81001 URINALYSIS AUTO W/SCOPE: CPT | Performed by: EMERGENCY MEDICINE

## 2024-01-06 PROCEDURE — 25810000003 SODIUM CHLORIDE 0.9 % SOLUTION: Performed by: EMERGENCY MEDICINE

## 2024-01-06 RX ORDER — MORPHINE SULFATE 2 MG/ML
2 INJECTION, SOLUTION INTRAMUSCULAR; INTRAVENOUS ONCE
Status: COMPLETED | OUTPATIENT
Start: 2024-01-06 | End: 2024-01-06

## 2024-01-06 RX ORDER — ONDANSETRON 2 MG/ML
4 INJECTION INTRAMUSCULAR; INTRAVENOUS ONCE
Status: COMPLETED | OUTPATIENT
Start: 2024-01-06 | End: 2024-01-06

## 2024-01-06 RX ORDER — TRAMADOL HYDROCHLORIDE 50 MG/1
50 TABLET ORAL EVERY 6 HOURS PRN
Qty: 12 TABLET | Refills: 0 | Status: SHIPPED | OUTPATIENT
Start: 2024-01-06

## 2024-01-06 RX ORDER — ONDANSETRON 4 MG/1
4 TABLET, ORALLY DISINTEGRATING ORAL EVERY 8 HOURS PRN
Qty: 12 TABLET | Refills: 0 | Status: SHIPPED | OUTPATIENT
Start: 2024-01-06

## 2024-01-06 RX ORDER — SODIUM CHLORIDE 0.9 % (FLUSH) 0.9 %
10 SYRINGE (ML) INJECTION AS NEEDED
Status: DISCONTINUED | OUTPATIENT
Start: 2024-01-06 | End: 2024-01-06 | Stop reason: HOSPADM

## 2024-01-06 RX ADMIN — SODIUM CHLORIDE 500 ML: 9 INJECTION, SOLUTION INTRAVENOUS at 14:18

## 2024-01-06 RX ADMIN — ONDANSETRON 4 MG: 2 INJECTION INTRAMUSCULAR; INTRAVENOUS at 14:19

## 2024-01-06 RX ADMIN — MORPHINE SULFATE 2 MG: 2 INJECTION, SOLUTION INTRAMUSCULAR; INTRAVENOUS at 14:18

## 2024-01-06 NOTE — ED PROVIDER NOTES
"Subjective   History of Present Illness  Patient is a 68-year-old female complaint of left flank pain for the past 12 hours.  The pain is moderate and constant.  She also complains of nausea.  She denies cough fever vomit diarrhea dysuria or other complaint      Review of Systems    Past Medical History:   Diagnosis Date    Allergic 03/03/2016    Environmental, air allergies    Back pain     Cervical radiculitis 02/11/2020    Chronic kidney disease     stones    Chronic midline low back pain without sciatica 11/29/2017    Compression fracture of first lumbar vertebra     2015 history    Hard to intubate     be careful pt states she is to tell she has a small mouth    HL (hearing loss) 8-2019    Hearing aids    Hypertension 2016    Took low dose aspirin    Kidney stone 2007    Lithotripsy    Mild single current episode of major depressive disorder 11/29/2017    Neck pain     Pneumonia     several times    Sleep apnea     cpap       Allergies   Allergen Reactions    Lortab [Hydrocodone-Acetaminophen] Itching    Methocarbamol Itching       Past Surgical History:   Procedure Laterality Date    ANKLE SURGERY Right     hardware    ANTERIOR CERVICAL DISCECTOMY W/ FUSION N/A 3/23/2020    Procedure: Cervical 4 to cervical 5 and cervical 5 to cervical 6 anterior cervical discectomy, fusion and instrumentation;  Surgeon: Segundo Thomas MD;  Location: [facility] OR;  Service: Neurosurgery;  Laterality: N/A;    CYSTOSCOPY, URETEROSCOPY, RETROGRADE PYELOGRAM, STENT INSERTION Left 9/15/2020    Procedure: CYSTOSCOPY URETEROSCOPY STENT INSERTION;  Surgeon: Hill Johnson MD;  Location: [facility] OR;  Service: Urology;  Laterality: Left;    KIDNEY STONE SURGERY      KNEE SURGERY Right     hardware    torn acl & tendons and ligaments with pin and \"bungee cord\"    SHOULDER ARTHROSCOPY W/ ROTATOR CUFF REPAIR Right 1/5/2022    Procedure: SHOULDER ARTHROSCOPY WITH ROTATOR CUFF REPAIR;  Surgeon: Maged Woo MD;  " Location: [facility] MA[facility] OR;  Service: Orthopedics;  Laterality: Right;       Family History   Problem Relation Age of Onset    Heart attack Mother 71    Hypothyroidism Mother     Hyperlipidemia Mother     Hypertension Father     Skin cancer Father 91    Thyroid disease Daughter 31    Cancer Brother         Glioblastoma, astrocytoma    Malig Hyperthermia Neg Hx        Social History     Socioeconomic History    Marital status:    Tobacco Use    Smoking status: Never    Smokeless tobacco: Never   Vaping Use    Vaping Use: Never used   Substance and Sexual Activity    Alcohol use: Yes     Comment: less than one drink once per month    Drug use: Never    Sexual activity: Not Currently     Partners: Male           Objective   Physical Exam  Lungs are clear.  Heart has rate rhythm without murmur.  Chest nontender.  Ab soft with mild left flank tenderness.  Patient has normal bowel sounds.  Mental exam unremarkable.  Procedures           ED Course      Results for orders placed or performed during the hospital encounter of 01/06/24   Urinalysis With Microscopic If Indicated (No Culture) - Urine, Clean Catch    Specimen: Urine, Clean Catch   Result Value Ref Range    Color, UA Yellow Yellow, Straw    Appearance, UA Clear Clear    pH, UA 6.5 5.0 - 8.0    Specific Gravity, UA 1.022 1.005 - 1.030    Glucose, UA Negative Negative    Ketones, UA 15 mg/dL (1+) (A) Negative    Bilirubin, UA Negative Negative    Blood, UA Moderate (2+) (A) Negative    Protein, UA Trace (A) Negative    Leuk Esterase, UA Moderate (2+) (A) Negative    Nitrite, UA Negative Negative    Urobilinogen, UA 1.0 E.U./dL 0.2 - 1.0 E.U./dL   Urinalysis, Microscopic Only - Urine, Clean Catch    Specimen: Urine, Clean Catch   Result Value Ref Range    RBC, UA 11-20 (A) None Seen, 0-2 /HPF    WBC, UA 11-20 (A) None Seen, 0-2 /HPF    Bacteria, UA None Seen None Seen /HPF    Squamous Epithelial Cells, UA 7-12 (A) None Seen, 0-2 /HPF    Transitional Epithelial  Cells, UA 3-6 (A) 0 - 2 /HPF    Renal Epithelial Cells, UA 3-6 (A) 0 - 2 /HPF    Hyaline Casts, UA 0-2 None Seen /LPF    Methodology Manual Light Microscopy      CT Abdomen Pelvis Without Contrast    Result Date: 1/6/2024  Impression: 1.7 mm obstructing stone within distal left ureter, with moderate left hydronephrosis. 2.Bilateral nonobstructing renal stones. 3.Cholelithiasis. Electronically Signed: Rafael Monreal MD  1/6/2024 4:28 PM EST  Workstation ID: [id number]                                          Medical Decision Making  Interpretation patient CT scan abdomen pelvis without contrast shows a 6 mm stone left distal ureter with obstructive change.  UA shows no evidence of UTI.  Patient was given IV morphine and Zofran with improvement.  She will be discharged with a prescription for hydrocodone and Zofran.  She will see MD for recheck as needed.    Amount and/or Complexity of Data Reviewed  Labs: ordered. Decision-making details documented in ED Course.  Radiology: ordered and independent interpretation performed.    Risk  Prescription drug management.        Final diagnoses:   Ureteral colic   Ureteral calculus       ED Disposition  ED Disposition       ED Disposition   Discharge    Condition   Stable    Comment   --               No follow-up provider specified.       Medication List        New Prescriptions      ondansetron ODT 4 MG disintegrating tablet  Commonly known as: ZOFRAN-ODT  Place 1 tablet on the tongue Every 8 (Eight) Hours As Needed for Vomiting.     traMADol 50 MG tablet  Commonly known as: ULTRAM  Take 1 tablet by mouth Every 6 (Six) Hours As Needed for Severe Pain.               Where to Get Your Medications        These medications were sent to [id number] - [place], [place] - [address] AT [facility] - [phone]  - [phone] FX  [address], [place] [address]      Phone: [phone]   ondansetron ODT 4 MG disintegrating  tablet  traMADol 50 MG tablet            Raymond Betancur MD  01/06/24 [id number]

## 2024-01-23 ENCOUNTER — TRANSCRIBE ORDERS (OUTPATIENT)
Dept: ADMINISTRATIVE | Facility: HOSPITAL | Age: 69
End: 2024-01-23
Payer: MEDICARE

## 2024-01-23 DIAGNOSIS — N20.0 CALCULUS, RENAL: Primary | ICD-10-CM

## 2024-01-31 ENCOUNTER — HOSPITAL ENCOUNTER (OUTPATIENT)
Dept: CT IMAGING | Facility: HOSPITAL | Age: 69
Discharge: HOME | End: 2024-01-31
Admitting: UROLOGY
Payer: MEDICARE

## 2024-01-31 DIAGNOSIS — N20.0 CALCULUS, RENAL: ICD-10-CM

## 2024-01-31 PROCEDURE — 74176 CT ABD & PELVIS W/O CONTRAST: CPT

## 2024-02-13 ENCOUNTER — TRANSCRIBE ORDERS (OUTPATIENT)
Dept: ADMINISTRATIVE | Facility: HOSPITAL | Age: 69
End: 2024-02-13
Payer: MEDICARE

## 2024-02-13 ENCOUNTER — HOSPITAL ENCOUNTER (OUTPATIENT)
Dept: GENERAL RADIOLOGY | Facility: HOSPITAL | Age: 69
Discharge: HOME | End: 2024-02-13
Admitting: UROLOGY
Payer: MEDICARE

## 2024-02-13 DIAGNOSIS — N20.0 CALCULUS, RENAL: Primary | ICD-10-CM

## 2024-02-13 DIAGNOSIS — N20.0 CALCULUS, RENAL: ICD-10-CM

## 2024-02-13 PROCEDURE — 74018 RADEX ABDOMEN 1 VIEW: CPT

## 2024-02-29 ENCOUNTER — TRANSCRIBE ORDERS (OUTPATIENT)
Dept: ADMINISTRATIVE | Facility: HOSPITAL | Age: 69
End: 2024-02-29
Payer: MEDICARE

## 2024-02-29 ENCOUNTER — HOSPITAL ENCOUNTER (OUTPATIENT)
Dept: GENERAL RADIOLOGY | Facility: HOSPITAL | Age: 69
Discharge: HOME | End: 2024-02-29
Admitting: UROLOGY
Payer: MEDICARE

## 2024-02-29 DIAGNOSIS — N20.0 CALCULUS OF KIDNEY: Primary | ICD-10-CM

## 2024-02-29 DIAGNOSIS — N20.0 CALCULUS OF KIDNEY: ICD-10-CM

## 2024-02-29 PROCEDURE — 74018 RADEX ABDOMEN 1 VIEW: CPT

## 2024-03-07 ENCOUNTER — TRANSCRIBE ORDERS (OUTPATIENT)
Dept: ADMINISTRATIVE | Facility: HOSPITAL | Age: 69
End: 2024-03-07
Payer: MEDICARE

## 2024-03-07 ENCOUNTER — HOSPITAL ENCOUNTER (OUTPATIENT)
Dept: CARDIOLOGY | Facility: HOSPITAL | Age: 69
Discharge: HOME | End: 2024-03-07
Admitting: ANESTHESIOLOGY
Payer: MEDICARE

## 2024-03-07 DIAGNOSIS — N20.0 CALCULUS, RENAL: Primary | ICD-10-CM

## 2024-03-07 DIAGNOSIS — N20.0 CALCULUS, RENAL: ICD-10-CM

## 2024-03-07 PROCEDURE — 93005 ELECTROCARDIOGRAM TRACING: CPT | Performed by: ANESTHESIOLOGY

## 2024-03-09 LAB
QT INTERVAL: 350 MS
QTC INTERVAL: 420 MS

## 2024-03-17 ENCOUNTER — HOSPITAL ENCOUNTER (EMERGENCY)
Facility: HOSPITAL | Age: 69
Discharge: HOME | End: 2024-03-17
Admitting: EMERGENCY MEDICINE
Payer: MEDICARE

## 2024-03-17 VITALS
WEIGHT: 215 LBS | RESPIRATION RATE: 18 BRPM | HEIGHT: 64 IN | OXYGEN SATURATION: 95 % | TEMPERATURE: 98.3 F | SYSTOLIC BLOOD PRESSURE: 136 MMHG | DIASTOLIC BLOOD PRESSURE: 69 MMHG | HEART RATE: 96 BPM | BODY MASS INDEX: 36.7 KG/M2

## 2024-03-17 DIAGNOSIS — K59.00 CONSTIPATION, UNSPECIFIED CONSTIPATION TYPE: Primary | ICD-10-CM

## 2024-03-17 DIAGNOSIS — R10.84 GENERALIZED ABDOMINAL PAIN: ICD-10-CM

## 2024-03-17 PROCEDURE — 99284 EMERGENCY DEPT VISIT MOD MDM: CPT

## 2024-03-17 NOTE — DISCHARGE INSTRUCTIONS
Initiate MiraLAX.  May take 1-3 capfuls daily until regular moving.  Follow up With your primary care provider as needed.

## 2024-03-17 NOTE — ED PROVIDER NOTES
Subjective   History of Present Illness  Patient is a 68-year-old female who presents with lower abdominal pressure and pain over the last 4 days has not had a bowel movement since her lithotripsy 4 days ago has not been taking any pain medication but reports she had a previous episode where she needed an enema after lithotripsy but she was on narcotics at that time.  She reports that she has been taking fiber without relief.        Review of Systems   Constitutional:  Negative for activity change, appetite change, chills, diaphoresis, fatigue and fever.   Gastrointestinal:  Positive for abdominal distention and abdominal pain. Negative for nausea and vomiting.   Genitourinary:  Positive for dysuria. Negative for flank pain, frequency and urgency.       Past Medical History:   Diagnosis Date    Allergic 03/03/2016    Environmental, air allergies    Back pain     Cervical radiculitis 02/11/2020    Chronic kidney disease     stones    Chronic midline low back pain without sciatica 11/29/2017    Compression fracture of first lumbar vertebra     2015 history    Hard to intubate     be careful pt states she is to tell she has a small mouth    HL (hearing loss) 8-2019    Hearing aids    Hypertension 2016    Took low dose aspirin    Kidney stone 2007    Lithotripsy    Mild single current episode of major depressive disorder 11/29/2017    Neck pain     Pneumonia     several times    Sleep apnea     cpap       Allergies   Allergen Reactions    Lortab [Hydrocodone-Acetaminophen] Itching    Methocarbamol Itching       Past Surgical History:   Procedure Laterality Date    ANKLE SURGERY Right     hardware    ANTERIOR CERVICAL DISCECTOMY W/ FUSION N/A 3/23/2020    Procedure: Cervical 4 to cervical 5 and cervical 5 to cervical 6 anterior cervical discectomy, fusion and instrumentation;  Surgeon: Segundo Thomas MD;  Location: [facility];  Service: Neurosurgery;  Laterality: N/A;    CYSTOSCOPY, URETEROSCOPY, RETROGRADE  "PYELOGRAM, STENT INSERTION Left 9/15/2020    Procedure: CYSTOSCOPY URETEROSCOPY STENT INSERTION;  Surgeon: Hill Johnson MD;  Location: [facility] MA[facility] OR;  Service: Urology;  Laterality: Left;    KIDNEY STONE SURGERY      KNEE SURGERY Right     hardware    torn acl & tendons and ligaments with pin and \"bungee cord\"    SHOULDER ARTHROSCOPY W/ ROTATOR CUFF REPAIR Right 1/5/2022    Procedure: SHOULDER ARTHROSCOPY WITH ROTATOR CUFF REPAIR;  Surgeon: Maged Woo MD;  Location: [facility] OR;  Service: Orthopedics;  Laterality: Right;       Family History   Problem Relation Age of Onset    Heart attack Mother 71    Hypothyroidism Mother     Hyperlipidemia Mother     Hypertension Father     Skin cancer Father 91    Thyroid disease Daughter 31    Cancer Brother         Glioblastoma, astrocytoma    Malig Hyperthermia Neg Hx        Social History     Socioeconomic History    Marital status:    Tobacco Use    Smoking status: Never    Smokeless tobacco: Never   Vaping Use    Vaping status: Never Used   Substance and Sexual Activity    Alcohol use: Yes     Comment: less than one drink once per month    Drug use: Never    Sexual activity: Not Currently     Partners: Male           Objective   Physical Exam  Vitals and nursing note reviewed.   Constitutional:       General: She is not in acute distress.     Appearance: Normal appearance. She is well-developed. She is not ill-appearing, toxic-appearing or diaphoretic.   HENT:      Head: Normocephalic and atraumatic.      Nose: Nose normal.   Eyes:      Pupils: Pupils are equal, round, and reactive to light.   Pulmonary:      Effort: Pulmonary effort is normal.   Abdominal:      General: Bowel sounds are normal. There is no distension.      Palpations: Abdomen is soft.      Tenderness: There is abdominal tenderness.   Musculoskeletal:         General: Normal range of motion.      Cervical back: Normal range of motion and neck supple.   Skin:     General: Skin " "is warm and dry.   Neurological:      General: No focal deficit present.      Mental Status: She is alert and oriented to person, place, and time.   Psychiatric:         Mood and Affect: Mood normal.         Behavior: Behavior normal.         Thought Content: Thought content normal.         Judgment: Judgment normal.         Procedures           ED Course                                             Medical Decision Making  Appropriate PPE worn during exam.    /69   Pulse 96   Temp 98.3 °F (36.8 °C) (Oral)   Resp 18   Ht 162.6 cm (64\")   Wt 97.5 kg (215 lb)   LMP  (LMP Unknown)   SpO2 95%   BMI 36.90 kg/m²      Co-morbidities --  has a past medical history of Allergic (03/03/2016), Back pain, Cervical radiculitis (02/11/2020), Chronic kidney disease, Chronic midline low back pain without sciatica (11/29/2017), Compression fracture of first lumbar vertebra, Hard to intubate, HL (hearing loss) (8-2019), Hypertension (2016), Kidney stone (2007), Mild single current episode of major depressive disorder (11/29/2017), Neck pain, Pneumonia, and Sleep apnea.  Radiology interpretation --  X-rays reviewed by me and interpreted by radiologist:  No radiology results for the last day    Patient was given an enema with a large bowel movement and resolution of pain.  Due to resolution of symptoms no other further testing was ordered.  She was stable at time of discharge return precautions were provided I did advise the patient to initiate MiraLAX 1-3 capfuls she was in agreement plan.  I discussed the findings and recommendations with the patient who voices understanding. Stable while in the ER.     Note Disclaimer: At [facility], we believe that sharing information builds trust and better relationships. You are receiving this note because you are receiving care at [facility] or recently visited. It is possible you will see health information before a provider has talked with you about it. This kind of " information can be easy to misunderstand. To help you fully understand what it means for your health, we urge you to discuss this note with your provider.        Problems Addressed:  Constipation, unspecified constipation type: acute illness or injury  Generalized abdominal pain: acute illness or injury        Final diagnoses:   Constipation, unspecified constipation type   Generalized abdominal pain       ED Disposition  ED Disposition       ED Disposition   Discharge    Condition   Stable    Comment   --               Latoya Richard MD  [address] [address]  [phone]    Call   As needed, If symptoms worsen         Medication List      No changes were made to your prescriptions during this visit.            Yue Medina PA-C  03/17/24 4370

## 2024-04-01 ENCOUNTER — HOSPITAL ENCOUNTER (OUTPATIENT)
Dept: GENERAL RADIOLOGY | Facility: HOSPITAL | Age: 69
Discharge: HOME | End: 2024-04-01
Admitting: UROLOGY
Payer: MEDICARE

## 2024-04-01 ENCOUNTER — TRANSCRIBE ORDERS (OUTPATIENT)
Dept: ADMINISTRATIVE | Facility: HOSPITAL | Age: 69
End: 2024-04-01
Payer: MEDICARE

## 2024-04-01 DIAGNOSIS — N20.0 KIDNEY STONES: ICD-10-CM

## 2024-04-01 DIAGNOSIS — N20.0 KIDNEY STONES: Primary | ICD-10-CM

## 2024-04-01 PROCEDURE — 74018 RADEX ABDOMEN 1 VIEW: CPT

## 2024-04-22 ENCOUNTER — TRANSCRIBE ORDERS (OUTPATIENT)
Dept: ADMINISTRATIVE | Facility: HOSPITAL | Age: 69
End: 2024-04-22
Payer: MEDICARE

## 2024-04-22 ENCOUNTER — HOSPITAL ENCOUNTER (OUTPATIENT)
Dept: GENERAL RADIOLOGY | Facility: HOSPITAL | Age: 69
Discharge: HOME | End: 2024-04-22
Admitting: UROLOGY
Payer: MEDICARE

## 2024-04-22 DIAGNOSIS — N20.0 KIDNEY STONES: ICD-10-CM

## 2024-04-22 DIAGNOSIS — N20.0 KIDNEY STONES: Primary | ICD-10-CM

## 2024-04-22 PROCEDURE — 74018 RADEX ABDOMEN 1 VIEW: CPT

## (undated) DEVICE — GLV SURG SIGNATURE ESSENTIAL PF LTX SZ8.5

## (undated) DEVICE — NEEDLE, QUINCKE, 18GX3.5": Brand: MEDLINE

## (undated) DEVICE — GLV SURG SIGNATURE ESSENTIAL PF LTX SZ7

## (undated) DEVICE — SYS IRR PUMP SGL ACTN VAC SYR 10CC

## (undated) DEVICE — PK SHLDR ARTHROSCOPY 50

## (undated) DEVICE — Device

## (undated) DEVICE — SHEET, DRAPE, SPLIT, STERILE: Brand: MEDLINE

## (undated) DEVICE — SLV SCD CALF HEMOFORCE DVT THERP REPROC MD

## (undated) DEVICE — SUT SILK 2/0 TIES 18IN A185H

## (undated) DEVICE — SUTURELASSO CRV 25D RT

## (undated) DEVICE — UNDERGLV SURG BIOGEL INDICAT PI SZ8 BLU

## (undated) DEVICE — SUT PDS 1 TP1 48IN Z880G BX/12

## (undated) DEVICE — TBG ARTHSCP PUMP W CONN/REDUC 8FT

## (undated) DEVICE — TBG ARTHSCP PT W CONN/REDUC 8FT

## (undated) DEVICE — CODMAN® SURGICAL PATTIES 3/4" X 3/4" (1.91CM X 1.91CM): Brand: CODMAN®

## (undated) DEVICE — SUT PDS 0 CT-1 Z340H

## (undated) DEVICE — ELECTRD BLD EDGE/INSUL1P 2.4X5.1MM STRL

## (undated) DEVICE — GOWN,SLEEVE,STERILE,W/CSR WRAP,1/P: Brand: MEDLINE

## (undated) DEVICE — 1 ML TUBERCULIN SYRINGE REGULAR TIP: Brand: MONOJECT

## (undated) DEVICE — GLV SURG SIGNATURE ESSENTIAL PF LTX SZ8

## (undated) DEVICE — CANN TRPL DAM 7X7MM

## (undated) DEVICE — SUT SILK 2/0 FS BLK 18IN 685G

## (undated) DEVICE — PAD,ABDOMINAL,5"X9",STERILE,LF,1/PK: Brand: MEDLINE INDUSTRIES, INC.

## (undated) DEVICE — ADHS LIQ MASTISOL 2/3ML

## (undated) DEVICE — ANTIBACTERIAL UNDYED BRAIDED (POLYGLACTIN 910), SYNTHETIC ABSORBABLE SUTURE: Brand: COATED VICRYL

## (undated) DEVICE — 6.0MM PRECISION ROUND

## (undated) DEVICE — GLV SURG BIOGEL LTX PF 7

## (undated) DEVICE — SOL IRRG H2O BG 3000ML STRL

## (undated) DEVICE — CANN PASSPORT BUTN 8MM 4CM

## (undated) DEVICE — PK NEURO SPINE 40

## (undated) DEVICE — CONN TBG Y 5 IN 1 LF STRL

## (undated) DEVICE — WRAP SHOULDER COLD THERAPY

## (undated) DEVICE — TUBING, SUCTION, 1/4" X 12', STRAIGHT: Brand: MEDLINE

## (undated) DEVICE — TBG ARTHSCP DUALWAVE

## (undated) DEVICE — JACKSON-PRATT 100CC BULB RESERVOIR: Brand: CARDINAL HEALTH

## (undated) DEVICE — SPNG GZ AVANT 6PLY 4X4IN STRL PK/2

## (undated) DEVICE — KT SURG TURNOVER 050

## (undated) DEVICE — ABL ASP APOLLO RF XL 90D

## (undated) DEVICE — DRP MICROSCOPE 4 BINOCULAR CV 54X150IN

## (undated) DEVICE — TUBING, SUCTION, 1/4" X 20', STRAIGHT: Brand: MEDLINE INDUSTRIES, INC.

## (undated) DEVICE — 3.0MM NEURO (MATCH HEAD) LESS AGGRESSIVE

## (undated) DEVICE — GLV SURG SENSICARE W/ALOE PF LF 7.5 STRL

## (undated) DEVICE — SMOKE EVACUATION TUBING WITH 7/8 IN TO 1/4 IN REDUCER: Brand: BUFFALO FILTER

## (undated) DEVICE — SUTURELASSO CRV 25D LT

## (undated) DEVICE — DRSNG WND GZ PAD BORDERED 4X8IN STRL

## (undated) DEVICE — GLV SURG SENSICARE PI ORTHO SZ8 LF STRL

## (undated) DEVICE — NITINOL WIRE WITH HYDROPHILIC TIP: Brand: SENSOR

## (undated) DEVICE — DRN WND JP RND W TROC SIL 10F 1/8IN

## (undated) DEVICE — DISPOSABLE IRRIGATION BIPOLAR CORD, M1000 TYPE: Brand: KIRWAN

## (undated) DEVICE — COVER,TABLE,HEAVY DUTY,79"X110",STRL: Brand: MEDLINE

## (undated) DEVICE — PK CYSTO 50